# Patient Record
Sex: FEMALE | Race: BLACK OR AFRICAN AMERICAN | NOT HISPANIC OR LATINO | Employment: FULL TIME | URBAN - METROPOLITAN AREA
[De-identification: names, ages, dates, MRNs, and addresses within clinical notes are randomized per-mention and may not be internally consistent; named-entity substitution may affect disease eponyms.]

---

## 2023-04-21 LAB
CHLAMYDIA TRACH., AMPLIFIED: NEGATIVE
N. GONORRHEA, AMPLIFIED: NEGATIVE
TRICHOMONAS VAGINALIS: NEGATIVE

## 2023-10-14 PROBLEM — F31.9 BIPOLAR AND RELATED DISORDER (MULTI): Status: ACTIVE | Noted: 2023-10-14

## 2023-10-14 PROBLEM — J33.9 NASAL POLYP: Status: ACTIVE | Noted: 2023-10-14

## 2023-10-14 PROBLEM — S01.332A COMPLICATION OF LEFT EAR PIERCING: Status: ACTIVE | Noted: 2023-10-14

## 2023-10-14 PROBLEM — R53.81 MALAISE AND FATIGUE: Status: ACTIVE | Noted: 2023-10-14

## 2023-10-14 PROBLEM — M54.50 LUMBAR PAIN: Status: ACTIVE | Noted: 2023-10-14

## 2023-10-14 PROBLEM — M79.18 MYOFASCIAL PAIN SYNDROME OF LUMBAR SPINE: Status: ACTIVE | Noted: 2023-10-14

## 2023-10-14 PROBLEM — J45.909 ASTHMATIC BRONCHITIS (HHS-HCC): Status: ACTIVE | Noted: 2023-10-14
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
## 2023-10-14 PROBLEM — R53.83 FATIGUE: Status: ACTIVE | Noted: 2023-10-14

## 2023-10-14 PROBLEM — M79.18 CERVICAL MYOFASCIAL PAIN SYNDROME: Status: ACTIVE | Noted: 2023-10-14

## 2023-10-14 PROBLEM — E55.9 VITAMIN D DEFICIENCY: Status: ACTIVE | Noted: 2023-10-14

## 2023-10-14 PROBLEM — F41.9 ANXIETY: Status: ACTIVE | Noted: 2023-10-14

## 2023-10-14 PROBLEM — N92.6 MISSED MENSES: Status: ACTIVE | Noted: 2023-10-14

## 2023-10-14 PROBLEM — R45.89 DEPRESSED AFFECT: Status: ACTIVE | Noted: 2023-10-14

## 2023-10-14 PROBLEM — M54.12 CERVICAL RADICULOPATHY, ACUTE: Status: ACTIVE | Noted: 2023-10-14

## 2023-10-14 PROBLEM — R06.00 DYSPNEA: Status: ACTIVE | Noted: 2023-10-14

## 2023-10-14 PROBLEM — S13.4XXA WHIPLASH INJURY TO NECK: Status: ACTIVE | Noted: 2023-10-14

## 2023-10-14 PROBLEM — R53.83 MALAISE AND FATIGUE: Status: ACTIVE | Noted: 2023-10-14

## 2023-10-14 PROBLEM — R45.89 DEPRESSED MOOD: Status: ACTIVE | Noted: 2023-10-14

## 2023-10-14 PROBLEM — F41.8 DEPRESSION WITH ANXIETY: Status: ACTIVE | Noted: 2023-10-14

## 2023-10-14 PROBLEM — J32.0 CHRONIC MAXILLARY SINUSITIS: Status: ACTIVE | Noted: 2023-10-14

## 2023-10-14 PROBLEM — H52.13 MYOPIA, BILATERAL: Status: ACTIVE | Noted: 2023-10-14

## 2023-10-14 PROBLEM — J45.50: Status: ACTIVE | Noted: 2023-10-14

## 2023-10-14 RX ORDER — METHOCARBAMOL 750 MG/1
1 TABLET, FILM COATED ORAL 3 TIMES DAILY
COMMUNITY
Start: 2021-07-28 | End: 2023-12-06 | Stop reason: WASHOUT

## 2023-10-14 RX ORDER — GABAPENTIN 100 MG/1
CAPSULE ORAL AS NEEDED
COMMUNITY
End: 2023-12-06 | Stop reason: WASHOUT

## 2023-10-14 RX ORDER — MUPIROCIN 20 MG/G
OINTMENT TOPICAL 3 TIMES DAILY
COMMUNITY
Start: 2021-03-30 | End: 2023-12-06 | Stop reason: WASHOUT

## 2023-10-14 RX ORDER — LURASIDONE HYDROCHLORIDE 40 MG/1
1 TABLET, FILM COATED ORAL DAILY
COMMUNITY
Start: 2020-07-03 | End: 2023-10-20 | Stop reason: SDUPTHER

## 2023-10-14 RX ORDER — LAMOTRIGINE 100 MG/1
TABLET ORAL DAILY
COMMUNITY
End: 2023-12-06 | Stop reason: WASHOUT

## 2023-10-14 RX ORDER — ESCITALOPRAM OXALATE 20 MG/1
1 TABLET ORAL DAILY
COMMUNITY
End: 2023-12-06 | Stop reason: WASHOUT

## 2023-10-14 RX ORDER — SERTRALINE HYDROCHLORIDE 25 MG/1
1 TABLET, FILM COATED ORAL DAILY
COMMUNITY
Start: 2021-08-31 | End: 2023-10-20 | Stop reason: WASHOUT

## 2023-10-14 RX ORDER — BENZONATATE 200 MG/1
200 CAPSULE ORAL 3 TIMES DAILY PRN
COMMUNITY
Start: 2022-06-15 | End: 2023-12-06 | Stop reason: WASHOUT

## 2023-10-14 RX ORDER — CYCLOBENZAPRINE HCL 10 MG
1 TABLET ORAL NIGHTLY
COMMUNITY
Start: 2021-08-06 | End: 2023-12-06 | Stop reason: WASHOUT

## 2023-10-14 RX ORDER — PRENATAL VIT 49/IRON FUM/FOLIC 6.75-0.2MG
TABLET ORAL
COMMUNITY
End: 2024-05-01 | Stop reason: SDUPTHER

## 2023-10-14 RX ORDER — SERTRALINE HYDROCHLORIDE 100 MG/1
100 TABLET, FILM COATED ORAL
COMMUNITY
Start: 2022-05-12 | End: 2023-10-20 | Stop reason: WASHOUT

## 2023-10-14 RX ORDER — CLONAZEPAM 0.5 MG/1
0.5 TABLET ORAL DAILY PRN
COMMUNITY
Start: 2023-06-13 | End: 2023-12-06 | Stop reason: WASHOUT

## 2023-10-14 RX ORDER — HYDROXYZINE HYDROCHLORIDE 25 MG/1
25 TABLET, FILM COATED ORAL NIGHTLY
COMMUNITY
Start: 2021-08-31 | End: 2023-12-06 | Stop reason: WASHOUT

## 2023-10-14 RX ORDER — METFORMIN HYDROCHLORIDE 500 MG/1
1 TABLET ORAL EVERY 12 HOURS
COMMUNITY
Start: 2021-02-04 | End: 2023-12-06 | Stop reason: WASHOUT

## 2023-10-14 RX ORDER — NAPROXEN 500 MG/1
1 TABLET ORAL EVERY 12 HOURS PRN
COMMUNITY
Start: 2021-08-06 | End: 2023-10-19

## 2023-10-14 RX ORDER — NORGESTIMATE AND ETHINYL ESTRADIOL 0.25-0.035
1 KIT ORAL DAILY
COMMUNITY
Start: 2023-09-12 | End: 2023-12-06 | Stop reason: WASHOUT

## 2023-10-14 RX ORDER — LURASIDONE HYDROCHLORIDE 80 MG/1
TABLET, FILM COATED ORAL
COMMUNITY
Start: 2022-06-15 | End: 2023-12-06 | Stop reason: WASHOUT

## 2023-10-14 RX ORDER — LITHIUM CARBONATE 300 MG/1
600 TABLET, FILM COATED, EXTENDED RELEASE ORAL
COMMUNITY
Start: 2023-06-13 | End: 2023-12-06 | Stop reason: WASHOUT

## 2023-10-14 RX ORDER — CHOLECALCIFEROL (VITAMIN D3) 1250 MCG
TABLET ORAL
COMMUNITY
End: 2023-12-06 | Stop reason: WASHOUT

## 2023-10-14 RX ORDER — ALBUTEROL SULFATE 90 UG/1
1-2 AEROSOL, METERED RESPIRATORY (INHALATION) AS NEEDED
COMMUNITY
Start: 2022-06-15 | End: 2023-12-06 | Stop reason: WASHOUT

## 2023-10-17 ENCOUNTER — APPOINTMENT (OUTPATIENT)
Dept: OBSTETRICS AND GYNECOLOGY | Facility: CLINIC | Age: 33
End: 2023-10-17
Payer: MEDICAID

## 2023-10-19 ENCOUNTER — LAB (OUTPATIENT)
Dept: LAB | Facility: LAB | Age: 33
End: 2023-10-19
Payer: MEDICAID

## 2023-10-19 ENCOUNTER — INITIAL PRENATAL (OUTPATIENT)
Dept: OBSTETRICS AND GYNECOLOGY | Facility: CLINIC | Age: 33
End: 2023-10-19
Payer: MEDICAID

## 2023-10-19 VITALS — BODY MASS INDEX: 34.44 KG/M2 | SYSTOLIC BLOOD PRESSURE: 110 MMHG | DIASTOLIC BLOOD PRESSURE: 78 MMHG | WEIGHT: 194.4 LBS

## 2023-10-19 DIAGNOSIS — O99.210 OBESITY IN PREGNANCY (HHS-HCC): ICD-10-CM

## 2023-10-19 DIAGNOSIS — O09.91 HIGH-RISK PREGNANCY IN FIRST TRIMESTER (HHS-HCC): ICD-10-CM

## 2023-10-19 DIAGNOSIS — F31.9 BIPOLAR AND RELATED DISORDER (MULTI): ICD-10-CM

## 2023-10-19 DIAGNOSIS — O09.91 HIGH-RISK PREGNANCY IN FIRST TRIMESTER (HHS-HCC): Primary | ICD-10-CM

## 2023-10-19 PROBLEM — F31.10: Chronic | Status: ACTIVE | Noted: 2021-09-10

## 2023-10-19 PROBLEM — E66.9 OBESITY: Status: ACTIVE | Noted: 2023-03-03

## 2023-10-19 PROBLEM — F41.9 ANXIETY DISORDER, UNSPECIFIED: Status: ACTIVE | Noted: 2021-09-10

## 2023-10-19 LAB
ABO GROUP (TYPE) IN BLOOD: NORMAL
ALBUMIN SERPL BCP-MCNC: 4.5 G/DL (ref 3.4–5)
ALP SERPL-CCNC: 51 U/L (ref 33–110)
ALT SERPL W P-5'-P-CCNC: 14 U/L (ref 7–45)
ANION GAP SERPL CALC-SCNC: 13 MMOL/L (ref 10–20)
ANTIBODY SCREEN: NORMAL
AST SERPL W P-5'-P-CCNC: 14 U/L (ref 9–39)
BILIRUB SERPL-MCNC: 0.4 MG/DL (ref 0–1.2)
BUN SERPL-MCNC: 11 MG/DL (ref 6–23)
CALCIUM SERPL-MCNC: 9.9 MG/DL (ref 8.6–10.6)
CHLORIDE SERPL-SCNC: 103 MMOL/L (ref 98–107)
CO2 SERPL-SCNC: 24 MMOL/L (ref 21–32)
CREAT SERPL-MCNC: 0.68 MG/DL (ref 0.5–1.05)
ERYTHROCYTE [DISTWIDTH] IN BLOOD BY AUTOMATED COUNT: 12.6 % (ref 11.5–14.5)
EST. AVERAGE GLUCOSE BLD GHB EST-MCNC: 97 MG/DL
GFR SERPL CREATININE-BSD FRML MDRD: >90 ML/MIN/1.73M*2
GLUCOSE SERPL-MCNC: 89 MG/DL (ref 74–99)
HBA1C MFR BLD: 5 %
HBV SURFACE AG SERPL QL IA: NONREACTIVE
HCT VFR BLD AUTO: 35.4 % (ref 36–46)
HCV AB SER QL: NONREACTIVE
HGB BLD-MCNC: 11.9 G/DL (ref 12–16)
HIV 1+2 AB+HIV1 P24 AG SERPL QL IA: NONREACTIVE
MCH RBC QN AUTO: 29.9 PG (ref 26–34)
MCHC RBC AUTO-ENTMCNC: 33.6 G/DL (ref 32–36)
MCV RBC AUTO: 89 FL (ref 80–100)
NRBC BLD-RTO: 0 /100 WBCS (ref 0–0)
PLATELET # BLD AUTO: 366 X10*3/UL (ref 150–450)
PMV BLD AUTO: 9.3 FL (ref 7.5–11.5)
POTASSIUM SERPL-SCNC: 4.3 MMOL/L (ref 3.5–5.3)
PROT SERPL-MCNC: 7 G/DL (ref 6.4–8.2)
RBC # BLD AUTO: 3.98 X10*6/UL (ref 4–5.2)
RH FACTOR (ANTIGEN D): NORMAL
RUBV IGG SERPL IA-ACNC: 0.7 IA
RUBV IGG SERPL QL IA: NEGATIVE
SODIUM SERPL-SCNC: 136 MMOL/L (ref 136–145)
T PALLIDUM AB SER QL: NONREACTIVE
TSH SERPL-ACNC: 0.49 MIU/L (ref 0.44–3.98)
WBC # BLD AUTO: 8.9 X10*3/UL (ref 4.4–11.3)

## 2023-10-19 PROCEDURE — 99215 OFFICE O/P EST HI 40 MIN: CPT | Performed by: OBSTETRICS & GYNECOLOGY

## 2023-10-19 PROCEDURE — 86850 RBC ANTIBODY SCREEN: CPT

## 2023-10-19 PROCEDURE — 84443 ASSAY THYROID STIM HORMONE: CPT

## 2023-10-19 PROCEDURE — 86780 TREPONEMA PALLIDUM: CPT

## 2023-10-19 PROCEDURE — 80053 COMPREHEN METABOLIC PANEL: CPT

## 2023-10-19 PROCEDURE — 87086 URINE CULTURE/COLONY COUNT: CPT

## 2023-10-19 PROCEDURE — 85027 COMPLETE CBC AUTOMATED: CPT

## 2023-10-19 PROCEDURE — 83036 HEMOGLOBIN GLYCOSYLATED A1C: CPT

## 2023-10-19 PROCEDURE — 86803 HEPATITIS C AB TEST: CPT

## 2023-10-19 PROCEDURE — 87340 HEPATITIS B SURFACE AG IA: CPT

## 2023-10-19 PROCEDURE — 86900 BLOOD TYPING SEROLOGIC ABO: CPT

## 2023-10-19 PROCEDURE — 83020 HEMOGLOBIN ELECTROPHORESIS: CPT

## 2023-10-19 PROCEDURE — 83021 HEMOGLOBIN CHROMOTOGRAPHY: CPT

## 2023-10-19 PROCEDURE — 87389 HIV-1 AG W/HIV-1&-2 AB AG IA: CPT

## 2023-10-19 PROCEDURE — 86317 IMMUNOASSAY INFECTIOUS AGENT: CPT

## 2023-10-19 PROCEDURE — 87800 DETECT AGNT MULT DNA DIREC: CPT

## 2023-10-19 PROCEDURE — 86901 BLOOD TYPING SEROLOGIC RH(D): CPT

## 2023-10-19 PROCEDURE — 36415 COLL VENOUS BLD VENIPUNCTURE: CPT

## 2023-10-19 RX ORDER — LITHIUM CARBONATE 300 MG/1
CAPSULE ORAL
COMMUNITY
Start: 2023-08-11 | End: 2023-12-06 | Stop reason: WASHOUT

## 2023-10-19 RX ORDER — B-COMPLEX WITH VITAMIN C
1 TABLET ORAL DAILY
COMMUNITY
Start: 2023-10-06 | End: 2024-01-18 | Stop reason: HOSPADM

## 2023-10-19 RX ORDER — OLANZAPINE 10 MG/1
TABLET ORAL
COMMUNITY
Start: 2023-08-11 | End: 2023-10-20 | Stop reason: WASHOUT

## 2023-10-19 RX ORDER — HYDROXYZINE PAMOATE 50 MG/1
CAPSULE ORAL
COMMUNITY
Start: 2023-08-11 | End: 2023-12-06 | Stop reason: WASHOUT

## 2023-10-19 RX ORDER — METRONIDAZOLE 500 MG/1
500 TABLET ORAL EVERY 12 HOURS
COMMUNITY
Start: 2022-10-22 | End: 2022-10-29

## 2023-10-19 ASSESSMENT — EDINBURGH POSTNATAL DEPRESSION SCALE (EPDS)
I HAVE BEEN ANXIOUS OR WORRIED FOR NO GOOD REASON: HARDLY EVER
THE THOUGHT OF HARMING MYSELF HAS OCCURRED TO ME: NEVER
I HAVE FELT SCARED OR PANICKY FOR NO GOOD REASON: NO, NOT MUCH
I HAVE BEEN SO UNHAPPY THAT I HAVE BEEN CRYING: NO, NEVER
I HAVE BEEN ABLE TO LAUGH AND SEE THE FUNNY SIDE OF THINGS: AS MUCH AS I ALWAYS COULD
I HAVE BEEN SO UNHAPPY THAT I HAVE HAD DIFFICULTY SLEEPING: NOT AT ALL
I HAVE FELT SAD OR MISERABLE: NO, NOT AT ALL
I HAVE LOOKED FORWARD WITH ENJOYMENT TO THINGS: AS MUCH AS I EVER DID
THINGS HAVE BEEN GETTING ON TOP OF ME: NO, MOST OF THE TIME I HAVE COPED QUITE WELL
I HAVE BLAMED MYSELF UNNECESSARILY WHEN THINGS WENT WRONG: NOT VERY OFTEN
TOTAL SCORE: 4

## 2023-10-19 ASSESSMENT — SOCIAL DETERMINANTS OF HEALTH (SDOH)
WITHIN THE LAST YEAR, HAVE YOU BEEN AFRAID OF YOUR PARTNER OR EX-PARTNER?: NO
WITHIN THE LAST YEAR, HAVE YOU BEEN KICKED, HIT, SLAPPED, OR OTHERWISE PHYSICALLY HURT BY YOUR PARTNER OR EX-PARTNER?: NO
WITHIN THE LAST YEAR, HAVE YOU BEEN HUMILIATED OR EMOTIONALLY ABUSED IN OTHER WAYS BY YOUR PARTNER OR EX-PARTNER?: NO
WITHIN THE LAST YEAR, HAVE TO BEEN RAPED OR FORCED TO HAVE ANY KIND OF SEXUAL ACTIVITY BY YOUR PARTNER OR EX-PARTNER?: NO
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
## 2023-10-20 ENCOUNTER — TELEMEDICINE (OUTPATIENT)
Dept: BEHAVIORAL HEALTH | Facility: CLINIC | Age: 33
End: 2023-10-20
Payer: MEDICAID

## 2023-10-20 DIAGNOSIS — F31.9 BIPOLAR 1 DISORDER (MULTI): ICD-10-CM

## 2023-10-20 LAB
BACTERIA UR CULT: NORMAL
C TRACH RRNA SPEC QL NAA+PROBE: NEGATIVE
N GONORRHOEA DNA SPEC QL PROBE+SIG AMP: NEGATIVE

## 2023-10-20 PROCEDURE — 99213 OFFICE O/P EST LOW 20 MIN: CPT | Performed by: CLINICAL NURSE SPECIALIST

## 2023-10-20 RX ORDER — LURASIDONE HYDROCHLORIDE 40 MG/1
40 TABLET, FILM COATED ORAL DAILY
Qty: 90 TABLET | Refills: 1 | Status: SHIPPED | OUTPATIENT
Start: 2023-10-20 | End: 2024-10-19

## 2023-10-20 RX ORDER — SERTRALINE HYDROCHLORIDE 50 MG/1
50 TABLET, FILM COATED ORAL DAILY
Qty: 90 TABLET | Refills: 1 | Status: SHIPPED | OUTPATIENT
Start: 2023-10-20 | End: 2024-05-07 | Stop reason: SDUPTHER

## 2023-10-20 NOTE — PROGRESS NOTES
Subjective   Patient ID: Julisa Cruz is a 33 y.o. female who presents for transfer from [facility] . Diagnosed BPAD  Dr. Beatriz Casanova. Now 8wga and discontinued Clonazepam, and Lithium. Remained on Latuda 40mg anf Sertraline 50 mg po qam. Reports mood overall stable no SI/HI. Agrees to follow up in 2 weeks.      Assessment/Plan   IMP:  33 y.o. female who presents for transfer from [facility] . Diagnosed BPAD  Dr. Beatriz Casanova. Now 8wga and discontinued Clonazepam, and Lithium. R   Remained on Latuda 40mg anf Sertraline 50 mg po qam. Reports mood overall stable no SI/HI. Agrees to follow up in 2 weeks.      Diagnosis   BPAD   First Trimester pregnancy     Treatment   Continue  Latuda 40 mg po qam renewed   Continue Sertraline 50 mg po qam renewed   RTC 2 weeks

## 2023-10-21 LAB
HEMOGLOBIN A2: 2.7 % (ref 2–3.5)
HEMOGLOBIN A: 96.9 % (ref 95.8–98)
HEMOGLOBIN F: 0.4 % (ref 0–2)
HEMOGLOBIN IDENTIFICATION INTERPRETATION: NORMAL
PATH REVIEW-HGB IDENTIFICATION: NORMAL

## 2023-11-09 LAB — REFLEX ADDED, ANEMIA PANEL: NORMAL

## 2023-11-14 ENCOUNTER — ROUTINE PRENATAL (OUTPATIENT)
Dept: OBSTETRICS AND GYNECOLOGY | Facility: CLINIC | Age: 33
End: 2023-11-14
Payer: MEDICAID

## 2023-11-14 VITALS — WEIGHT: 185 LBS | DIASTOLIC BLOOD PRESSURE: 60 MMHG | BODY MASS INDEX: 32.77 KG/M2 | SYSTOLIC BLOOD PRESSURE: 110 MMHG

## 2023-11-14 DIAGNOSIS — O99.210 OBESITY IN PREGNANCY (HHS-HCC): ICD-10-CM

## 2023-11-14 DIAGNOSIS — O21.9 NAUSEA AND VOMITING IN PREGNANCY (HHS-HCC): ICD-10-CM

## 2023-11-14 DIAGNOSIS — O09.91 HIGH-RISK PREGNANCY IN FIRST TRIMESTER (HHS-HCC): Primary | ICD-10-CM

## 2023-11-14 DIAGNOSIS — F31.9 BIPOLAR AND RELATED DISORDER (MULTI): ICD-10-CM

## 2023-11-14 PROCEDURE — 99214 OFFICE O/P EST MOD 30 MIN: CPT | Performed by: OBSTETRICS & GYNECOLOGY

## 2023-11-14 NOTE — PROGRESS NOTES
The patient reports feeling a little bit better with decreased fatigue.  She continues to experience some nausea and vomiting.  She has her first check ultrasound scheduled in 2 days.  States that her mood has been stable.    FHT+ today     A/P: HROB     -  RTC Q 4 weeks     -  Declines additional Genetic testing other than the 1st Check     -  PNV refill     -  Zofran rx, not ordered 2/2 possible interaction with psych meds     -

## 2023-11-15 ENCOUNTER — TELEPHONE (OUTPATIENT)
Dept: OBSTETRICS AND GYNECOLOGY | Facility: CLINIC | Age: 33
End: 2023-11-15
Payer: MEDICAID

## 2023-11-16 ENCOUNTER — APPOINTMENT (OUTPATIENT)
Dept: RADIOLOGY | Facility: HOSPITAL | Age: 33
End: 2023-11-16
Payer: MEDICAID

## 2023-11-30 ENCOUNTER — ANCILLARY PROCEDURE (OUTPATIENT)
Dept: RADIOLOGY | Facility: CLINIC | Age: 33
End: 2023-11-30
Payer: MEDICAID

## 2023-11-30 DIAGNOSIS — Z34.90 PREGNANT (HHS-HCC): ICD-10-CM

## 2023-11-30 PROCEDURE — 76815 OB US LIMITED FETUS(S): CPT | Performed by: OBSTETRICS & GYNECOLOGY

## 2023-11-30 PROCEDURE — 76815 OB US LIMITED FETUS(S): CPT

## 2023-12-06 ENCOUNTER — OFFICE VISIT (OUTPATIENT)
Dept: BEHAVIORAL HEALTH | Facility: CLINIC | Age: 33
End: 2023-12-06
Payer: MEDICAID

## 2023-12-06 DIAGNOSIS — F31.9 BIPOLAR AND RELATED DISORDER (MULTI): ICD-10-CM

## 2023-12-06 PROCEDURE — 99215 OFFICE O/P EST HI 40 MIN: CPT | Performed by: CLINICAL NURSE SPECIALIST

## 2023-12-06 PROCEDURE — 1036F TOBACCO NON-USER: CPT | Performed by: CLINICAL NURSE SPECIALIST

## 2023-12-06 NOTE — PROGRESS NOTES
Subjective   Patient ID: Julisa Parnell is a 33 y.o. female [id number] presents for BPAD 1 . PMH includes psychiatric admissions  [facility] , 2023 [place],     Reports in 2023 experienced manic episode, depressed, no sleep for days, using ETOH, Cannabis , ie pressured, tangential speech. Taking Lithium at time. Prior to this was taking Latuda 80 mg, Sertraline 50 mg every other day. Since discharge remained on Sertraline 50 mg every other day and Latuda 40 mg , no ETOH and Cannabis.     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 Diagnosed with BPAD since age 25 yo managing well and treated by  Dr. Beatriz Casanova x 5 yrs and ind therapy Yvette for one yr up at [facility].      Presents now at 15 wga for unplanned pregnancy w/ EDC 24.  FOB is her BF of 1 yr,   No pregnancy complication, routine ultrasound are WNL. Sleeping good for  12 hours, Mood managed by taking Latuda 40 mg, Sertraline 50 every other day. Mood most the time slowly improving, endorses feeling anxious, depressed, tearful when discussing FOB. Denies hypomania, lability , SI/HI. Having relationship conflicts, moved out and in with her mother, who is supportive,  unsure if she wants to be with FOB.        HPI  Review of Systems   All other systems reviewed and are negative.  Objective   Physical Exam  Psychiatric:         Attention and Perception: Attention normal.         Mood and Affect: Mood is anxious and depressed. Affect is tearful.         Speech: Speech normal.         Behavior: Behavior is agitated.         Thought Content: Thought content normal.         Judgment: Judgment normal.   PMH   Diagnoses    Medication PNVI Latuda , Sertraline    Allergies Seasonal     PPH  Admission   -  [facility] for manic episode for BPAD     -  [facility]voluntary admission , [place], psychosis   Out Patient  Treatment   - [facility]  to  for diagnoses of BPAD   -   Dept of Psych ,   SI Hx: Denies SI, SIB,   Medical  Trials  Geodon ( sedated) , LMTG (some sedation) Lithium 600mg/day ( low dose)   Clonazepam ( low dose effective )    FPH  Maternal None   Paternal first cousins diagnosed BPAD, Schizophrenic,   Siblings None         Family Hx of completed suicide None       PSYCHOSOCIAL   [place]   Parents div age 15 yo and remarried  Younger 2 bro and younger step sister  Education Some college   Employed massage therapist for 15 yr.   Legal   one ye, plans to file for Bankruptcy       -      Assessment/Plan   IMP: 32 yo DF who presents for BPAD 1. PMH includes psychiatric admissions at  [facility] , 2023 [place]. Currently 15 wga for unplanned pregnancy w/ BF of on yr. Mood overall stable, no SI/HI, or francia. Taking Latuda 40 mg and Sertraline every other day. Experiencing relationship issues and unsure of future w FOB.  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   Reviewed r/b/a for use of Latuda, Sertraline in pregnancy, including PPHTN. Recommend referral to   IOP , and motivate to do so.  No longer using ETOH, Cannabis.         Diagnoses   BPAD 1   Second Trimester Unplanned Pregnancy  Single Status      Treatment   Cont Latuda 40 mg po qam ordered 90 1RF   Cont Sertraline 50 mg every other day ordered 90 1 RF   Refer to  PN IOP   RTC after completion

## 2023-12-14 ENCOUNTER — ROUTINE PRENATAL (OUTPATIENT)
Dept: OBSTETRICS AND GYNECOLOGY | Facility: CLINIC | Age: 33
End: 2023-12-14
Payer: MEDICAID

## 2023-12-14 ENCOUNTER — LAB (OUTPATIENT)
Dept: LAB | Facility: LAB | Age: 33
End: 2023-12-14
Payer: MEDICAID

## 2023-12-14 VITALS — BODY MASS INDEX: 34.19 KG/M2 | WEIGHT: 193 LBS | DIASTOLIC BLOOD PRESSURE: 80 MMHG | SYSTOLIC BLOOD PRESSURE: 112 MMHG

## 2023-12-14 DIAGNOSIS — Z34.01 ENCOUNTER FOR SUPERVISION OF NORMAL FIRST PREGNANCY, FIRST TRIMESTER (HHS-HCC): ICD-10-CM

## 2023-12-14 DIAGNOSIS — Z3A.16 16 WEEKS GESTATION OF PREGNANCY (HHS-HCC): Primary | ICD-10-CM

## 2023-12-14 PROCEDURE — 36415 COLL VENOUS BLD VENIPUNCTURE: CPT

## 2023-12-14 PROCEDURE — 99213 OFFICE O/P EST LOW 20 MIN: CPT

## 2023-12-14 NOTE — PROGRESS NOTES
Assessment/Plan   Diagnoses and all orders for this visit:  16 weeks gestation of pregnancy  Encounter for supervision of normal first pregnancy, first trimester  -     [facility] P[facility]el Prenatal Screen; Future    Lab results reviewed.  Anatomy US ordered  Start bASA for PEC prophylaxis  Reviewed warning signs and when to call provider  Follow up in 4 weeks for a routine prenatal visit.    HUGO Ayala-LIONEL Mark Janeth Parnell is a 33 y.o.  at 16w2d with a working estimated date of delivery of 2024, by Last Menstrual Period who presents for a routine prenatal visit. She denies vaginal bleeding, abdominal pain, leakage of fluid.   Pt endorses no questions or concerns.     Her pregnancy is complicated by:  Pregnancy Problems (from 10/19/23 to present)       No problems associated with this episode.             Objective   Physical Exam  Weight: 87.5 kg (193 lb), Pregravid BMI: 35.08  Expected Total Weight Gain: 5 kg (11 lb)-9 kg (19 lb)   BP: 112/80  Fetal Heart Rate: 150

## 2023-12-15 ENCOUNTER — SOCIAL WORK (OUTPATIENT)
Dept: BEHAVIORAL HEALTH | Facility: CLINIC | Age: 33
End: 2023-12-15
Payer: MEDICAID

## 2023-12-15 DIAGNOSIS — F31.9 BIPOLAR 1 DISORDER (MULTI): ICD-10-CM

## 2023-12-15 PROCEDURE — 90791 PSYCH DIAGNOSTIC EVALUATION: CPT

## 2023-12-15 NOTE — PROGRESS NOTES
Referred to therapy by: Margareth Alvarado       Reason for Referral: Reason for Referral: Bipolar Disorder: Diagnosed ten years ago   Finances, plans to file for bankruptcy, Relationships issues, pregnancy     PSYCHOSOCIAL HISTORIES  Living Environment: Patient moved into her parents one month ago after  from [facility]   Social support network: (family, friends, support group, other) Parents, family members   Cheondoism Spiritual orientation: None   Gender Identity and sexual orientation: Female, heterosexual   Recent losses or deaths: Denies       CHIEF COMPLAINT  CHIEF COMPLAINT SUMMARY:   Patient is a [age] y/o female with past mental health hx of Bipolar 1 d/o. Patient is currently sixteen weeks pregnant. Patient has gone to therapy in the past and has taken medication most of her adult life.  Patient states that the onset of her symptoms started in her early twenties. Patient states that her mental health has declined over the past year. Patient was admitted to [facility] in August of 2023. 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 Patient's   her because she left the [email or web address]. Patient started dating FOB shortly after being released from the hospital. Patient states the pregnancy was unplanned and she worries about her finances. Patient is planning to start a new job next week.     Patient has been living with her mother for the past month. Patient ended her relationship with [facility] due to his substance abuse issues and lack of employment. Patient identifies her primary support person as her mother. Patient would like to focus on self-care and staying in the present in therapy. Patient denies HI/SI and psychosis. Patient is not judged to be a danger to herself or others.     HISTORY OF PRESENT ILLNESS   Current Providers:    Psychiatrist:  Margareth Alvarado     What precipitated this most recent episode? Present stressors? Patient  identifies stressors as pregnancy, relationship with FOB and finances. Patient reports that her mental health declined after her divorce and having to close her business.     Prior mental health/substance abuse treatment:   Patient has taken medication for her mental health for most of her adult life. Patient had been seeing a therapist for the past year. Patient has been admitted to the hospital twice in the past  [facility] 2014, August 2023 [place],      Acute mental health symptoms:  Suicidal Ideation: Denies  Homicidal Ideation: Denies   Psychosis: Denies     Level of functioning impairment at work/home/school now High Moderate       Substance abuse hx:  Tobacco, vaping: Denies  Alcohol: Denies  Illicit drugs: Denies     Significant medical hx: None       Education Hx:   Highest level of education: Didn't address with patient   Hx of learning disability/IEP: N/A       Work Hx:  Are you currently working?  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     FAMILY HISTORY:  Paternal side of her family multiple first cousins have schizophrenia, depression and Bipolar d/o. Paternal first cousin has attempted suicide twice.     Hx of Abuse/Trauma History:   None:  Child abuse: (physical, sexual, emotional) Denies  Rape: Denies  Domestic Violence: Denies  Robbery: Denies   Near Fatal experience: Denies     Describe Trauma: Patient parents  when she was [age]. Patient's parents were both active- in her life growing up. Patient's father has stopped speaking with her because she doesn't follow [employer].     What barriers do you see interfering with your ability to attend therapy: Denies     Goals patient wants to work on while attending therapy 2-3: Improve on staying in the moment and focusing on self-care.     Biggest strengths and healthy coping  strategies: Patient tries to stay in the present and relies on her support system.     Mental Status Exam:  General appearance & grooming: Appropriate    Motor activity:  Appropriate       Behavior: Cooperative       Adaptive Equipment: None   Speech: Appropriate       Mood: Anxious       Affect: Mood Congruent Appropriate       Thought process:  Appropriate     Logical       Thought content: Appropriate     Cognition: No impairment, Orientation: Alert & Oriented x 3  Insight:  Aware of problem       Eye contact: Average       Judgment: Judgment intact       Interview behavior: Appropriate       Hallucinations: None       Delusions: Absent         PATIENT DISCUSSION/SUMMARY  PLAN:  Patient is a [age] y/o female currently seven teen weeks pregnant. Patient presenting with symptoms of anxiety and depression. Patient identifies her primary stressors as pregnancy, relationship with FOB and financial situation. Patient would like to work on self-care and focusing on the present while in therapy. Patient focusing on relationship with FOB for much of the assessment. Patient denies HI/SI and psychosis. Patient is not judged to be danger to herself or others.

## 2023-12-26 ENCOUNTER — TELEPHONE (OUTPATIENT)
Dept: OBSTETRICS AND GYNECOLOGY | Facility: CLINIC | Age: 33
End: 2023-12-26
Payer: MEDICAID

## 2023-12-27 NOTE — TELEPHONE ENCOUNTER
Called pt and verified name and , notfified pt that results for the test was still pending. Pt states she understands and will wait a little longer.

## 2024-01-02 ENCOUNTER — DOCUMENTATION (OUTPATIENT)
Dept: BEHAVIORAL HEALTH | Facility: CLINIC | Age: 34
End: 2024-01-02

## 2024-01-02 ENCOUNTER — ANCILLARY PROCEDURE (OUTPATIENT)
Dept: RADIOLOGY | Facility: CLINIC | Age: 34
End: 2024-01-02
Payer: MEDICAID

## 2024-01-02 DIAGNOSIS — Z34.90 PREGNANT (HHS-HCC): ICD-10-CM

## 2024-01-02 PROCEDURE — 76811 OB US DETAILED SNGL FETUS: CPT

## 2024-01-02 PROCEDURE — 76811 OB US DETAILED SNGL FETUS: CPT | Performed by: STUDENT IN AN ORGANIZED HEALTH CARE EDUCATION/TRAINING PROGRAM

## 2024-01-02 NOTE — PROGRESS NOTES
Patient not joining therapy session after ten minutes. Called patient's cell phone. Patient stated that she forgot to cancel her appointment for today and is currently at work. Rescheduled appointment for January 17th at 3pm

## 2024-01-09 ENCOUNTER — TELEPHONE (OUTPATIENT)
Dept: OBSTETRICS AND GYNECOLOGY | Facility: CLINIC | Age: 34
End: 2024-01-09
Payer: MEDICAID

## 2024-01-11 ENCOUNTER — ROUTINE PRENATAL (OUTPATIENT)
Dept: OBSTETRICS AND GYNECOLOGY | Facility: CLINIC | Age: 34
End: 2024-01-11
Payer: MEDICAID

## 2024-01-11 VITALS — DIASTOLIC BLOOD PRESSURE: 86 MMHG | WEIGHT: 195.4 LBS | SYSTOLIC BLOOD PRESSURE: 118 MMHG | BODY MASS INDEX: 34.61 KG/M2

## 2024-01-11 DIAGNOSIS — Z34.02 ENCOUNTER FOR SUPERVISION OF NORMAL FIRST PREGNANCY, SECOND TRIMESTER (HHS-HCC): ICD-10-CM

## 2024-01-11 DIAGNOSIS — Z3A.20 20 WEEKS GESTATION OF PREGNANCY (HHS-HCC): Primary | ICD-10-CM

## 2024-01-11 PROCEDURE — 99213 OFFICE O/P EST LOW 20 MIN: CPT

## 2024-01-11 NOTE — PROGRESS NOTES
Assessment/Plan   Diagnoses and all orders for this visit:  20 weeks gestation of pregnancy  Encounter for supervision of normal first pregnancy, second trimester      Labs reviewed.  Patient to repeat anatomy scan for better visualization, already has scheduled.   Reviewed s/sx of PTL, warning signs, fetal movement counts, and when to call provider  Follow up in 4 weeks for a routine prenatal visit.    HUGO Ayala-LIONEL Mark Janeth Parnell is a 33 y.o.  at 20w2d with a working estimated date of delivery of 2024, by Last Menstrual Period who presents for a routine prenatal visit. She denies vaginal bleeding, leakage of fluid, decreased fetal movements, or contractions.  Pt endorses no questions or concerns.     Her pregnancy is complicated by:  Pregnancy Problems (from 10/19/23 to present)       No problems associated with this episode.             Objective   Physical Exam  Weight: 88.6 kg (195 lb 6.4 oz)  Expected Total Weight Gain: 5 kg (11 lb)-9 kg (19 lb)   Pregravid BMI: 35.08  BP: 118/86  Fetal Heart Rate: 145 Fundal Height (cm):  (at umbilicus)   Griseofulvin Counseling:  I discussed with the patient the risks of griseofulvin including but not limited to photosensitivity, cytopenia, liver damage, nausea/vomiting and severe allergy.  The patient understands that this medication is best absorbed when taken with a fatty meal (e.g., ice cream or french fries).

## 2024-01-11 NOTE — LETTER
January 11, 2024     Patient: Julisa Parnell   YOB: 1990   Date of Visit: 1/11/2024       To Whom It May Concern:    It is my medical opinion that Julisa Parnell does not require any restrictions on her work until her delivery which is estimated at 5/28/24.     If you have any questions or concerns, please don't hesitate to call.         Sincerely,        HUGO Ayala-LIONEL    CC: No Recipients

## 2024-01-16 ENCOUNTER — APPOINTMENT (OUTPATIENT)
Dept: RADIOLOGY | Facility: CLINIC | Age: 34
End: 2024-01-16
Payer: MEDICAID

## 2024-01-17 ENCOUNTER — TELEMEDICINE (OUTPATIENT)
Dept: BEHAVIORAL HEALTH | Facility: CLINIC | Age: 34
End: 2024-01-17
Payer: MEDICAID

## 2024-01-17 ENCOUNTER — SOCIAL WORK (OUTPATIENT)
Dept: BEHAVIORAL HEALTH | Facility: CLINIC | Age: 34
End: 2024-01-17
Payer: MEDICAID

## 2024-01-17 DIAGNOSIS — F31.9 BIPOLAR AND RELATED DISORDER (MULTI): ICD-10-CM

## 2024-01-17 DIAGNOSIS — F31.9 BIPOLAR 1 DISORDER (MULTI): ICD-10-CM

## 2024-01-17 PROCEDURE — 99214 OFFICE O/P EST MOD 30 MIN: CPT | Performed by: CLINICAL NURSE SPECIALIST

## 2024-01-17 PROCEDURE — 90837 PSYTX W PT 60 MINUTES: CPT

## 2024-01-17 NOTE — PROGRESS NOTES
Subjective   Patient ID: Julisa Parnell is a 33 y.o. female who presents for BPAD1 . 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 PMH includes psychiatric admissions at [facility], August 2023 [place]. Currently 15 wga for unplanned pregnancy w/ BF of on yr.   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  Reviewed r/b/a for use of Latuda, Sertraline in pregnancy, including PPHTN. No longer using ETOH, Cannabis.   HPI  Review of Systems   All other systems reviewed and are negative.  Objective   Physical Exam  Psychiatric:         Attention and Perception: Attention normal.         Mood and Affect: Mood normal.         Speech: Speech normal.         Behavior: Behavior normal. Behavior is cooperative.         Thought Content: Thought content normal.         Cognition and Memory: Cognition and memory normal.         Judgment: Judgment normal.     INTERIM: Now at 21 wga for unplanned pregnancy . Baby is a girl, Elsa. Routine U/S, genetic, and anatomical screening are WNL. Feeling fetal movement and excited. Mood feeling 'good,' positive toward pregnancy, denies depression, sadness, crying, SI/HI or any hypomanic  sx.  Taking Latuda 40 mg Seeing Adriana Sidhu reading info on pregnancy, nesting ANXIETY Taking Sertraline 50 mg every day vs every other day . Was taking every other day due to difficulty w/ Sleep onset, Always had BZD and now not needing during pregnancy. Planning to stay with parents after delivery who are supportive, getting along w/ FOB, but no approve of his lifestyle. Sleep WNL for 10-12 hours per night . Hx francia, last August 2023 hospitalized x 7 days ie always starts w/  insomnia , went 3 days no sleep, taking CBD, wine,  uncontrollable crying, pressured speech, was not taking Latuda but on low dose Li  Now she is careful w/ Ads. In August treated w/ Latuda 80 mg and added SSRI later. Mother aware of when pt sx increase, and knows how to intervene. Pt  expressed distress of family Sabianist practices which has rejected her.       Assessment/Plan   IMP: 34 yo DF who presents for BPAD 1.  Mood overall stable, no SI/HI, or francia. Taking Latuda 40 mg and Sertraline 50 mg every day. Now 21 wga and pregnancy progressing well. Experiencing relationship issues and unsure of future w FOB.  Seeing Adriana Sidhu, addressing boundaries setting with FOB and family. Will titrate Latuda and tapering SSRI toward end of pregnancy as indicated.            Diagnoses   BPAD 1   Second Trimester Unplanned Pregnancy  Single Status       Treatment   Cont Latuda 40 mg po qam ordered 90 1RF   Cont Sertraline 50 mg every other day ordered 90 1 RF   Continue ind therapy    RTC one month   Monitor for hypomanic sx

## 2024-01-18 ENCOUNTER — HOSPITAL ENCOUNTER (OUTPATIENT)
Facility: HOSPITAL | Age: 34
End: 2024-01-18
Attending: OBSTETRICS & GYNECOLOGY | Admitting: OBSTETRICS & GYNECOLOGY
Payer: MEDICAID

## 2024-01-18 ENCOUNTER — TELEPHONE (OUTPATIENT)
Dept: OBSTETRICS AND GYNECOLOGY | Facility: HOSPITAL | Age: 34
End: 2024-01-18
Payer: MEDICAID

## 2024-01-18 ENCOUNTER — HOSPITAL ENCOUNTER (OUTPATIENT)
Facility: HOSPITAL | Age: 34
Discharge: HOME | End: 2024-01-18
Attending: OBSTETRICS & GYNECOLOGY | Admitting: OBSTETRICS & GYNECOLOGY
Payer: MEDICAID

## 2024-01-18 VITALS
HEART RATE: 96 BPM | RESPIRATION RATE: 18 BRPM | HEIGHT: 63 IN | BODY MASS INDEX: 34.61 KG/M2 | TEMPERATURE: 97.2 F | SYSTOLIC BLOOD PRESSURE: 121 MMHG | WEIGHT: 195.33 LBS | DIASTOLIC BLOOD PRESSURE: 72 MMHG | OXYGEN SATURATION: 99 %

## 2024-01-18 PROBLEM — E86.0: Status: ACTIVE | Noted: 2024-01-18

## 2024-01-18 PROBLEM — Z3A.21 21 WEEKS GESTATION OF PREGNANCY (HHS-HCC): Status: ACTIVE | Noted: 2024-01-18

## 2024-01-18 PROBLEM — O26.899: Status: ACTIVE | Noted: 2024-01-18

## 2024-01-18 LAB
ERYTHROCYTE [DISTWIDTH] IN BLOOD BY AUTOMATED COUNT: 13.7 % (ref 11.5–14.5)
FLUAV RNA RESP QL NAA+PROBE: NOT DETECTED
FLUBV RNA RESP QL NAA+PROBE: NOT DETECTED
HCT VFR BLD AUTO: 32.8 % (ref 36–46)
HGB BLD-MCNC: 11.3 G/DL (ref 12–16)
MCH RBC QN AUTO: 31.1 PG (ref 26–34)
MCHC RBC AUTO-ENTMCNC: 34.5 G/DL (ref 32–36)
MCV RBC AUTO: 90 FL (ref 80–100)
NRBC BLD-RTO: 0 /100 WBCS (ref 0–0)
PLATELET # BLD AUTO: 330 X10*3/UL (ref 150–450)
RBC # BLD AUTO: 3.63 X10*6/UL (ref 4–5.2)
SARS-COV-2 RNA RESP QL NAA+PROBE: NOT DETECTED
WBC # BLD AUTO: 15.4 X10*3/UL (ref 4.4–11.3)

## 2024-01-18 PROCEDURE — 99212 OFFICE O/P EST SF 10 MIN: CPT | Mod: 25

## 2024-01-18 PROCEDURE — 87636 SARSCOV2 & INF A&B AMP PRB: CPT | Performed by: OBSTETRICS & GYNECOLOGY

## 2024-01-18 PROCEDURE — 99214 OFFICE O/P EST MOD 30 MIN: CPT | Performed by: OBSTETRICS & GYNECOLOGY

## 2024-01-18 PROCEDURE — 85027 COMPLETE CBC AUTOMATED: CPT | Performed by: OBSTETRICS & GYNECOLOGY

## 2024-01-18 PROCEDURE — 36415 COLL VENOUS BLD VENIPUNCTURE: CPT | Mod: 59

## 2024-01-18 PROCEDURE — 36415 COLL VENOUS BLD VENIPUNCTURE: CPT | Performed by: OBSTETRICS & GYNECOLOGY

## 2024-01-18 RX ORDER — HYDRALAZINE HYDROCHLORIDE 20 MG/ML
5 INJECTION INTRAMUSCULAR; INTRAVENOUS ONCE AS NEEDED
Status: DISCONTINUED | OUTPATIENT
Start: 2024-01-18 | End: 2024-01-18 | Stop reason: HOSPADM

## 2024-01-18 RX ORDER — ASPIRIN 81 MG/1
81 TABLET ORAL DAILY
COMMUNITY
End: 2024-05-15 | Stop reason: HOSPADM

## 2024-01-18 RX ORDER — LABETALOL HYDROCHLORIDE 5 MG/ML
20 INJECTION, SOLUTION INTRAVENOUS ONCE AS NEEDED
Status: DISCONTINUED | OUTPATIENT
Start: 2024-01-18 | End: 2024-01-18 | Stop reason: HOSPADM

## 2024-01-18 RX ORDER — NIFEDIPINE 10 MG/1
10 CAPSULE ORAL ONCE AS NEEDED
Status: DISCONTINUED | OUTPATIENT
Start: 2024-01-18 | End: 2024-01-18 | Stop reason: HOSPADM

## 2024-01-18 SDOH — HEALTH STABILITY: MENTAL HEALTH: SUICIDAL BEHAVIOR (LIFETIME): NO

## 2024-01-18 SDOH — SOCIAL STABILITY: SOCIAL INSECURITY: ABUSE SCREEN: ADULT

## 2024-01-18 SDOH — HEALTH STABILITY: MENTAL HEALTH: HAVE YOU USED ANY SUBSTANCES (CANABIS, COCAINE, HEROIN, HALLUCINOGENS, INHALANTS, ETC.) IN THE PAST 12 MONTHS?: NO

## 2024-01-18 SDOH — SOCIAL STABILITY: SOCIAL INSECURITY: DOES ANYONE TRY TO KEEP YOU FROM HAVING/CONTACTING OTHER FRIENDS OR DOING THINGS OUTSIDE YOUR HOME?: NO

## 2024-01-18 SDOH — SOCIAL STABILITY: SOCIAL INSECURITY: HAS ANYONE EVER THREATENED TO HURT YOUR FAMILY OR YOUR PETS?: NO

## 2024-01-18 SDOH — HEALTH STABILITY: MENTAL HEALTH: NON-SPECIFIC ACTIVE SUICIDAL THOUGHTS (PAST 1 MONTH): NO

## 2024-01-18 SDOH — SOCIAL STABILITY: SOCIAL INSECURITY: ARE YOU OR HAVE YOU BEEN THREATENED OR ABUSED PHYSICALLY, EMOTIONALLY, OR SEXUALLY BY ANYONE?: NO

## 2024-01-18 SDOH — HEALTH STABILITY: MENTAL HEALTH: WISH TO BE DEAD (PAST 1 MONTH): NO

## 2024-01-18 SDOH — SOCIAL STABILITY: SOCIAL INSECURITY: PHYSICAL ABUSE: DENIES

## 2024-01-18 SDOH — SOCIAL STABILITY: SOCIAL INSECURITY: ARE THERE ANY APPARENT SIGNS OF INJURIES/BEHAVIORS THAT COULD BE RELATED TO ABUSE/NEGLECT?: NO

## 2024-01-18 SDOH — SOCIAL STABILITY: SOCIAL INSECURITY: VERBAL ABUSE: DENIES

## 2024-01-18 SDOH — ECONOMIC STABILITY: HOUSING INSECURITY: DO YOU FEEL UNSAFE GOING BACK TO THE PLACE WHERE YOU ARE LIVING?: NO

## 2024-01-18 SDOH — HEALTH STABILITY: MENTAL HEALTH: HAVE YOU USED ANY PRESCRIPTION DRUGS OTHER THAN PRESCRIBED IN THE PAST 12 MONTHS?: NO

## 2024-01-18 SDOH — SOCIAL STABILITY: SOCIAL INSECURITY: HAVE YOU HAD THOUGHTS OF HARMING ANYONE ELSE?: NO

## 2024-01-18 SDOH — HEALTH STABILITY: MENTAL HEALTH: WERE YOU ABLE TO COMPLETE ALL THE BEHAVIORAL HEALTH SCREENINGS?: YES

## 2024-01-18 SDOH — SOCIAL STABILITY: SOCIAL INSECURITY: DO YOU FEEL ANYONE HAS EXPLOITED OR TAKEN ADVANTAGE OF YOU FINANCIALLY OR OF YOUR PERSONAL PROPERTY?: NO

## 2024-01-18 ASSESSMENT — LIFESTYLE VARIABLES
AUDIT-C TOTAL SCORE: 0
HOW MANY STANDARD DRINKS CONTAINING ALCOHOL DO YOU HAVE ON A TYPICAL DAY: PATIENT DOES NOT DRINK
HOW OFTEN DO YOU HAVE A DRINK CONTAINING ALCOHOL: NEVER
AUDIT-C TOTAL SCORE: 0
SKIP TO QUESTIONS 9-10: 1
HOW OFTEN DO YOU HAVE 6 OR MORE DRINKS ON ONE OCCASION: NEVER

## 2024-01-18 ASSESSMENT — PATIENT HEALTH QUESTIONNAIRE - PHQ9
SUM OF ALL RESPONSES TO PHQ9 QUESTIONS 1 & 2: 0
1. LITTLE INTEREST OR PLEASURE IN DOING THINGS: NOT AT ALL
2. FEELING DOWN, DEPRESSED OR HOPELESS: NOT AT ALL

## 2024-01-18 ASSESSMENT — PAIN SCALES - GENERAL: PAINLEVEL: 0 - NO PAIN

## 2024-01-18 NOTE — TELEPHONE ENCOUNTER
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 Sat on her stool, was noted to be breathing heavy, and then fainted as witnessed by her client. Woke up quickly, but was sweaty and clammy, and dizzy. Drinking water made her feel sick. Also has a headache that seems to be getting worse, not better. This happened around 5 PM. Recommend evaluation in triage. Pt plans to go to [facility] as that is closer to her.    Jocelyne Gilliland MD

## 2024-01-19 NOTE — PROGRESS NOTES
Progress Note    Assessment/Plan   Julisa Parnell is a 33 y.o.      COVID and flu tests returned negative. CBC shows no anemia. She is feeling better and desires to go home. Plan to increase hydration. She will keep next scheduled prenatal visit. She will return with any concerning symptoms.     Active Problems:    Antepartum dehydration    21 weeks gestation of pregnancy    Pregnancy Problems (from 10/19/23 to present)       Problem Noted Resolved    Antepartum dehydration 2024 by Alina Jernigan MD No    Priority:  Medium      21 weeks gestation of pregnancy 2024 by Alina Jernigan MD No    Priority:  Medium            Allergies:   Patient has no known allergies.    Last Vitals:  Temp Pulse Resp BP MAP Pulse Ox   36.5 °C (97.7 °F) 92 18 116/68   98 %     Vitals Min/Max Last 24 Hours:  Temp  Min: 36.5 °C (97.7 °F)  Max: 36.5 °C (97.7 °F)  Pulse  Min: 92  Max: 92  Resp  Min: 18  Max: 18  BP  Min: 116/68  Max: 116/68      Physical Exam:  Physical Exam  Constitutional:       Appearance: Normal appearance.   Pulmonary:      Effort: Pulmonary effort is normal.   Abdominal:      Palpations: Abdomen is soft.   Neurological:      Mental Status: She is alert.   Psychiatric:         Mood and Affect: Mood normal.            Lab Data:  Lab Results   Component Value Date    WBC 15.4 (H) 2024    HGB 11.3 (L) 2024    HCT 32.8 (L) 2024     2024       Alina Jernigan MD

## 2024-01-19 NOTE — H&P
Obstetrical Admission History and Physical     Julisa Parnell is a 33 y.o. . She presents at 21.2 weeks after fainting at work.    Chief Complaint: Pt passed out at work    Assessment/Plan    33 year old female  1 presents at 21.2 weeks gestation with lightheadedness and not feeling well. Suspect dehydration. Will proceed with oral hydration. Will proceed with Covid test given not feeling well combined with loss of sense of smell and recent COVID exposure. CBC is also pending.     Active Problems:  There are no active Hospital Problems.      Pregnancy Problems (from 10/19/23 to present)       No problems associated with this episode.          Subjective   Julisa is here complaining of episode of lightheadedness followed by likely loss of consciousness. She was on her third massage client of the day and felt hot, flushed and lightheaded while standing. She was able to sit down, then fell with her upper body forward onto the massage table. Her client stated she lost consciousness briefly then felt nauseated. She admits to not eating or drinking well today due to loss of appetite, headache and overall not feeling well. Her mother had COVID about three weeks ago.Good fetal movement. Denies vaginal bleeding., Denies contractions.      Obstetrical History   OB History    Para Term  AB Living   1         0   SAB IAB Ectopic Multiple Live Births                  # Outcome Date GA Lbr Andrea/2nd Weight Sex Delivery Anes PTL Lv   1 Current                Past Medical History  Past Medical History:   Diagnosis Date    Asthma     Bipolar disorder, unspecified (CMS/[facility]) 06/15/2021    Bipolar and related disorder    Chronic frontal sinusitis 2019    Chronic frontal sinusitis    Encounter for fertility testing 2021    Fertility testing    Other specified joint disorders, unspecified knee 2017    Knee joint cyst, unspecified laterality    Other specified postprocedural states  07/27/2016    History of verrucae (wart) excision    Personal history of other diseases of the female genital tract 06/20/2014    History of dysmenorrhea    Personal history of other diseases of the female genital tract 06/20/2014    History of irregular menstrual cycles    Personal history of other infectious and parasitic diseases 07/27/2016    History of viral warts    Personal history of other infectious and parasitic diseases 07/27/2016    History of verruca vulgaris    Personal history of other specified conditions 09/20/2017    History of insomnia    Viral wart, unspecified 07/27/2016    Verruca warts (infectious)        Past Surgical History   Past Surgical History:   Procedure Laterality Date    OTHER SURGICAL HISTORY  04/18/2019    No history of surgery       Social History  Social History     Tobacco Use    Smoking status: Never    Smokeless tobacco: Never   Substance Use Topics    Alcohol use: Not Currently     Substance and Sexual Activity   Drug Use Not Currently       Allergies  Patient has no known allergies.     Medications  Medications Prior to Admission   Medication Sig Dispense Refill Last Dose    aspirin 81 mg EC tablet Take 1 tablet (81 mg) by mouth once daily.   1/17/2024    lurasidone (Latuda) 40 mg tablet Take 1 tablet (40 mg) by mouth once daily. 90 tablet 1 1/17/2024    prenatal vit 49-iron fum-folic (Mini Prenatal) 6.75 mg iron- 200 mcg tablet Take by mouth.   1/17/2024    Prenatal Vitamin 27 mg iron- 0.8 mg tablet Take 1 tablet by mouth once daily. as directed   1/17/2024    prenatal vitamin, iron-folic, (prenatal vit no.130-iron-folic) 27 mg iron-800 mcg folic acid tablet Take 1 tablet by mouth once daily. 90 tablet 3 1/17/2024    sertraline (Zoloft) 50 mg tablet Take 1 tablet (50 mg) by mouth once daily. 90 tablet 1 1/17/2024       Objective    Last Vitals  Temp Pulse Resp BP MAP O2 Sat   36.5 °C (97.7 °F) 92 18 116/68   98 %     Physical Examination  GENERAL: Examination reveals a  well developed, well nourished, gravid female in no acute distress. She is alert and cooperative.  LUNGS:  Normal respiratory effort  ABDOMEN: soft, gravid, nontender, nondistended, no abnormal masses, no epigastric pain  FHR is 156   PSYCHOLOGICAL: awake and alert; oriented to person, place, and time

## 2024-01-23 ENCOUNTER — HOSPITAL ENCOUNTER (OUTPATIENT)
Dept: RADIOLOGY | Facility: CLINIC | Age: 34
Discharge: HOME | End: 2024-01-23
Payer: MEDICAID

## 2024-01-23 DIAGNOSIS — O09.91 HIGH-RISK PREGNANCY IN FIRST TRIMESTER (HHS-HCC): ICD-10-CM

## 2024-01-23 DIAGNOSIS — O99.210 OBESITY IN PREGNANCY (HHS-HCC): ICD-10-CM

## 2024-01-23 PROCEDURE — 76816 OB US FOLLOW-UP PER FETUS: CPT | Performed by: OBSTETRICS & GYNECOLOGY

## 2024-01-23 PROCEDURE — 76816 OB US FOLLOW-UP PER FETUS: CPT

## 2024-02-06 ENCOUNTER — APPOINTMENT (OUTPATIENT)
Dept: BEHAVIORAL HEALTH | Facility: CLINIC | Age: 34
End: 2024-02-06
Payer: MEDICAID

## 2024-02-07 ENCOUNTER — APPOINTMENT (OUTPATIENT)
Dept: OBSTETRICS AND GYNECOLOGY | Facility: CLINIC | Age: 34
End: 2024-02-07
Payer: MEDICAID

## 2024-02-14 ENCOUNTER — TELEMEDICINE (OUTPATIENT)
Dept: BEHAVIORAL HEALTH | Facility: CLINIC | Age: 34
End: 2024-02-14
Payer: MEDICAID

## 2024-02-14 DIAGNOSIS — F31.9 BIPOLAR AND RELATED DISORDER (MULTI): ICD-10-CM

## 2024-02-14 PROCEDURE — 1036F TOBACCO NON-USER: CPT | Performed by: CLINICAL NURSE SPECIALIST

## 2024-02-14 PROCEDURE — 99214 OFFICE O/P EST MOD 30 MIN: CPT | Performed by: CLINICAL NURSE SPECIALIST

## 2024-02-14 NOTE — PROGRESS NOTES
"Subjective   Patient ID: Julisa Parnell is a 34 y.o. female yo DF who presents for BPAD 1.   Taking Latuda 40 mg and Sertraline 50 mg every day. Now pregnant Seeing Adriana Sidhu. Will titrate Latuda and tapering SSRI toward end of pregnancy as indicated.            INTERIM: Now at 25 wga for unplanned pregnancy. Mood reports ' Doing fabulous\", states she is calm, mellow, Mood is euthymic, denies any hypomanic sx  ie mood elevation, insomnia, irritable. Still working FT, states fainted, vomiting, headaches, and found to  be dehydrated. Tested for gT1DM  strong familial hx. Marcie not gained weight, baby weighing < 1lbs. Feeling movements, walking slower, hot flashes, discussed ice, water fan, intake water,    Last week birthday, some restlessness, trouble falling alseep.a lot on mind. Of note, slightly  pt stated increased Sertraline 50 mg from every other day to every day. Stated unsure why she did that.       HPI  Review of Systems   All other systems reviewed and are negative.  Objective   Physical Exam  Psychiatric:         Attention and Perception: Attention normal.         Mood and Affect: Mood normal.         Speech: Speech normal.         Behavior: Behavior normal. Behavior is cooperative.         Thought Content: Thought content normal.         Cognition and Memory: Cognition and memory normal.         Judgment: Judgment normal.     Assessment/Plan   IMP: 35 yo DF who presents for BPAD 1 in now 21 weeks pregnant.  Mood overall stable, no SI/HI, or francia . Managed on Latuda 40 mg and Sertraline. Pt inadvertently increased Sertraline from every other day to QD w. Pt reported some increased anxiety, restlessness, and observed mild mood elevation during appt. Was instructed to resume every other day dosing of Sertraline and agreeable. Seeing Adriana Sidhu, addressing relationship  w/ FOB .  Will titrate Latuda and titrate SSRI toward end of pregnancy as indicated.   Declines  need to increase " of Latuda at present.           Diagnoses   BPAD 1   Second Trimester Unplanned Pregnancy  Single Status       Treatment   Cont Latuda 40 mg po at bedtime   Resume Sertraline 50 mg every other day   Continue ind therapy    RTC one month   Monitor for hypomanic sx   Maintain  hydration optimize sleep

## 2024-02-28 ENCOUNTER — SOCIAL WORK (OUTPATIENT)
Dept: BEHAVIORAL HEALTH | Facility: CLINIC | Age: 34
End: 2024-02-28
Payer: MEDICAID

## 2024-02-28 DIAGNOSIS — F31.9 BIPOLAR 1 DISORDER (MULTI): ICD-10-CM

## 2024-02-28 PROCEDURE — 90837 PSYTX W PT 60 MINUTES: CPT
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
## 2024-03-06 ENCOUNTER — ROUTINE PRENATAL (OUTPATIENT)
Dept: OBSTETRICS AND GYNECOLOGY | Facility: CLINIC | Age: 34
End: 2024-03-06
Payer: MEDICAID

## 2024-03-06 ENCOUNTER — LAB (OUTPATIENT)
Dept: LAB | Facility: LAB | Age: 34
End: 2024-03-06
Payer: MEDICAID

## 2024-03-06 VITALS — BODY MASS INDEX: 36.56 KG/M2 | DIASTOLIC BLOOD PRESSURE: 68 MMHG | WEIGHT: 206.4 LBS | SYSTOLIC BLOOD PRESSURE: 118 MMHG

## 2024-03-06 DIAGNOSIS — Z3A.28 28 WEEKS GESTATION OF PREGNANCY (HHS-HCC): Primary | ICD-10-CM

## 2024-03-06 DIAGNOSIS — Z3A.28 28 WEEKS GESTATION OF PREGNANCY (HHS-HCC): ICD-10-CM

## 2024-03-06 DIAGNOSIS — E86.0: ICD-10-CM

## 2024-03-06 DIAGNOSIS — O26.899: ICD-10-CM

## 2024-03-06 DIAGNOSIS — O99.210 OBESITY IN PREGNANCY (HHS-HCC): ICD-10-CM

## 2024-03-06 LAB
ABO GROUP (TYPE) IN BLOOD: NORMAL
ANTIBODY SCREEN: NORMAL
ERYTHROCYTE [DISTWIDTH] IN BLOOD BY AUTOMATED COUNT: 12.8 % (ref 11.5–14.5)
FERRITIN SERPL-MCNC: 45 NG/ML
GLUCOSE 1H P 50 G GLC PO SERPL-MCNC: 94 MG/DL
HCT VFR BLD AUTO: 32.1 % (ref 36–46)
HGB BLD-MCNC: 10.4 G/DL (ref 12–16)
IRON SATN MFR SERPL: 13 %
IRON SERPL-MCNC: 58 UG/DL
MCH RBC QN AUTO: 29.6 PG (ref 26–34)
MCHC RBC AUTO-ENTMCNC: 32.4 G/DL (ref 32–36)
MCV RBC AUTO: 92 FL (ref 80–100)
NRBC BLD-RTO: 0 /100 WBCS (ref 0–0)
PLATELET # BLD AUTO: 301 X10*3/UL (ref 150–450)
RBC # BLD AUTO: 3.51 X10*6/UL (ref 4–5.2)
REFLEX ADDED, ANEMIA PANEL: NORMAL
RH FACTOR (ANTIGEN D): NORMAL
TIBC SERPL-MCNC: 445 UG/DL
TREPONEMA PALLIDUM IGG+IGM AB [PRESENCE] IN SERUM OR PLASMA BY IMMUNOASSAY: NONREACTIVE
UIBC SERPL-MCNC: 387 UG/DL
WBC # BLD AUTO: 12.1 X10*3/UL (ref 4.4–11.3)

## 2024-03-06 PROCEDURE — 83550 IRON BINDING TEST: CPT

## 2024-03-06 PROCEDURE — 99213 OFFICE O/P EST LOW 20 MIN: CPT

## 2024-03-06 PROCEDURE — 85027 COMPLETE CBC AUTOMATED: CPT

## 2024-03-06 PROCEDURE — 82607 VITAMIN B-12: CPT

## 2024-03-06 PROCEDURE — 82728 ASSAY OF FERRITIN: CPT

## 2024-03-06 PROCEDURE — 36415 COLL VENOUS BLD VENIPUNCTURE: CPT

## 2024-03-06 PROCEDURE — 86900 BLOOD TYPING SEROLOGIC ABO: CPT

## 2024-03-06 PROCEDURE — 82746 ASSAY OF FOLIC ACID SERUM: CPT

## 2024-03-06 PROCEDURE — 86850 RBC ANTIBODY SCREEN: CPT

## 2024-03-06 PROCEDURE — 82947 ASSAY GLUCOSE BLOOD QUANT: CPT

## 2024-03-06 PROCEDURE — 86901 BLOOD TYPING SEROLOGIC RH(D): CPT

## 2024-03-06 PROCEDURE — 86780 TREPONEMA PALLIDUM: CPT

## 2024-03-06 NOTE — LETTER
March 6, 2024     Patient: Julisa Parnell   YOB: 1990   Date of Visit: 3/6/2024         To Whom It May Concern,     I, Zee FERNANDEZ, have been seeing Julisa Parnell for her prenatal appointments and approve the use of prenatal massage for her pain control at this time. Please reach out to my office with any questions or concerns.            Sincerely,              JIM Ayala

## 2024-03-06 NOTE — PROGRESS NOTES
Assessment/Plan   Diagnoses and all orders for this visit:  28 weeks gestation of pregnancy  -     Type And Screen; Future  -     CBC Anemia Panel With Reflex,Pregnancy; Future  -     Glucose, 1 Hour Screen, Pregnancy; Future  -     Syphilis Screen with Reflex; Future  Antepartum dehydration  Obesity in pregnancy    Discussed places for delivery and pain relief options for labor and delivery. Patient is interested in laboring in a tub. Patient does plan eventual epidural.   Discussed diabetes screening and routine labs, to be completed today  Reviewed s/sx of PTL, warning signs, fetal movement counts, and when to call provider  Follow up in 4 weeks for a routine prenatal visit.    Zee Salomon, HUGO-LIOENL    Subjective     Julisa Mark Janeth Parnell is a 34 y.o.  at 28w1d with a working estimated date of delivery of 2024, by Last Menstrual Period who presents for a routine prenatal visit. She denies vaginal bleeding, leakage of fluid, decreased fetal movements, or contractions.  Pt endorses no questions or concerns.   Patient reports that she is feeling much better since her stop at the ED.   Her pregnancy is complicated by:  Pregnancy Problems (from 10/19/23 to present)       Problem Noted Resolved    Antepartum dehydration 2024 by Alina Jernigan MD No    21 weeks gestation of pregnancy 2024 by Alina Jernigan MD No             Objective   Physical Exam  Weight: 93.6 kg (206 lb 6.4 oz)  Expected Total Weight Gain: 5 kg (11 lb)-9 kg (19 lb)   Pregravid BMI: 35.08  BP: 118/68  Fetal Heart Rate: 140 Fundal Height (cm): 28 cm

## 2024-03-07 LAB
FOLATE SERPL-MCNC: >24 NG/ML
VIT B12 SERPL-MCNC: 478 PG/ML

## 2024-03-13 ENCOUNTER — TELEPHONE (OUTPATIENT)
Dept: BEHAVIORAL HEALTH | Facility: CLINIC | Age: 34
End: 2024-03-13

## 2024-03-13 ENCOUNTER — TELEMEDICINE (OUTPATIENT)
Dept: BEHAVIORAL HEALTH | Facility: CLINIC | Age: 34
End: 2024-03-13
Payer: MEDICAID

## 2024-03-13 DIAGNOSIS — F31.9 BIPOLAR 1 DISORDER (MULTI): ICD-10-CM

## 2024-03-13 PROCEDURE — 99214 OFFICE O/P EST MOD 30 MIN: CPT | Performed by: CLINICAL NURSE SPECIALIST

## 2024-03-13 PROCEDURE — 1036F TOBACCO NON-USER: CPT | Performed by: CLINICAL NURSE SPECIALIST

## 2024-03-13 RX ORDER — LURASIDONE HYDROCHLORIDE 60 MG/1
60 TABLET, FILM COATED ORAL DAILY
Qty: 30 TABLET | Refills: 2 | Status: SHIPPED | OUTPATIENT
Start: 2024-03-13 | End: 2024-05-10 | Stop reason: SDUPTHER

## 2024-03-13 RX ORDER — LURASIDONE HYDROCHLORIDE 60 MG/1
60 TABLET, FILM COATED ORAL DAILY
Qty: 30 TABLET | Refills: 2 | Status: SHIPPED | OUTPATIENT
Start: 2024-03-13 | End: 2024-03-13 | Stop reason: SDUPTHER

## 2024-03-13 ASSESSMENT — ENCOUNTER SYMPTOMS: FATIGUE: 1

## 2024-03-13 NOTE — PROGRESS NOTES
Subjective   Patient ID: Julisa Parnell is a 34 y.o. female who presents for No chief complaint on file..    INTERIM: Now at 29wga for unplanned pregnancy. Endorsing increasing discomforts of pregnancy, ie less sleep  has 5-6 hours for 2 days and then sleeps 10-12 hours next,  not manageable for her work schedule, describes rough nights past week ie .restless, stressed' at night. Also experiencing hot flashes, feeling overwhelmed and 'not ready ' for a baby.  Denies new medication. Previously taking Clonazepam, with Latuda. FOB involved  as well as family who her supportive. Mood is irritable, anxious, denies angry outbursts, mood elevation, lack of need for sleep, SI/HI. ,       HPI  Review of Systems   Constitutional:  Positive for fatigue.   Endocrine: Positive for heat intolerance.   All other systems reviewed and are negative.    Objective   Physical Exam  Psychiatric:         Attention and Perception: Attention normal.         Mood and Affect: Mood is anxious. Affect is tearful.         Speech: Speech normal.         Behavior: Behavior normal.         Thought Content: Thought content normal.         Cognition and Memory: Cognition and memory normal.         Judgment: Judgment normal.       Assessment/Plan     IMP:  IMP: 34 year old female who presents now with Bipolar 1 Disorder and now 29 WGA. Currently struggling with sleep and feeling overwhelmed, irritable and tired. Managed with 40 mg Latuda daily and 50 mg Sertraline every other day. Continuing therapy biweekly with Adriana. Tearful throughout the encounter as she describes feeling nervous, overwhelmed and tired. States mother is there to help her and very supportive. Still with the FOB, but not living together. Interested in starting  IOP. Discussed titrating Latuda up to 60 mg daily, and agreeable to this. Discussed returning to higher level of care if patient feels she is continuing to decline, and is agreeable, states she would go  to [facility], where se has had voluntary admission in past. Also discussed options ot decrease work schedule as needed to make things more manageable.     Diagnoses   BPAD 1   Third Trimester Unplanned Pregnancy   Single Status      Treatment:   Increase Latuda to 60 mg ordered   Continue Sertraline 50 mg every other day renewed   RTC 1 week  Contact provider via My Chart as needed   Refer to  PN IOP as pt is able to decrease work demands

## 2024-03-19 ENCOUNTER — HOSPITAL ENCOUNTER (OUTPATIENT)
Dept: RADIOLOGY | Facility: CLINIC | Age: 34
Discharge: HOME | End: 2024-03-19
Payer: MEDICAID

## 2024-03-19 DIAGNOSIS — Z34.90 PREGNANT (HHS-HCC): ICD-10-CM

## 2024-03-19 PROCEDURE — 76819 FETAL BIOPHYS PROFIL W/O NST: CPT | Performed by: OBSTETRICS & GYNECOLOGY

## 2024-03-19 PROCEDURE — 76816 OB US FOLLOW-UP PER FETUS: CPT | Performed by: OBSTETRICS & GYNECOLOGY

## 2024-03-19 PROCEDURE — 76816 OB US FOLLOW-UP PER FETUS: CPT

## 2024-03-20 ENCOUNTER — LAB (OUTPATIENT)
Dept: LAB | Facility: LAB | Age: 34
End: 2024-03-20
Payer: MEDICAID

## 2024-03-20 ENCOUNTER — TELEMEDICINE (OUTPATIENT)
Dept: BEHAVIORAL HEALTH | Facility: CLINIC | Age: 34
End: 2024-03-20
Payer: MEDICAID

## 2024-03-20 ENCOUNTER — SOCIAL WORK (OUTPATIENT)
Dept: BEHAVIORAL HEALTH | Facility: CLINIC | Age: 34
End: 2024-03-20
Payer: MEDICAID

## 2024-03-20 ENCOUNTER — ROUTINE PRENATAL (OUTPATIENT)
Dept: OBSTETRICS AND GYNECOLOGY | Facility: CLINIC | Age: 34
End: 2024-03-20
Payer: MEDICAID

## 2024-03-20 DIAGNOSIS — Z34.93 PRENATAL CARE IN THIRD TRIMESTER (HHS-HCC): Primary | ICD-10-CM

## 2024-03-20 DIAGNOSIS — Z3A.30 30 WEEKS GESTATION OF PREGNANCY (HHS-HCC): ICD-10-CM

## 2024-03-20 DIAGNOSIS — Z23 NEED FOR TDAP VACCINATION: ICD-10-CM

## 2024-03-20 DIAGNOSIS — F31.9 BIPOLAR 1 DISORDER (MULTI): ICD-10-CM

## 2024-03-20 DIAGNOSIS — O99.013 ANEMIA AFFECTING PREGNANCY IN THIRD TRIMESTER (HHS-HCC): ICD-10-CM

## 2024-03-20 DIAGNOSIS — Z53.1 REFUSAL OF BLOOD TRANSFUSIONS AS PATIENT IS JEHOVAH'S WITNESS: ICD-10-CM

## 2024-03-20 DIAGNOSIS — O09.91 HIGH-RISK PREGNANCY IN FIRST TRIMESTER (HHS-HCC): ICD-10-CM

## 2024-03-20 DIAGNOSIS — F31.9 BIPOLAR AND RELATED DISORDER (MULTI): ICD-10-CM

## 2024-03-20 PROBLEM — Z78.9 REFUSAL OF BLOOD PRODUCT: Status: ACTIVE | Noted: 2024-03-20

## 2024-03-20 LAB
ABO GROUP (TYPE) IN BLOOD: NORMAL
ANTIBODY SCREEN: NORMAL
CREAT UR-MCNC: 166.3 MG/DL (ref 20–320)
PROT UR-ACNC: 19 MG/DL (ref 5–24)
PROT/CREAT UR: 0.11 MG/MG CREAT (ref 0–0.17)
RH FACTOR (ANTIGEN D): NORMAL

## 2024-03-20 PROCEDURE — 84156 ASSAY OF PROTEIN URINE: CPT

## 2024-03-20 PROCEDURE — 90471 IMMUNIZATION ADMIN: CPT

## 2024-03-20 PROCEDURE — 36415 COLL VENOUS BLD VENIPUNCTURE: CPT

## 2024-03-20 PROCEDURE — 99213 OFFICE O/P EST LOW 20 MIN: CPT | Performed by: CLINICAL NURSE SPECIALIST

## 2024-03-20 PROCEDURE — 86901 BLOOD TYPING SEROLOGIC RH(D): CPT

## 2024-03-20 PROCEDURE — 90837 PSYTX W PT 60 MINUTES: CPT

## 2024-03-20 PROCEDURE — 90715 TDAP VACCINE 7 YRS/> IM: CPT

## 2024-03-20 PROCEDURE — 86850 RBC ANTIBODY SCREEN: CPT

## 2024-03-20 PROCEDURE — 86900 BLOOD TYPING SEROLOGIC ABO: CPT

## 2024-03-20 PROCEDURE — 99213 OFFICE O/P EST LOW 20 MIN: CPT

## 2024-03-20 PROCEDURE — 1036F TOBACCO NON-USER: CPT | Performed by: CLINICAL NURSE SPECIALIST

## 2024-03-20 PROCEDURE — 82570 ASSAY OF URINE CREATININE: CPT

## 2024-03-20 NOTE — PROGRESS NOTES
Subjective   Patient ID: Julisa Parnell is a 34 y.o. female who presents for management of BPAD.     INTERIM: Now at 30 wga. Since her last appt her Latuda was increased from 40 to 60 mg po at bedtime and began FeSo4 for iron deficiency. Reports feeling overall markedly improved since last appt. Now sleeping 8-10 hours per night, mood is euthymic, denies irritability, hypomaniac, lability. Less anxious , still some restlessness when has down time as feeling not 'doing enough'.  Has good support of family. Some concern of father, who did not approve of the pregnancy, up coming visit, Optimistic toward future.      HPI  Objective   Physical Exam  Psychiatric:         Attention and Perception: Attention and perception normal.         Mood and Affect: Affect normal. Mood is anxious.         Speech: Speech normal.         Behavior: Behavior normal. Behavior is cooperative.         Thought Content: Thought content normal.         Cognition and Memory: Cognition and memory normal.         Judgment: Judgment normal.     Assessment/Plan   IMP:  IMP: 34 year old female who presents now with Bipolar 1 Disorder and now 30 WGA. Was struggling with sleep and mood lability but since resolved sicne increasing Latuda, reports feeling 'more clear'.  Taking 50 mg Sertraline every other day. Continuing therapy biweekly with Adriana. Also agrees to begin to reduce work hours, trying to relax, and attending therapy.  Still with the FOB, but not living together. Interested in starting  IOP. Discussed titrating Latuda up to 80 as needed      Diagnoses   BPAD 1   Third Trimester Unplanned Pregnancy   Single Status       Treatment      Cont Latuda to 60 mg sufficient supply      Continue Sertraline 50 mg every other day renewed     RTC 1 week    Contact provider via My Chart as needed     Refer to  PN IOP as pt is able to decrease work demands

## 2024-03-20 NOTE — PROGRESS NOTES
Assessment/Plan   Diagnoses and all orders for this visit:  Prenatal care in third trimester  30 weeks gestation of pregnancy  Need for Tdap vaccination  -     Tdap vaccine, age 7 years and older  (BOOSTRIX)  Anemia of mother in pregnancy, delivered with postpartum condition  Anemia affecting pregnancy in third trimester  -     CBC; Future  Refusal of blood transfusions as patient is [employer]    Patient to have an appoitnemnt in office with MD before delivery for refusal of blood products.   Had follow up growth yesterday 3/19/24. Normal growth, bpp . [Plan to repeat at 36 weeks.   Reviewed s/sx of PTL, warning signs, fetal movement counts, and when to call provider  Follow up in 2 week for a routine prenatal visit.    HUGO Ayala-LIONEL    Subjective     Julisa Mark Janeth Parnell is a 34 y.o.  at 30w1d with a working estimated date of delivery of 2024, by Last Menstrual Period who presents for a routine prenatal visit. She denies vaginal bleeding, leakage of fluid, decreased fetal movements, or contractions.  Pt endorses no questions or concerns.     Her pregnancy is complicated by:  Pregnancy Problems (from 10/19/23 to present)       Problem Noted Resolved    Obesity in pregnancy 3/3/2023 by Lauren Landa, SAE No    Priority:  Medium      Antepartum dehydration 2024 by Alina Jernigan MD No    21 weeks gestation of pregnancy 2024 by Alina Jernigan MD No       Refusal of blood transfusions as patient is Amish          Objective   Physical Exam:   Expected Total Weight Gain: 5 kg (11 lb)-9 kg (19 lb)   Pregravid BMI: 35.08    Fetal Heart Rate: 150 Fundal Height (cm): 30 cm Presentation: Vertex

## 2024-03-21 NOTE — PROGRESS NOTES
Collaborative Care ([facility])  Progress Note    Type of Interaction: Virtual    Start Time: 3:00pm    End Time: 4:00pm    Patient states that things have been going well. Patient has been continuing to focus on herself and pregnancy. Patient's father sent gifts for the baby to her mother's house. Her father has still been calling her mother for updates. Patient is uncomfortable with her fathers behavior. Discussed boundaries that she would to have in place after baby is born. Patient reflecting on her marriage (now ) and her experience in the [employer]. Patient is aware that this provider will be transitioning to another role at the hospital. Patient will continue care with another therapist at hospital.     Appointment: Not Scheduled      Interventions Provided: Solution Focused Therapy      Progress Made: Moderate    Response to Intervention: Receptive         Plan:   Patient to continue care with another  provider. Patient's prescriber told her that she can call to check in if needed.           Follow Up / Next Appointment:  Patient plans on continuing counseling with another provider

## 2024-04-03 ENCOUNTER — SOCIAL WORK (OUTPATIENT)
Dept: BEHAVIORAL HEALTH | Facility: CLINIC | Age: 34
End: 2024-04-03
Payer: MEDICAID

## 2024-04-03 ENCOUNTER — APPOINTMENT (OUTPATIENT)
Dept: OBSTETRICS AND GYNECOLOGY | Facility: CLINIC | Age: 34
End: 2024-04-03
Payer: MEDICAID

## 2024-04-03 DIAGNOSIS — F31.9 BIPOLAR AND RELATED DISORDER (MULTI): ICD-10-CM

## 2024-04-03 PROCEDURE — 90791 PSYCH DIAGNOSTIC EVALUATION: CPT

## 2024-04-04 ENCOUNTER — ROUTINE PRENATAL (OUTPATIENT)
Dept: OBSTETRICS AND GYNECOLOGY | Facility: CLINIC | Age: 34
End: 2024-04-04
Payer: MEDICAID

## 2024-04-04 VITALS — DIASTOLIC BLOOD PRESSURE: 70 MMHG | BODY MASS INDEX: 37.38 KG/M2 | WEIGHT: 211 LBS | SYSTOLIC BLOOD PRESSURE: 122 MMHG

## 2024-04-04 DIAGNOSIS — Z53.1 REFUSAL OF BLOOD TRANSFUSIONS AS PATIENT IS JEHOVAH'S WITNESS: ICD-10-CM

## 2024-04-04 DIAGNOSIS — O99.343 BIPOLAR DISEASE DURING PREGNANCY IN THIRD TRIMESTER (MULTI): ICD-10-CM

## 2024-04-04 DIAGNOSIS — O99.210 OBESITY IN PREGNANCY (HHS-HCC): ICD-10-CM

## 2024-04-04 DIAGNOSIS — F31.9 BIPOLAR DISEASE DURING PREGNANCY IN THIRD TRIMESTER (MULTI): ICD-10-CM

## 2024-04-04 DIAGNOSIS — Z3A.21 21 WEEKS GESTATION OF PREGNANCY (HHS-HCC): ICD-10-CM

## 2024-04-04 DIAGNOSIS — G47.9 DIFFICULTY SLEEPING: ICD-10-CM

## 2024-04-04 DIAGNOSIS — Z34.93 PRENATAL CARE IN THIRD TRIMESTER (HHS-HCC): Primary | ICD-10-CM

## 2024-04-04 DIAGNOSIS — F41.8 DEPRESSION WITH ANXIETY: ICD-10-CM

## 2024-04-04 PROCEDURE — 99213 OFFICE O/P EST LOW 20 MIN: CPT

## 2024-04-04 NOTE — PROGRESS NOTES
Assessment/Plan   Diagnoses and all orders for this visit:  Prenatal care in third trimester  21 weeks gestation of pregnancy  Obesity in pregnancy  Difficulty sleeping  -     doxylamine (Unisom, doxylamine,) 25 mg tablet; Take 1 tablet (25 mg) by mouth as needed at bedtime for sleep or nausea.  Refusal of blood transfusions as patient is [employer]ism  Depression with anxiety  Bipolar disease during pregnancy in third trimester (CMS/HCC)    Patient has follow up ultrasound scheduled for 36 weeks. Start  testing weekly at 37 weeks in the s/o maternal obesity.   Patient to have next appointment with MD for discussion on refusal of blood products.   Discussed sleep and stress. Unisom prescribed for sleep aid.   Reviewed s/sx of PTL, warning signs, fetal movement counts, and when to call provider  Follow up in 2 week for a routine prenatal visit.    JIM Ayala     Julisa Parnell is a 34 y.o.  at 32w2d with a working estimated date of delivery of 2024, by Last Menstrual Period who presents for a routine prenatal visit. She denies vaginal bleeding, leakage of fluid, decreased fetal movements, or contractions.  Pt complains of:  Difficulty sleeping. Patient reports high anxiety at this time. She is working with counselor for psych management but reports that today has been rough. No concerns for SH, SI, or HI.     Her pregnancy is complicated by:  Pregnancy Problems (from 10/19/23 to present)       Problem Noted Resolved    Refusal of blood product 3/20/2024 by JIM Ayala No    Priority:  Medium      Antepartum dehydration 2024 by Alina Jernigan MD No    Priority:  Medium      21 weeks gestation of pregnancy 2024 by Alina Jernigan MD No    Priority:  Medium      Obesity in pregnancy 3/3/2023 by Lauren Landa, SAE No    Priority:  Medium         Bipolar disease during pregnancy in third trimester (CMS/[facility])          Objective    Physical Exam:   Weight: 95.7 kg (211 lb)  Expected Total Weight Gain: 5 kg (11 lb)-9 kg (19 lb)   Pregravid BMI: 35.08  BP: 122/70  Fetal Heart Rate: 150 Fundal Height (cm): 32 cm Presentation: Vertex

## 2024-04-09 NOTE — PROGRESS NOTES
Therapy Session - Initial Assessment    Session Type: Virtual    Session Time  Start: 1:00 pm  End: 1:55 pm     Reason for Visit / Chief Complaint- Bipolar Disorder    Accompanied by: Self  Guardian Status: Self  Caregiver Status: Does not have a caregiver    CHIEF COMPLAINT SUMMARY:   The patient is a 34-year-old female who presented with symptoms of depression, anxiety and diagnosed with bipolar disorder. The patient said she is 8 months pregnant, and although she always wanted a child, this was an unplanned pregnancy. The patient said she has been  for the past 2 years, and the father of the baby is her boyfriend. The patient said her main stressor is that she was raised in the [employer], and since getting , she has been banned from the [employer]. She said she has been staying with her mother, but due to her status in the Buddhism, her mother is getting pressure to kick her out or convince her to come back to the Buddhism. The patient said she feels bad for her parents, and she feels like the Buddhism is dividing her family. The patient said she was raised in the [employer] and used to be actively involved in ministry with her ex-. She feels a lot of grief about the friends and community she is no longer able to associate with. The patient said she struggles with finding her identity outside of the Buddhism, and she is trying to find her place in the world outside of the Buddhism. The patient denied any suicidal or homicidal ideation.     HISTORY OF PRESENT ILLNESS   Current Providers:    Psychiatrist: SAWYER Sanchez    Precipitants/Stressors: , banned from the [employer], loss of her community and friendships, stressors with her parents.    Prior mental health/substance abuse treatment: The patient said she has been hospitalized twice for mental health reasons and she did IOP once. She said she had a “nervous breakdown” last August and was  hospitalized.    Acute mental health symptoms:  Suicidal Ideation: Denied  Homicidal Ideation: Denied  Psychosis: Denied    Level of functioning impairment at work/home/school now: Mild    Substance abuse hx:  Tobacco, vaping: Denied  Alcohol: Denied  Illicit drugs: Denied    Significant medical hx: Denied      PSYCHOSOCIAL HISTORIES  Living Environment: Living with her mother and stepfather since her divorce.  Social support network: Mother, boyfriend  Mandaen/Spiritual orientation: Raised [employer], kicked out of the [employer] after her divorce. Her ex- didn't get kicked out  Gender Identity and sexual orientation: Female, heterosexual  Recent losses or deaths: Recent loss of friendships and community after getting kicked out of the [employer].    Education Hx: ,   Highest level of education: High School Graduate  Hx of learning disability/IEP: Denied    Work Hx:  Are you currently working?  Employed  Occupation:  Massage Therapist      Other Childhood Experiences: The patient said she was raised in the [employer]. She said that as a female, she was not encouraged to go to college, and she feels like she missed some opportunities. She said all of her friends were within this community.      FAMILY HISTORY:  Maternal Family Psych History:  Mother: Denied  Grandmother: Denied  Grandfather: Denied                                                                   Brother: Denied  Sister: Denied    Paternal Family Psych History:  Father: Denied  Grandmother: Denied  Grandfather: Denied                                                                        Brother: Denied  Sister: Denied    Hx of Abuse/Trauma History:   Child abuse: Denied  Rape: Denied  Domestic Violence: Denied  Robbery: Denied  Near Fatal experience: Denied    Goals patient wants to work on while attending therapy:  1.To deal with the grief of letting go of people she will no longer have in her life  2.To learn to accept her  situation and to manage her feelings of anger    Healthy coping strategies: Listens to music, does yoga, meditation, talks with her boyfriend.    What barriers do you see interfering with your ability to attend therapy: Denied    Mental Status Exam:  General appearance & grooming: Appropriate     Motor activity:  Appropriate             Behavior: Cooperative              Adaptive Equipment: Denied  Speech: Appropriate, Clear      Mood: Depressed/Flat, Anxious    Affect: Mood Congruent, Tearful  Thought process:  Racing, Indecisive   Thought content: Hopeless   Cognition: No impairment, Orientation: Alert & Oriented x 3  Insight:  Aware of problem    Eye contact: Average     Avoidant     Intense    Judgment: Judgment intact   Interview behavior: Appropriate    Hallucinations: None   Delusions: Absent        Impression/Diagnosis: Bipolar Disorder      Plan: The patient is agreeable with attending Solution-Focused Therapy for approximately 8-10 sessions. Will schedule a follow-up appointment in one week.      MAYLIN Yanes, CHARLIE-S    **This visit was performed using real-time telehealth tools, including an audio/video connection between the patient and this provider. The patient provided consent for the individual telemedicine service and understands the limitations of the visit.

## 2024-04-17 ENCOUNTER — ROUTINE PRENATAL (OUTPATIENT)
Dept: OBSTETRICS AND GYNECOLOGY | Facility: CLINIC | Age: 34
End: 2024-04-17
Payer: MEDICAID

## 2024-04-17 VITALS — WEIGHT: 210.4 LBS | BODY MASS INDEX: 37.27 KG/M2 | SYSTOLIC BLOOD PRESSURE: 120 MMHG | DIASTOLIC BLOOD PRESSURE: 66 MMHG

## 2024-04-17 DIAGNOSIS — Z78.9 REFUSAL OF BLOOD PRODUCT: ICD-10-CM

## 2024-04-17 DIAGNOSIS — F31.9 BIPOLAR DISEASE DURING PREGNANCY IN THIRD TRIMESTER (MULTI): ICD-10-CM

## 2024-04-17 DIAGNOSIS — O99.210 OBESITY IN PREGNANCY (HHS-HCC): ICD-10-CM

## 2024-04-17 DIAGNOSIS — O99.343 BIPOLAR DISEASE DURING PREGNANCY IN THIRD TRIMESTER (MULTI): ICD-10-CM

## 2024-04-17 DIAGNOSIS — Z3A.34 34 WEEKS GESTATION OF PREGNANCY (HHS-HCC): Primary | ICD-10-CM

## 2024-04-17 PROCEDURE — 99213 OFFICE O/P EST LOW 20 MIN: CPT

## 2024-04-17 NOTE — PROGRESS NOTES
Assessment/Plan   Diagnoses and all orders for this visit:  Bipolar disease during pregnancy in third trimester (Multi)  Obesity in pregnancy ([facility])  Refusal of blood product    Has follow up growth ultrasound scheduled for 24  Next appointment with Dr. Garcia, to review patient's refusal of blood product.   Discussed GBS, to be completed next visit.   Discussed weight gain in pregnancy, expectation 11-19lbs.   Reviewed s/sx of PTL, warning signs, fetal movement counts, and when to call provider  Follow up in 2 week for a routine prenatal visit.    JIM Ayala    Subjective     Julisa Deedee Parnell is a 34 y.o.  at 34w1d with a working estimated date of delivery of 2024, by Last Menstrual Period who presents for a routine prenatal visit. She denies vaginal bleeding, leakage of fluid, decreased fetal movements, or contractions.  Pt endorses concerns. Has questions about weight gain in pregnancy.     Her pregnancy is complicated by:  Pregnancy Problems (from 10/19/23 to present)       Problem Noted Resolved    Refusal of blood product 3/20/2024 by JIM Ayala No    Priority:  Medium      Antepartum dehydration ([facility]) 2024 by Alina Jernigan MD No    Priority:  Medium      21 weeks gestation of pregnancy ([facility]) 2024 by Alina Jernigan MD No    Priority:  Medium      Bipolar disease during pregnancy in third trimester (Multi) 10/14/2023 by Blessing Torres No    Priority:  Medium      Obesity in pregnancy ([facility]) 3/3/2023 by Lauren Landa, RN No    Priority:  Medium               Objective   Physical Exam:   Weight: 95.4 kg (210 lb 6.4 oz)  Expected Total Weight Gain: 5 kg (11 lb)-9 kg (19 lb)   Pregravid BMI: 35.08  BP: 120/66  Fetal Heart Rate: 140 Fundal Height (cm): 34 cm Presentation: Vertex

## 2024-04-24 ENCOUNTER — SOCIAL WORK (OUTPATIENT)
Dept: BEHAVIORAL HEALTH | Facility: CLINIC | Age: 34
End: 2024-04-24
Payer: MEDICAID

## 2024-04-24 DIAGNOSIS — F31.9 BIPOLAR AND RELATED DISORDER (MULTI): ICD-10-CM

## 2024-04-24 PROCEDURE — 90837 PSYTX W PT 60 MINUTES: CPT

## 2024-04-24 NOTE — PSYCHOTHERAPY
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    Relationship w/ mom, she gets pressure, ask her why she isn't attending  Its gotten routine  Have had honest conversations with her  Working through it    Her brother - he brought his partner, lives out of town  As a family - want to stay connected    Her dad is judging  Its a choice, family first  Last time she talked w/ Dad, last August  He's in [place], he was here while she was in the hospital    He said no, didn't want to talk to her  Has gotten better about it now - we all make choices  He's sending gifts - causes confusions  He's being inconsistent, it bothers her    The relationship is conditional - he's always been very conditional  Not by his standars    Loves her career  Reconnecting with other people now, good support system  No one from [employer] at her shower  Has high school friends, reconnecting with them    They embraced her  They wanted to celebrate her birthday  Gives her hope  Comfortable in this new space, more of her authentic self    Stress-free now, feels healthier  Same moral code,     Gets anxiety before, hard to visit  She's allowed to come, sits in the back, they can give her a greeting  Sends mixed messages    In a [facility] [employer], ws their community to latch onto  Misses doing that kind of work, she was too thinly spread  Had her own business, 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

## 2024-04-24 NOTE — PROGRESS NOTES
Therapy Session  Progress Note    Session Type: Virtual    Start Time: 2:00 pm  End Time: 2:55 pm    Appointment: Scheduled    Reason for Visit: Bipolar Disorder    Patient Symptoms/Interval History: Mood has improved, feels excited about the birth of her baby in one month. Had a baby shower and feels prepared. Feels like she is transitioning well to a new phase of her life. Has gained support from high school friends. Her mother and boyfriend are also very supportive. Feels like she has the freedom now to be her true self, rather than having to please other people.    Mental Status: Alert & Oriented x3    Functional Status: No impairment    Interventions Provided: CBT, Develop Coping Skills    Progress Made: Moderate    Response to Interventions: The patient was receptive to the interventions provided.    Action Plan/Homework: The patient will try to use grounding strategies to stay in the moment. Will talk more about the 5-4-3-2-1 exercise at the next session.    Treatment Plan: Use CBT to help the patient gain awareness of unhelpful thought patterns and core beliefs. Help the patient learn healthy coping skills.     Follow Up/Next Appointment: Follow up in     weeks.      MAYLIN Yanes, CHARLIE-S    **This visit was performed using real-time telehealth tools, including an audio/video connection between the patient and this provider. The patient provided consent for the individual telemedicine service and understands the limitations of the visit.

## 2024-04-29 ENCOUNTER — HOSPITAL ENCOUNTER (OUTPATIENT)
Facility: HOSPITAL | Age: 34
Discharge: HOME | End: 2024-04-29
Attending: OBSTETRICS & GYNECOLOGY | Admitting: OBSTETRICS & GYNECOLOGY
Payer: MEDICAID

## 2024-04-29 ENCOUNTER — TELEPHONE (OUTPATIENT)
Dept: OBSTETRICS AND GYNECOLOGY | Facility: CLINIC | Age: 34
End: 2024-04-29
Payer: MEDICAID

## 2024-04-29 VITALS
RESPIRATION RATE: 16 BRPM | OXYGEN SATURATION: 99 % | BODY MASS INDEX: 38.05 KG/M2 | TEMPERATURE: 97.3 F | DIASTOLIC BLOOD PRESSURE: 77 MMHG | HEART RATE: 84 BPM | HEIGHT: 63 IN | WEIGHT: 214.73 LBS | SYSTOLIC BLOOD PRESSURE: 120 MMHG

## 2024-04-29 LAB
ALBUMIN SERPL BCP-MCNC: 3.7 G/DL (ref 3.4–5)
ALP SERPL-CCNC: 109 U/L (ref 33–110)
ALT SERPL W P-5'-P-CCNC: 18 U/L (ref 7–45)
ANION GAP SERPL CALC-SCNC: 14 MMOL/L (ref 10–20)
AST SERPL W P-5'-P-CCNC: 18 U/L (ref 9–39)
BILIRUB SERPL-MCNC: 0.2 MG/DL (ref 0–1.2)
BUN SERPL-MCNC: 8 MG/DL (ref 6–23)
CALCIUM SERPL-MCNC: 9.3 MG/DL (ref 8.6–10.6)
CHLORIDE SERPL-SCNC: 104 MMOL/L (ref 98–107)
CO2 SERPL-SCNC: 21 MMOL/L (ref 21–32)
CREAT SERPL-MCNC: 0.64 MG/DL (ref 0.5–1.05)
EGFRCR SERPLBLD CKD-EPI 2021: >90 ML/MIN/1.73M*2
ERYTHROCYTE [DISTWIDTH] IN BLOOD BY AUTOMATED COUNT: 13.1 % (ref 11.5–14.5)
GLUCOSE SERPL-MCNC: 58 MG/DL (ref 74–99)
HCT VFR BLD AUTO: 34 % (ref 36–46)
HGB BLD-MCNC: 11.4 G/DL (ref 12–16)
MCH RBC QN AUTO: 29.7 PG (ref 26–34)
MCHC RBC AUTO-ENTMCNC: 33.5 G/DL (ref 32–36)
MCV RBC AUTO: 89 FL (ref 80–100)
NRBC BLD-RTO: 0 /100 WBCS (ref 0–0)
PLATELET # BLD AUTO: 284 X10*3/UL (ref 150–450)
POC APPEARANCE, URINE: CLEAR
POC BILIRUBIN, URINE: NEGATIVE
POC BLOOD, URINE: ABNORMAL
POC COLOR, URINE: YELLOW
POC GLUCOSE, URINE: NEGATIVE MG/DL
POC KETONES, URINE: NEGATIVE MG/DL
POC LEUKOCYTES, URINE: ABNORMAL
POC NITRITE,URINE: NEGATIVE
POC PH, URINE: 6.5 PH
POC PROTEIN, URINE: NEGATIVE MG/DL
POC SPECIFIC GRAVITY, URINE: <=1.005
POC UROBILINOGEN, URINE: 0.2 EU/DL
POTASSIUM SERPL-SCNC: 4.2 MMOL/L (ref 3.5–5.3)
PROT SERPL-MCNC: 6.6 G/DL (ref 6.4–8.2)
RBC # BLD AUTO: 3.84 X10*6/UL (ref 4–5.2)
SODIUM SERPL-SCNC: 135 MMOL/L (ref 136–145)
WBC # BLD AUTO: 9.8 X10*3/UL (ref 4.4–11.3)

## 2024-04-29 PROCEDURE — 99215 OFFICE O/P EST HI 40 MIN: CPT

## 2024-04-29 PROCEDURE — 36415 COLL VENOUS BLD VENIPUNCTURE: CPT | Performed by: NURSE PRACTITIONER

## 2024-04-29 PROCEDURE — 87086 URINE CULTURE/COLONY COUNT: CPT | Performed by: NURSE PRACTITIONER

## 2024-04-29 PROCEDURE — 99214 OFFICE O/P EST MOD 30 MIN: CPT | Performed by: NURSE PRACTITIONER

## 2024-04-29 PROCEDURE — 85027 COMPLETE CBC AUTOMATED: CPT | Performed by: NURSE PRACTITIONER

## 2024-04-29 PROCEDURE — 80053 COMPREHEN METABOLIC PANEL: CPT | Performed by: NURSE PRACTITIONER

## 2024-04-29 PROCEDURE — 2500000001 HC RX 250 WO HCPCS SELF ADMINISTERED DRUGS (ALT 637 FOR MEDICARE OP): Performed by: NURSE PRACTITIONER

## 2024-04-29 PROCEDURE — 36415 COLL VENOUS BLD VENIPUNCTURE: CPT

## 2024-04-29 RX ORDER — HYDRALAZINE HYDROCHLORIDE 20 MG/ML
5 INJECTION INTRAMUSCULAR; INTRAVENOUS ONCE AS NEEDED
Status: DISCONTINUED | OUTPATIENT
Start: 2024-04-29 | End: 2024-04-29 | Stop reason: HOSPADM

## 2024-04-29 RX ORDER — DIPHENHYDRAMINE HCL 25 MG
25 CAPSULE ORAL ONCE
Status: COMPLETED | OUTPATIENT
Start: 2024-04-29 | End: 2024-04-29

## 2024-04-29 RX ORDER — ACETAMINOPHEN 325 MG/1
975 TABLET ORAL ONCE
Status: COMPLETED | OUTPATIENT
Start: 2024-04-29 | End: 2024-04-29

## 2024-04-29 RX ORDER — METOCLOPRAMIDE 10 MG/1
10 TABLET ORAL ONCE
Status: COMPLETED | OUTPATIENT
Start: 2024-04-29 | End: 2024-04-29

## 2024-04-29 RX ORDER — LABETALOL HYDROCHLORIDE 5 MG/ML
20 INJECTION, SOLUTION INTRAVENOUS ONCE AS NEEDED
Status: DISCONTINUED | OUTPATIENT
Start: 2024-04-29 | End: 2024-04-29 | Stop reason: HOSPADM

## 2024-04-29 RX ORDER — NIFEDIPINE 10 MG/1
10 CAPSULE ORAL ONCE AS NEEDED
Status: DISCONTINUED | OUTPATIENT
Start: 2024-04-29 | End: 2024-04-29 | Stop reason: HOSPADM

## 2024-04-29 RX ADMIN — METOCLOPRAMIDE 10 MG: 10 TABLET ORAL at 14:08

## 2024-04-29 RX ADMIN — DIPHENHYDRAMINE HYDROCHLORIDE 25 MG: 25 CAPSULE ORAL at 14:08

## 2024-04-29 RX ADMIN — ACETAMINOPHEN 975 MG: 325 TABLET ORAL at 14:08

## 2024-04-29 SDOH — ECONOMIC STABILITY: HOUSING INSECURITY: DO YOU FEEL UNSAFE GOING BACK TO THE PLACE WHERE YOU ARE LIVING?: NO

## 2024-04-29 SDOH — SOCIAL STABILITY: SOCIAL INSECURITY: VERBAL ABUSE: DENIES

## 2024-04-29 SDOH — SOCIAL STABILITY: SOCIAL INSECURITY: ABUSE SCREEN: ADULT

## 2024-04-29 SDOH — HEALTH STABILITY: MENTAL HEALTH: NON-SPECIFIC ACTIVE SUICIDAL THOUGHTS (PAST 1 MONTH): NO

## 2024-04-29 SDOH — SOCIAL STABILITY: SOCIAL INSECURITY: DOES ANYONE TRY TO KEEP YOU FROM HAVING/CONTACTING OTHER FRIENDS OR DOING THINGS OUTSIDE YOUR HOME?: NO

## 2024-04-29 SDOH — HEALTH STABILITY: MENTAL HEALTH: WERE YOU ABLE TO COMPLETE ALL THE BEHAVIORAL HEALTH SCREENINGS?: YES

## 2024-04-29 SDOH — SOCIAL STABILITY: SOCIAL INSECURITY: DO YOU FEEL ANYONE HAS EXPLOITED OR TAKEN ADVANTAGE OF YOU FINANCIALLY OR OF YOUR PERSONAL PROPERTY?: NO

## 2024-04-29 SDOH — HEALTH STABILITY: MENTAL HEALTH: WISH TO BE DEAD (PAST 1 MONTH): NO

## 2024-04-29 SDOH — HEALTH STABILITY: MENTAL HEALTH: HAVE YOU USED ANY SUBSTANCES (CANABIS, COCAINE, HEROIN, HALLUCINOGENS, INHALANTS, ETC.) IN THE PAST 12 MONTHS?: NO

## 2024-04-29 SDOH — SOCIAL STABILITY: SOCIAL INSECURITY: PHYSICAL ABUSE: DENIES

## 2024-04-29 SDOH — SOCIAL STABILITY: SOCIAL INSECURITY: HAS ANYONE EVER THREATENED TO HURT YOUR FAMILY OR YOUR PETS?: NO

## 2024-04-29 SDOH — HEALTH STABILITY: MENTAL HEALTH: HAVE YOU USED ANY PRESCRIPTION DRUGS OTHER THAN PRESCRIBED IN THE PAST 12 MONTHS?: NO

## 2024-04-29 SDOH — HEALTH STABILITY: MENTAL HEALTH: SUICIDAL BEHAVIOR (LIFETIME): NO

## 2024-04-29 SDOH — SOCIAL STABILITY: SOCIAL INSECURITY: ARE YOU OR HAVE YOU BEEN THREATENED OR ABUSED PHYSICALLY, EMOTIONALLY, OR SEXUALLY BY ANYONE?: NO

## 2024-04-29 SDOH — SOCIAL STABILITY: SOCIAL INSECURITY: HAVE YOU HAD ANY THOUGHTS OF HARMING ANYONE ELSE?: NO

## 2024-04-29 SDOH — SOCIAL STABILITY: SOCIAL INSECURITY: ARE THERE ANY APPARENT SIGNS OF INJURIES/BEHAVIORS THAT COULD BE RELATED TO ABUSE/NEGLECT?: NO

## 2024-04-29 SDOH — SOCIAL STABILITY: SOCIAL INSECURITY: HAVE YOU HAD THOUGHTS OF HARMING ANYONE ELSE?: NO

## 2024-04-29 ASSESSMENT — LIFESTYLE VARIABLES
HOW OFTEN DO YOU HAVE A DRINK CONTAINING ALCOHOL: NEVER
HOW MANY STANDARD DRINKS CONTAINING ALCOHOL DO YOU HAVE ON A TYPICAL DAY: PATIENT DOES NOT DRINK
SKIP TO QUESTIONS 9-10: 1
HOW OFTEN DO YOU HAVE 6 OR MORE DRINKS ON ONE OCCASION: NEVER
AUDIT-C TOTAL SCORE: 0
AUDIT-C TOTAL SCORE: 0

## 2024-04-29 ASSESSMENT — PAIN SCALES - GENERAL
PAINLEVEL_OUTOF10: 5 - MODERATE PAIN
PAINLEVEL_OUTOF10: 0 - NO PAIN
PAINLEVEL_OUTOF10: 5 - MODERATE PAIN
PAINLEVEL_OUTOF10: 7
PAINLEVEL_OUTOF10: 2

## 2024-04-29 ASSESSMENT — PATIENT HEALTH QUESTIONNAIRE - PHQ9
2. FEELING DOWN, DEPRESSED OR HOPELESS: NOT AT ALL
SUM OF ALL RESPONSES TO PHQ9 QUESTIONS 1 & 2: 0
1. LITTLE INTEREST OR PLEASURE IN DOING THINGS: NOT AT ALL

## 2024-04-29 ASSESSMENT — PAIN - FUNCTIONAL ASSESSMENT: PAIN_FUNCTIONAL_ASSESSMENT: 0-10

## 2024-04-29 ASSESSMENT — PAIN DESCRIPTION - LOCATION: LOCATION: HEAD

## 2024-04-29 NOTE — TELEPHONE ENCOUNTER
Pt verified by name and .  Pt calling states she is 35 weeks pregnant.  Pt states she is feeling weak, dizzy, and has a headache.  Pt states baby is moving.  Pt denies vaginal bleeding, no blurred vision, no spots before here eyes.  Pt states she took her bp at work 132/89.  Pt was in car with her mom.  Pt advised to have mom drive her to L&D [facility] to be evaluated.    Nurse gave report to Merrill on L&D.

## 2024-04-29 NOTE — H&P
Obstetrical Triage History and Physical     Julisa Parnell is a 34 y.o.  at 35w6d. VIOLA: 2024, by Last Menstrual Period.  She has had prenatal care with CNM .    Chief Complaint: Headache    Assessment/Plan      Headache  - 7/10, frontal  - Bps cycled and have been grossly normotensive  - no other PEC s/s  - now 2/10 after tylenol, reglan, benadryl, PO fluids  - no c/f HDP at this time  - suspect HA r/t hypoglycemia    Dizziness/Fatigue  - orthostatics neg  - urine dip shows SG < 1.005, trace blood and leuks  - CBC wnl,  CMP notable for hypoglycemia, BS 58  - urine cx sent, will notify if abnormal  - given snack, felt better  - encouraged smaller, more frequent snacking  - continue PO iron     Contractions  - [place] Q 2-7 mins, abd soft to palpate, does not appreciate  - pt reports occasional back pain  - SVE closed/soft/high  - low c/f PTL at this time  - discussed return precautions    Maternal Well-being  - Vital signs stable and WNL  - Emotional support and reassurance provided  - All questions and concerns addressed    Fetal Well-being  - , non reactive NST  - BSUS per Dr. Chandler and NAIMA Cuenca: cephalic, BPP 8/8, FRAN 11  - good fetal movement per pt    Dispo: Home with return precautions. Keep appt for US  and with Dr. Ansari   Plan and tracing discussed with Dr. Alexander who reviewed tracing and agrees with d/c home    HUGO Connelly-CNP      Active Problems:  There are no active Hospital Problems.      Pregnancy Problems (from 10/19/23 to present)       Problem Noted Resolved    Refusal of blood product 3/20/2024 by HUGO Ayala-LIONEL No    Priority:  Medium      Bipolar disease during pregnancy in third trimester (Multi) 10/14/2023 by Blessing Torres No    Priority:  Medium      Obesity in pregnancy ([facility]) 3/3/2023 by Lauren Landa, RN No    Priority:  Medium      Bipolar disorder, most recent episode manic (Multi) 9/10/2021 by Lauren Landa, SAE No     Priority:  Medium      Anxiety disorder, unspecified 9/10/2021 by Lauren Landa, RN No    Priority:  Medium            Subjective   Julisa is here with headache, dizziness, and fatigue. She states that she took Unisom to sleep last night and usually does not have trouble functioning throughout the day. Today, she feels increased fatigue, was dizzy at work, feels like her brain is foggy, and reports 7/10 frontal HA. She denies seasonal allergies, N/V, fever. She reports feeling well yesterday, has been eating and drinking normally, and did eat breakfast this morning. She denies vaginal bleeding, loss of fluid, contractions, but does report intermittent back ache. She reports good FM.      Obstetrical History   OB History    Para Term  AB Living   1         0   SAB IAB Ectopic Multiple Live Births                  # Outcome Date GA Lbr Andrea/2nd Weight Sex Delivery Anes PTL Lv   1 Current                Past Medical History  Past Medical History:   Diagnosis Date    Asthma ([facility])     Bipolar disorder, unspecified (Multi) 06/15/2021    Bipolar and related disorder    Chronic frontal sinusitis 2019    Chronic frontal sinusitis    Encounter for fertility testing 2021    Fertility testing    Other specified joint disorders, unspecified knee 2017    Knee joint cyst, unspecified laterality    Other specified postprocedural states 2016    History of verrucae (wart) excision    Personal history of other diseases of the female genital tract 2014    History of dysmenorrhea    Personal history of other diseases of the female genital tract 2014    History of irregular menstrual cycles    Personal history of other infectious and parasitic diseases 2016    History of viral warts    Personal history of other infectious and parasitic diseases 2016    History of verruca vulgaris    Personal history of other specified conditions 2017    History of insomnia    Viral  wart, unspecified 07/27/2016    Verruca warts (infectious)        Past Surgical History   Past Surgical History:   Procedure Laterality Date    OTHER SURGICAL HISTORY  04/18/2019    No history of surgery       Social History  Social History     Tobacco Use    Smoking status: Never    Smokeless tobacco: Never   Substance Use Topics    Alcohol use: Not Currently     Substance and Sexual Activity   Drug Use Not Currently       Allergies  Patient has no known allergies.     Medications  Medications Prior to Admission   Medication Sig Dispense Refill Last Dose    aspirin 81 mg EC tablet Take 1 tablet (81 mg) by mouth once daily.   4/28/2024    doxylamine (Unisom, doxylamine,) 25 mg tablet Take 1 tablet (25 mg) by mouth as needed at bedtime for sleep or nausea. 30 tablet 0 4/28/2024    lurasidone (Latuda) 60 mg tablet Take 1 tablet (60 mg) by mouth once daily. 30 tablet 2 4/28/2024    prenatal vit 49-iron fum-folic (Mini Prenatal) 6.75 mg iron- 200 mcg tablet Take by mouth.   4/28/2024    sertraline (Zoloft) 50 mg tablet Take 1 tablet (50 mg) by mouth once daily. 90 tablet 1 4/28/2024    lurasidone (Latuda) 40 mg tablet Take 1 tablet (40 mg) by mouth once daily. 90 tablet 1 Unknown       Objective    Last Vitals  Temp Pulse Resp BP MAP O2 Sat   36.3 °C (97.3 °F) 90 16 115/73   99 % (Simultaneous filing. User may not have seen previous data.)     Physical Examination  Physical Exam  Constitutional:       Appearance: Normal appearance.   HENT:      Head: Normocephalic and atraumatic.      Mouth/Throat:      Mouth: Mucous membranes are moist.   Cardiovascular:      Rate and Rhythm: Normal rate and regular rhythm.      Heart sounds: No murmur heard.  Pulmonary:      Effort: Pulmonary effort is normal.      Breath sounds: Normal breath sounds.   Abdominal:      Palpations: Abdomen is soft.      Tenderness: There is no abdominal tenderness.   Genitourinary:     Comments: Cervical Exam  Dilation: Closed  Effacement (%):  20  Fetal Station: -3  Cervical Consistency: Soft  Cervical Position: Middle  Method: Manual  OB Examiner: Mikaela MCNAMARA  Fetal Assessment  Movement: Present  Mode: External US  Baseline Fetal Heart Rate (bpm): 150 bpm  Baseline Classification: Normal  Variability: Moderate (Between 6 and 25 BPM)  Pattern: Accelerations   Fetal Decelerations: No  Contraction Frequency: Q 2-7 mins, abd soft to palpate with contractions    Musculoskeletal:         General: Normal range of motion.      Cervical back: Normal range of motion.   Skin:     General: Skin is warm and dry.      Capillary Refill: Capillary refill takes less than 2 seconds.   Neurological:      Mental Status: She is alert and oriented to person, place, and time.   Psychiatric:         Behavior: Behavior normal.       Lab Review  Admission on 04/29/2024   Component Date Value Ref Range Status    POC Color, Urine 04/29/2024 Yellow  Straw, Yellow, Light-Yellow In process    POC Appearance, Urine 04/29/2024 Clear  Clear In process    POC Glucose, Urine 04/29/2024 NEGATIVE  NEGATIVE mg/dl In process    POC Bilirubin, Urine 04/29/2024 NEGATIVE  NEGATIVE In process    POC Ketones, Urine 04/29/2024 NEGATIVE  NEGATIVE mg/dl In process    POC Specific Gravity, Urine 04/29/2024 <=1.005  1.005 - 1.035 In process    POC Blood, Urine 04/29/2024 TRACE-Intact (A)  NEGATIVE In process    POC PH, Urine 04/29/2024 6.5  No Reference Range Established PH In process    POC Protein, Urine 04/29/2024 NEGATIVE  NEGATIVE, 30 (1+) mg/dl In process    POC Urobilinogen, Urine 04/29/2024 0.2  0.2, 1.0 EU/DL In process    Poc Nitrite, Urine 04/29/2024 NEGATIVE  NEGATIVE In process    POC Leukocytes, Urine 04/29/2024 TRACE (A)  NEGATIVE In process    WBC 04/29/2024 9.8  4.4 - 11.3 x10*3/uL Final    nRBC 04/29/2024 0.0  0.0 - 0.0 /100 WBCs Final    RBC 04/29/2024 3.84 (L)  4.00 - 5.20 x10*6/uL Final    Hemoglobin 04/29/2024 11.4 (L)  12.0 - 16.0 g/dL Final    Hematocrit 04/29/2024 34.0 (L)  36.0 -  46.0 % Final    MCV 04/29/2024 89  80 - 100 fL Final    MCH 04/29/2024 29.7  26.0 - 34.0 pg Final    MCHC 04/29/2024 33.5  32.0 - 36.0 g/dL Final    RDW 04/29/2024 13.1  11.5 - 14.5 % Final    Platelets 04/29/2024 284  150 - 450 x10*3/uL Final    Glucose 04/29/2024 58 (L)  74 - 99 mg/dL Final    Sodium 04/29/2024 135 (L)  136 - 145 mmol/L Final    Potassium 04/29/2024 4.2  3.5 - 5.3 mmol/L Final    Chloride 04/29/2024 104  98 - 107 mmol/L Final    Bicarbonate 04/29/2024 21  21 - 32 mmol/L Final    Anion Gap 04/29/2024 14  10 - 20 mmol/L Final    Urea Nitrogen 04/29/2024 8  6 - 23 mg/dL Final    Creatinine 04/29/2024 0.64  0.50 - 1.05 mg/dL Final    eGFR 04/29/2024 >90  >60 mL/min/1.73m*2 Final    Calculations of estimated GFR are performed using the 2021 CKD-EPI Study Refit equation without the race variable for the IDMS-Traceable creatinine methods.  https://jasn.asnjournals.org/content/early/2021/09/22/ASN.6280058847    Calcium 04/29/2024 9.3  8.6 - 10.6 mg/dL Final    Albumin 04/29/2024 3.7  3.4 - 5.0 g/dL Final    Alkaline Phosphatase 04/29/2024 109  33 - 110 U/L Final    Total Protein 04/29/2024 6.6  6.4 - 8.2 g/dL Final    AST 04/29/2024 18  9 - 39 U/L Final    Bilirubin, Total 04/29/2024 0.2  0.0 - 1.2 mg/dL Final    ALT 04/29/2024 18  7 - 45 U/L Final    Patients treated with Sulfasalazine may generate falsely decreased results for ALT.

## 2024-04-30 LAB — BACTERIA UR CULT: NORMAL

## 2024-05-01 ENCOUNTER — APPOINTMENT (OUTPATIENT)
Dept: OBSTETRICS AND GYNECOLOGY | Facility: CLINIC | Age: 34
End: 2024-05-01
Payer: MEDICAID

## 2024-05-01 ENCOUNTER — HOSPITAL ENCOUNTER (OUTPATIENT)
Dept: RADIOLOGY | Facility: CLINIC | Age: 34
Discharge: HOME | End: 2024-05-01
Payer: MEDICAID

## 2024-05-01 DIAGNOSIS — Z3A.34 34 WEEKS GESTATION OF PREGNANCY (HHS-HCC): ICD-10-CM

## 2024-05-01 DIAGNOSIS — O99.213 OBESITY COMPLICATING PREGNANCY, THIRD TRIMESTER (HHS-HCC): ICD-10-CM

## 2024-05-01 DIAGNOSIS — Z34.90 PREGNANT (HHS-HCC): ICD-10-CM

## 2024-05-01 PROCEDURE — 76816 OB US FOLLOW-UP PER FETUS: CPT

## 2024-05-01 PROCEDURE — 76816 OB US FOLLOW-UP PER FETUS: CPT | Performed by: OBSTETRICS & GYNECOLOGY

## 2024-05-01 PROCEDURE — 76819 FETAL BIOPHYS PROFIL W/O NST: CPT | Performed by: OBSTETRICS & GYNECOLOGY

## 2024-05-01 RX ORDER — PRENATAL VIT 49/IRON FUM/FOLIC 6.75-0.2MG
1 TABLET ORAL DAILY
Qty: 90 TABLET | Refills: 0 | Status: SHIPPED | OUTPATIENT
Start: 2024-05-01 | End: 2024-05-02 | Stop reason: SDUPTHER

## 2024-05-02 ENCOUNTER — ROUTINE PRENATAL (OUTPATIENT)
Dept: OBSTETRICS AND GYNECOLOGY | Facility: CLINIC | Age: 34
End: 2024-05-02
Payer: MEDICAID

## 2024-05-02 VITALS — DIASTOLIC BLOOD PRESSURE: 72 MMHG | BODY MASS INDEX: 37.55 KG/M2 | WEIGHT: 212 LBS | SYSTOLIC BLOOD PRESSURE: 116 MMHG

## 2024-05-02 DIAGNOSIS — Z3A.36 36 WEEKS GESTATION OF PREGNANCY (HHS-HCC): ICD-10-CM

## 2024-05-02 DIAGNOSIS — F41.9 ANXIETY DISORDER, UNSPECIFIED TYPE: ICD-10-CM

## 2024-05-02 DIAGNOSIS — O99.343 BIPOLAR DISEASE DURING PREGNANCY IN THIRD TRIMESTER (MULTI): ICD-10-CM

## 2024-05-02 DIAGNOSIS — F31.10 BIPOLAR DISORDER, MOST RECENT EPISODE MANIC (MULTI): Chronic | ICD-10-CM

## 2024-05-02 DIAGNOSIS — Z78.9 REFUSAL OF BLOOD PRODUCT: ICD-10-CM

## 2024-05-02 DIAGNOSIS — F31.9 BIPOLAR DISEASE DURING PREGNANCY IN THIRD TRIMESTER (MULTI): ICD-10-CM

## 2024-05-02 DIAGNOSIS — O99.210 OBESITY IN PREGNANCY (HHS-HCC): ICD-10-CM

## 2024-05-02 PROCEDURE — 99214 OFFICE O/P EST MOD 30 MIN: CPT | Performed by: OBSTETRICS & GYNECOLOGY

## 2024-05-02 PROCEDURE — 87081 CULTURE SCREEN ONLY: CPT

## 2024-05-02 RX ORDER — PRENATAL VIT 49/IRON FUM/FOLIC 6.75-0.2MG
1 TABLET ORAL DAILY
Qty: 90 TABLET | Refills: 0 | Status: SHIPPED | OUTPATIENT
Start: 2024-05-02 | End: 2024-05-15 | Stop reason: HOSPADM

## 2024-05-02 NOTE — PROGRESS NOTES
Subjective   Patient ID [id number]   Julisa Parnell is a 34 y.o.  at 36w2d with a working estimated date of delivery of 2024, by Last Menstrual Period who presents for a routine prenatal visit. She denies vaginal bleeding, leakage of fluid, decreased fetal movements, or contractions.  Requesting Breast pump.     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 She is taking an iron supplement- will increase to twice daily. Eating an iron rich diet as well.  Discussed risk of bleeding with labor and delivery, including risk of death. Patient understands this. She is willing to except blood components and cell saver, as well as albumin. Has been researching the different options. Her mother is her power of - will bring documentation with her to hospital. Discussed that we would go over list of options on admission with her to clarify her wishes.           Other Visit Diagnoses       36 weeks gestation of pregnancy ([facility])        Relevant Medications    prenatal vit 49-iron fum-folic (Mini Prenatal) 6.75 mg iron- 200 mcg tablet    Other Relevant Orders    Group B Streptococcus (GBS) Prenatal Screen, Culture           Objective   Physical Exam:   Weight: 96.2 kg (212 lb)  Expected Total Weight Gain: 5 kg (11 lb)-9 kg (19 lb)   Pregravid BMI: 35.08  BP: 116/72  Fetal Heart Rate: 140 Fundal Height (cm): 34 cm Presentation: Vertex  Dilation: Closed Effacement (%): 0 Fetal Station: -2    Prenatal Labs  Urine Dip:  Lab Results   Component Value Date    KETONESU NEGATIVE 2024      Lab Results   Component Value Date    HGB 11.4 (L) 04/29/2024    HCT 34.0 (L) 04/29/2024    ABO O 03/20/2024    HEPBSAG Nonreactive 10/19/2023     Imaging  5/1/24 Growth at 34%      Assessment/Plan   Problem List Items Addressed This Visit             ICD-10-CM    Bipolar disease during pregnancy in third trimester (Multi) O99.343, F31.9    Bipolar disorder, most recent episode manic (Multi) (Chronic) F31.10    Obesity in pregnancy ([facility]) O99.210    Relevant Orders    Group B Streptococcus (GBS) Prenatal Screen, Culture    Anxiety disorder, unspecified F41.9    Refusal of blood product Z78.9     Other Visit Diagnoses         Codes    36 weeks gestation of pregnancy ([facility])     Z3A.36    Relevant Medications    prenatal vit 49-iron fum-folic (Mini Prenatal) 6.75 mg iron- 200 mcg tablet    Other Relevant Orders    Group B Streptococcus (GBS) Prenatal Screen, Culture          Continue prenatal vitamin.  Labs reviewed.  GBS taken.  Breast pump  Expected mode of delivery vaginal.   Follow up in 1 week for a routine prenatal visit.

## 2024-05-03 ENCOUNTER — PATIENT MESSAGE (OUTPATIENT)
Dept: BEHAVIORAL HEALTH | Facility: CLINIC | Age: 34
End: 2024-05-03
Payer: MEDICAID

## 2024-05-03 DIAGNOSIS — F31.9 BIPOLAR 1 DISORDER (MULTI): ICD-10-CM

## 2024-05-03 DIAGNOSIS — Z3A.36 36 WEEKS GESTATION OF PREGNANCY (HHS-HCC): ICD-10-CM

## 2024-05-05 LAB — GP B STREP GENITAL QL CULT: ABNORMAL

## 2024-05-07 ENCOUNTER — TELEPHONE (OUTPATIENT)
Dept: BEHAVIORAL HEALTH | Facility: CLINIC | Age: 34
End: 2024-05-07
Payer: MEDICAID

## 2024-05-07 RX ORDER — SERTRALINE HYDROCHLORIDE 50 MG/1
50 TABLET, FILM COATED ORAL DAILY
Qty: 90 TABLET | Refills: 1 | Status: SHIPPED | OUTPATIENT
Start: 2024-05-07 | End: 2025-05-07

## 2024-05-07 NOTE — PROGRESS NOTES
This encounter was created by error. Patient called but spoke with another . The patient was called earlier to schedule a sooner appointment with Margareth Alvarado. Patient already has an appointment with Margareth Alvarado.     Have a good day,     Esther Riley  Patient Access Representative [facility]   Dept[facility] of [facility]iatry  [facility]  [address]   [place] [address]  Ph: [phone]  Fax: [phone]  Email: [email or web address]

## 2024-05-08 ENCOUNTER — ROUTINE PRENATAL (OUTPATIENT)
Dept: OBSTETRICS AND GYNECOLOGY | Facility: CLINIC | Age: 34
End: 2024-05-08
Payer: MEDICAID

## 2024-05-08 ENCOUNTER — SOCIAL WORK (OUTPATIENT)
Dept: BEHAVIORAL HEALTH | Facility: CLINIC | Age: 34
End: 2024-05-08
Payer: MEDICAID

## 2024-05-08 ENCOUNTER — TELEPHONE (OUTPATIENT)
Dept: OBSTETRICS AND GYNECOLOGY | Facility: CLINIC | Age: 34
End: 2024-05-08

## 2024-05-08 VITALS — BODY MASS INDEX: 37.91 KG/M2 | WEIGHT: 214 LBS | DIASTOLIC BLOOD PRESSURE: 72 MMHG | SYSTOLIC BLOOD PRESSURE: 124 MMHG

## 2024-05-08 DIAGNOSIS — Z34.90 ENCOUNTER FOR ELECTIVE INDUCTION OF LABOR (HHS-HCC): ICD-10-CM

## 2024-05-08 DIAGNOSIS — O99.343 BIPOLAR DISEASE DURING PREGNANCY IN THIRD TRIMESTER (MULTI): ICD-10-CM

## 2024-05-08 DIAGNOSIS — F31.9 BIPOLAR 1 DISORDER (MULTI): ICD-10-CM

## 2024-05-08 DIAGNOSIS — Z34.93 PRENATAL CARE IN THIRD TRIMESTER (HHS-HCC): Primary | ICD-10-CM

## 2024-05-08 DIAGNOSIS — O99.210 OBESITY IN PREGNANCY (HHS-HCC): ICD-10-CM

## 2024-05-08 DIAGNOSIS — B95.1 POSITIVE GBS TEST: ICD-10-CM

## 2024-05-08 DIAGNOSIS — Z3A.37 37 WEEKS GESTATION OF PREGNANCY (HHS-HCC): ICD-10-CM

## 2024-05-08 DIAGNOSIS — F31.9 BIPOLAR DISEASE DURING PREGNANCY IN THIRD TRIMESTER (MULTI): ICD-10-CM

## 2024-05-08 DIAGNOSIS — F41.9 ANXIETY DISORDER, UNSPECIFIED TYPE: ICD-10-CM

## 2024-05-08 DIAGNOSIS — Z78.9 REFUSAL OF BLOOD PRODUCT: ICD-10-CM

## 2024-05-08 PROCEDURE — 99213 OFFICE O/P EST LOW 20 MIN: CPT

## 2024-05-08 PROCEDURE — 90837 PSYTX W PT 60 MINUTES: CPT

## 2024-05-08 NOTE — TELEPHONE ENCOUNTER
----- Message from JIM Ayala sent at 5/8/2024  1:44 PM EDT -----  Hi this patient would like an induction at [facility] for 5/21/24. Preferably in the afternoon. Let me know if there are any concerns/ questions. This is just elective.

## 2024-05-08 NOTE — PROGRESS NOTES
IOL scheduled for   Spoke to pt verified by name/.  Pt informed that scheduled time of 8 am and induction information emailed/reviewed to pt.  Pt verbalized understanding and denies having any further questions.

## 2024-05-08 NOTE — PROGRESS NOTES
Assessment/Plan   Diagnoses and all orders for this visit:  Prenatal care in third trimester ([facility])  Bipolar disease during pregnancy in third trimester (Multi)  Obesity in pregnancy ([facility])  Anxiety disorder, unspecified type  Refusal of blood product  37 weeks gestation of pregnancy ([facility])  Encounter for elective induction of labor ([facility])  -     Labor Induction; Future  Positive GBS test      Coping mechanisms and pain management options during labor discussed, patient plans epidural  Discussed routine GBS screening, positive   surveillance weekly for BMI, had US this week. Plan for NST next week.   Discussed expectant management vs. scheduling term IOL, patient requeting rrIOL @39 weeks.   Discussed expectations and methods used for IOL  Reviewed s/sx of labor, warning signs, fetal movement counts, and when to call provider  Follow up in 1 week for a routine prenatal visit.    JIM Ayala     Julisa Parnell is a 34 y.o.  at 37w2d with a working estimated date of delivery of 2024, by Last Menstrual Period who presents for a routine prenatal visit. She denies vaginal bleeding, leakage of fluid, decreased fetal movements, or contractions.  Pt endorses no questions or concerns.     Her pregnancy is complicated by:  Pregnancy Problems (from 10/19/23 to present)       Problem Noted Resolved    Positive GBS test 2024 by JIM Ayala No    Priority:  Medium      Refusal of blood product 3/20/2024 by JIM Ayala No    Priority:  Medium      Overview Signed 2024  1:08 PM by Stephanie Ansari MD     24 Patient is a Pentecostal. 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 She is taking an iron supplement- will increase to twice daily. Eating an iron rich diet as well.  Discussed risk of bleeding with labor and delivery, including risk of death. Patient understands this. She is willing to except blood components and cell  saver, as well as albumin. Has been researching the different options. Her mother is her power of - will bring documentation with her to hospital. Discussed that we would go over list of options on admission with her to clarify her wishes.          Bipolar disease during pregnancy in third trimester (Multi) 10/14/2023 by Blessing Torres No    Priority:  Medium      Obesity in pregnancy ([facility]) 3/3/2023 by Lauren Landa, RN No    Priority:  Medium      Bipolar disorder, most recent episode manic (Multi) 9/10/2021 by Lauren Landa, RN No    Priority:  Medium      Anxiety disorder, unspecified 9/10/2021 by Lauren Landa, RN No    Priority:  Medium               Objective   Physical Exam:   Weight: 97.1 kg (214 lb)  Expected Total Weight Gain: 5 kg (11 lb)-9 kg (19 lb)   Pregravid BMI: 35.08  BP: 124/72  Fetal Heart Rate: 145 Fundal Height (cm): 37 cm Presentation: Vertex

## 2024-05-08 NOTE — PSYCHOTHERAPY
"Will be induced on May 21st   Feels excited, feels ready, no energy, off work now  Got a lot of things at the shower  Delivery room - her mom and boyfriend    Brother lives in [place], plans to come up  Mom reached out to her father w/ ultrasound    Thinks her father will contact her, could visit  She feels a little \"over it\" ,he's annoying  Has had times of feeling angry and sad  Just living her life   Went to [facility] w/ mom on Sunday    Gone for two years, no one really recognized her  Healthy to move on  Mom is constantly consumed about what other people think    She used to do it, comparing   Her first marriage was conditional (like her dad), volunteer work, work  Had to perform, marriage felt about duty    A lot of pressure on her. Feels relief now about her relationshp  Father is conditional -   Uncle stopped by - mom said surprised her dad hasn't reached out    He's not part of the Hinduism, 2. Baby out of wedlock    It's hurtful. Is a harmless person  Has a sense of relief - just a regular girl  Her life feels very full    Had a breakdown due to mental health, seeing a psychologist last year of the marriage, stressed out    Asked her  if she could step down a bit - her speaking up, cry for help. Getting anxiety attacks at [facility]  She couldn't pay her part of the bills   wasn't sensitive to that  He had emotional affairs, porn addiction  Intimacy issues in marriage    Felt like he was a hypocrite, it disgusted her. She spoke up to him.   She wasn't happy, living a facade  Everything got turned against her, she was supposed to be submissive    She got manic - unnatural burst of energy, drinking more, more active  Triggered by stress, not well, high sex drive    A lot of things she didn't get to say to him. He wrote her off immediately  Felt like he was throwing her out like trash. Hurtful    He never had any emotion, he shut down  She wasn't allowed to speak up, tell her how he " felt    Last few months leading to the divorce, didn't even argue  He was calculated, emotional checklists  He had a twin brother he had emotion with  Let go of things she can't control    Practicing acceptance - it is sad to her  Her brother is butler    Stay focused on her own freedom to do differently

## 2024-05-08 NOTE — TELEPHONE ENCOUNTER
Attempted to call pt for give IOL information.  Pt did not answer, left VM to return call to clinic.    IOL scheduled for 2024  Spoke to pt verified by name/.  Pt informed that scheduled time of 8 am and induction information emailed/reviewed to pt.  Pt verbalized understanding and denies having any further questions.

## 2024-05-09 PROBLEM — B95.1 POSITIVE GBS TEST: Status: ACTIVE | Noted: 2024-05-09

## 2024-05-09 NOTE — TELEPHONE ENCOUNTER
IOL scheduled for 2024  Spoke to pt verified by name/.  Pt informed that scheduled time of 6 pm and induction information emailed to pt.  Pt verbalized understanding and denies having any further questions.

## 2024-05-09 NOTE — PROGRESS NOTES
Therapy Session  Progress Note    Session Type: Virtual    Start Time: 2:00 pm  End Time: 2:55 pm    Appointment: Scheduled    Reason for Visit: Bipolar Disorder    Patient Symptoms/Interval History: Feeling excited about the birth of her baby. Has taken off of work, feels prepared. Has support from her mother, unsure if her father will visit. Went to the [facility] [facility], said people didn't recognize her. Feels hurt that her father doesn't look beyond certain things in her life and want a relationship. Feels like her life is moving forward regardless of this.    Mental Status: Alert & Oriented x3    Functional Status: No impairment    Interventions Provided: CBT, Strengths Exploration    Progress Made: Moderate    Response to Interventions: The patient was receptive to the interventions provided.    Action Plan/Homework: The patient will be induced on 5/21.     Treatment Plan: Use CBT to help the patient gain awareness of unhelpful thought patterns and core beliefs. Help the patient learn healthy coping skills.     Follow Up/Next Appointment: Follow up in 2  weeks.      MAYLIN Yanes, CHARLIE-S    **This visit was performed using real-time telehealth tools, including an audio/video connection between the patient and this provider. The patient provided consent for the individual telemedicine service and understands the limitations of the visit.

## 2024-05-10 ENCOUNTER — TELEMEDICINE (OUTPATIENT)
Dept: BEHAVIORAL HEALTH | Facility: CLINIC | Age: 34
End: 2024-05-10
Payer: MEDICAID

## 2024-05-10 DIAGNOSIS — F31.9 BIPOLAR 1 DISORDER (MULTI): ICD-10-CM

## 2024-05-10 PROCEDURE — 99214 OFFICE O/P EST MOD 30 MIN: CPT | Performed by: CLINICAL NURSE SPECIALIST

## 2024-05-10 RX ORDER — LURASIDONE HYDROCHLORIDE 60 MG/1
60 TABLET, FILM COATED ORAL DAILY
Qty: 30 TABLET | Refills: 2 | Status: SHIPPED | OUTPATIENT
Start: 2024-05-10 | End: 2025-05-10

## 2024-05-10 RX ORDER — LORAZEPAM 0.5 MG/1
0.5 TABLET ORAL 3 TIMES WEEKLY
Qty: 10 TABLET | Refills: 0 | Status: SHIPPED | OUTPATIENT
Start: 2024-05-10 | End: 2024-05-31

## 2024-05-10 NOTE — PROGRESS NOTES
Subjective   Patient ID: Julisa Parnell is a 34 y.o. female who presents for BPAD.     HPI  Review of Systems   All other systems reviewed and are negative.   Psych Review of Symptoms  Objective   Physical Exam  Psychiatric:         Attention and Perception: Attention and perception normal.         Mood and Affect: Affect normal. Mood is anxious.         Speech: Speech normal.         Behavior: Behavior normal. Behavior is cooperative.         Thought Content: Thought content normal.         Cognition and Memory: Cognition and memory normal.         Judgment: Judgment normal.   INTERIM: Now at  37wga EDC 24. Added FESO4 supplement, managing, feeling supported by family , FOB. Now  off from work, able to relax, some stress not having money, energy.  Still working w/ therapist.  Past few weeks had episodes of anxiety, ie restless, affecting sleep, Unisom does help, anxiety attacks only occurs at night, due to unknowns of labor and delivery, uncertainties of motherhood. At these times feels panicky scared,  occur few times per week, cannot sit still, feeling overwhelmed,  want do do something, fidgety, uses deep breathing, sometime clean, gets snack, used to use Clonazepam but not now.  Taking Sertraline 50mg at bedtime x 1 month now and feels better.           Assessment/Plan   IMP:  IMP: 34 year old female who presents now with Bipolar 1 Disorder and now 37 WGA.  Taking Latuda 60 mg and 50 mg Sertraline every  day for past month.  Continuing therapy biweekly . Also stopped work  trying to relax, and attending therapy.  Still with the FOB, but not living together. Interested in starting  IOP. Discussed starting low dose of Lorazepam 0.gm t be used only 3 times per week for sever anxiety.    Diagnoses   BPAD 1   Third Trimester Unplanned Pregnancy   Single Status       Treatment       Begin Lorazepam 0.5mg po take 1/2 to 1 up to 3 time per week ordered      Cont Latuda to 60 mg po at bedtime  renewed       Continue Sertraline 50 mg every ay renewed     RTC 1 week postpartum       Contact provider via My Chart as needed     Refer to  PN IOP as pt is able to decrease work demands

## 2024-05-11 ENCOUNTER — HOSPITAL ENCOUNTER (OUTPATIENT)
Facility: HOSPITAL | Age: 34
Setting detail: OBSERVATION
Discharge: ANOTHER HOSPITAL | End: 2024-05-11
Attending: OBSTETRICS & GYNECOLOGY | Admitting: ADVANCED PRACTICE MIDWIFE
Payer: MEDICAID

## 2024-05-11 ENCOUNTER — HOSPITAL ENCOUNTER (INPATIENT)
Facility: HOSPITAL | Age: 34
LOS: 4 days | Discharge: HOME | End: 2024-05-15
Attending: STUDENT IN AN ORGANIZED HEALTH CARE EDUCATION/TRAINING PROGRAM
Payer: MEDICAID

## 2024-05-11 VITALS
OXYGEN SATURATION: 99 % | DIASTOLIC BLOOD PRESSURE: 66 MMHG | RESPIRATION RATE: 18 BRPM | TEMPERATURE: 97.9 F | HEART RATE: 84 BPM | SYSTOLIC BLOOD PRESSURE: 114 MMHG

## 2024-05-11 PROBLEM — Z3A.37 37 WEEKS GESTATION OF PREGNANCY (HHS-HCC): Status: ACTIVE | Noted: 2024-05-11

## 2024-05-11 PROBLEM — R53.83 MALAISE AND FATIGUE: Status: RESOLVED | Noted: 2023-10-14 | Resolved: 2024-05-11

## 2024-05-11 PROBLEM — S01.332A COMPLICATION OF LEFT EAR PIERCING: Status: RESOLVED | Noted: 2023-10-14 | Resolved: 2024-05-11

## 2024-05-11 PROBLEM — R53.81 MALAISE AND FATIGUE: Status: RESOLVED | Noted: 2023-10-14 | Resolved: 2024-05-11

## 2024-05-11 PROBLEM — R45.89 DEPRESSED MOOD: Status: RESOLVED | Noted: 2023-10-14 | Resolved: 2024-05-11

## 2024-05-11 PROBLEM — M54.50 LUMBAR PAIN: Status: RESOLVED | Noted: 2023-10-14 | Resolved: 2024-05-11

## 2024-05-11 PROBLEM — O28.8 NON-REACTIVE NST (NON-STRESS TEST): Status: ACTIVE | Noted: 2024-05-11

## 2024-05-11 PROBLEM — R53.83 FATIGUE: Status: RESOLVED | Noted: 2023-10-14 | Resolved: 2024-05-11

## 2024-05-11 PROBLEM — N92.6 MISSED MENSES: Status: RESOLVED | Noted: 2023-10-14 | Resolved: 2024-05-11

## 2024-05-11 PROBLEM — Z34.90 ENCOUNTER FOR INDUCTION OF LABOR (HHS-HCC): Status: ACTIVE | Noted: 2024-05-11

## 2024-05-11 PROBLEM — R45.89 DEPRESSED AFFECT: Status: RESOLVED | Noted: 2023-10-14 | Resolved: 2024-05-11

## 2024-05-11 PROBLEM — R06.00 DYSPNEA: Status: RESOLVED | Noted: 2023-10-14 | Resolved: 2024-05-11

## 2024-05-11 LAB
ABO GROUP (TYPE) IN BLOOD: NORMAL
ANTIBODY SCREEN: NORMAL
ERYTHROCYTE [DISTWIDTH] IN BLOOD BY AUTOMATED COUNT: 13.3 % (ref 11.5–14.5)
HCT VFR BLD AUTO: 33.3 % (ref 36–46)
HGB BLD-MCNC: 11.4 G/DL (ref 12–16)
MCH RBC QN AUTO: 29.9 PG (ref 26–34)
MCHC RBC AUTO-ENTMCNC: 34.2 G/DL (ref 32–36)
MCV RBC AUTO: 87 FL (ref 80–100)
NRBC BLD-RTO: 0 /100 WBCS (ref 0–0)
PLATELET # BLD AUTO: 291 X10*3/UL (ref 150–450)
RBC # BLD AUTO: 3.81 X10*6/UL (ref 4–5.2)
RH FACTOR (ANTIGEN D): NORMAL
WBC # BLD AUTO: 9.8 X10*3/UL (ref 4.4–11.3)

## 2024-05-11 PROCEDURE — 86780 TREPONEMA PALLIDUM: CPT | Mod: AHULAB | Performed by: ADVANCED PRACTICE MIDWIFE

## 2024-05-11 PROCEDURE — 85027 COMPLETE CBC AUTOMATED: CPT | Performed by: ADVANCED PRACTICE MIDWIFE

## 2024-05-11 PROCEDURE — 1120000001 HC OB PRIVATE ROOM DAILY

## 2024-05-11 PROCEDURE — 59200 INSERT CERVICAL DILATOR: CPT

## 2024-05-11 PROCEDURE — 36415 COLL VENOUS BLD VENIPUNCTURE: CPT | Performed by: ADVANCED PRACTICE MIDWIFE

## 2024-05-11 PROCEDURE — 86901 BLOOD TYPING SEROLOGIC RH(D): CPT | Performed by: ADVANCED PRACTICE MIDWIFE

## 2024-05-11 PROCEDURE — 2500000004 HC RX 250 GENERAL PHARMACY W/ HCPCS (ALT 636 FOR OP/ED): Performed by: ADVANCED PRACTICE MIDWIFE

## 2024-05-11 PROCEDURE — 1100000001 HC PRIVATE ROOM DAILY

## 2024-05-11 PROCEDURE — 99223 1ST HOSP IP/OBS HIGH 75: CPT | Performed by: ADVANCED PRACTICE MIDWIFE

## 2024-05-11 PROCEDURE — G0378 HOSPITAL OBSERVATION PER HR: HCPCS

## 2024-05-11 RX ORDER — METOCLOPRAMIDE 10 MG/1
10 TABLET ORAL EVERY 6 HOURS PRN
Status: DISCONTINUED | OUTPATIENT
Start: 2024-05-11 | End: 2024-05-11 | Stop reason: HOSPADM

## 2024-05-11 RX ORDER — HYDRALAZINE HYDROCHLORIDE 20 MG/ML
5 INJECTION INTRAMUSCULAR; INTRAVENOUS ONCE AS NEEDED
Status: DISCONTINUED | OUTPATIENT
Start: 2024-05-11 | End: 2024-05-11 | Stop reason: HOSPADM

## 2024-05-11 RX ORDER — OXYTOCIN 10 [USP'U]/ML
10 INJECTION, SOLUTION INTRAMUSCULAR; INTRAVENOUS ONCE AS NEEDED
Status: DISCONTINUED | OUTPATIENT
Start: 2024-05-11 | End: 2024-05-11 | Stop reason: HOSPADM

## 2024-05-11 RX ORDER — NIFEDIPINE 10 MG/1
10 CAPSULE ORAL ONCE AS NEEDED
Status: DISCONTINUED | OUTPATIENT
Start: 2024-05-11 | End: 2024-05-11 | Stop reason: HOSPADM

## 2024-05-11 RX ORDER — SODIUM CHLORIDE, SODIUM LACTATE, POTASSIUM CHLORIDE, CALCIUM CHLORIDE 600; 310; 30; 20 MG/100ML; MG/100ML; MG/100ML; MG/100ML
125 INJECTION, SOLUTION INTRAVENOUS CONTINUOUS
Status: DISCONTINUED | OUTPATIENT
Start: 2024-05-11 | End: 2024-05-11 | Stop reason: HOSPADM

## 2024-05-11 RX ORDER — ONDANSETRON HYDROCHLORIDE 2 MG/ML
4 INJECTION, SOLUTION INTRAVENOUS EVERY 6 HOURS PRN
Status: DISCONTINUED | OUTPATIENT
Start: 2024-05-11 | End: 2024-05-13

## 2024-05-11 RX ORDER — FENTANYL/ROPIVACAINE/NS/PF 2MCG/ML-.2
0-25 PLASTIC BAG, INJECTION (ML) INJECTION CONTINUOUS
Status: DISCONTINUED | OUTPATIENT
Start: 2024-05-11 | End: 2024-05-11 | Stop reason: HOSPADM

## 2024-05-11 RX ORDER — MORPHINE SULFATE 4 MG/ML
1 INJECTION, SOLUTION INTRAMUSCULAR; INTRAVENOUS ONCE
Status: COMPLETED | OUTPATIENT
Start: 2024-05-11 | End: 2024-05-11

## 2024-05-11 RX ORDER — TRANEXAMIC ACID 100 MG/ML
1000 INJECTION, SOLUTION INTRAVENOUS ONCE AS NEEDED
Status: DISCONTINUED | OUTPATIENT
Start: 2024-05-11 | End: 2024-05-13 | Stop reason: HOSPADM

## 2024-05-11 RX ORDER — ACETAMINOPHEN 325 MG/1
975 TABLET ORAL ONCE
Status: COMPLETED | OUTPATIENT
Start: 2024-05-11 | End: 2024-05-11

## 2024-05-11 RX ORDER — LOPERAMIDE HYDROCHLORIDE 2 MG/1
4 CAPSULE ORAL EVERY 2 HOUR PRN
Status: DISCONTINUED | OUTPATIENT
Start: 2024-05-11 | End: 2024-05-11 | Stop reason: HOSPADM

## 2024-05-11 RX ORDER — ONDANSETRON HYDROCHLORIDE 2 MG/ML
4 INJECTION, SOLUTION INTRAVENOUS EVERY 6 HOURS PRN
Status: DISCONTINUED | OUTPATIENT
Start: 2024-05-11 | End: 2024-05-11 | Stop reason: HOSPADM

## 2024-05-11 RX ORDER — TERBUTALINE SULFATE 1 MG/ML
0.25 INJECTION SUBCUTANEOUS ONCE AS NEEDED
Status: DISCONTINUED | OUTPATIENT
Start: 2024-05-11 | End: 2024-05-11 | Stop reason: HOSPADM

## 2024-05-11 RX ORDER — SODIUM CHLORIDE, SODIUM LACTATE, POTASSIUM CHLORIDE, CALCIUM CHLORIDE 600; 310; 30; 20 MG/100ML; MG/100ML; MG/100ML; MG/100ML
125 INJECTION, SOLUTION INTRAVENOUS CONTINUOUS
Status: DISCONTINUED | OUTPATIENT
Start: 2024-05-12 | End: 2024-05-13

## 2024-05-11 RX ORDER — SERTRALINE HYDROCHLORIDE 50 MG/1
50 TABLET, FILM COATED ORAL DAILY
Status: DISCONTINUED | OUTPATIENT
Start: 2024-05-12 | End: 2024-05-12

## 2024-05-11 RX ORDER — MISOPROSTOL 200 UG/1
800 TABLET ORAL ONCE AS NEEDED
Status: DISCONTINUED | OUTPATIENT
Start: 2024-05-11 | End: 2024-05-11 | Stop reason: HOSPADM

## 2024-05-11 RX ORDER — LABETALOL HYDROCHLORIDE 5 MG/ML
20 INJECTION, SOLUTION INTRAVENOUS ONCE AS NEEDED
Status: DISCONTINUED | OUTPATIENT
Start: 2024-05-11 | End: 2024-05-11 | Stop reason: HOSPADM

## 2024-05-11 RX ORDER — OXYTOCIN 10 [USP'U]/ML
10 INJECTION, SOLUTION INTRAMUSCULAR; INTRAVENOUS ONCE AS NEEDED
Status: DISCONTINUED | OUTPATIENT
Start: 2024-05-11 | End: 2024-05-13 | Stop reason: HOSPADM

## 2024-05-11 RX ORDER — CARBOPROST TROMETHAMINE 250 UG/ML
250 INJECTION, SOLUTION INTRAMUSCULAR ONCE AS NEEDED
Status: DISCONTINUED | OUTPATIENT
Start: 2024-05-11 | End: 2024-05-11 | Stop reason: HOSPADM

## 2024-05-11 RX ORDER — METHYLERGONOVINE MALEATE 0.2 MG/ML
0.2 INJECTION INTRAVENOUS ONCE AS NEEDED
Status: DISCONTINUED | OUTPATIENT
Start: 2024-05-11 | End: 2024-05-11 | Stop reason: HOSPADM

## 2024-05-11 RX ORDER — LURASIDONE HYDROCHLORIDE 40 MG/1
60 TABLET, FILM COATED ORAL DAILY
Status: DISCONTINUED | OUTPATIENT
Start: 2024-05-11 | End: 2024-05-11 | Stop reason: HOSPADM

## 2024-05-11 RX ORDER — ONDANSETRON 4 MG/1
4 TABLET, FILM COATED ORAL EVERY 6 HOURS PRN
Status: DISCONTINUED | OUTPATIENT
Start: 2024-05-11 | End: 2024-05-11 | Stop reason: HOSPADM

## 2024-05-11 RX ORDER — LURASIDONE HYDROCHLORIDE 40 MG/1
40 TABLET, FILM COATED ORAL DAILY
Status: DISCONTINUED | OUTPATIENT
Start: 2024-05-12 | End: 2024-05-11

## 2024-05-11 RX ORDER — OXYTOCIN/0.9 % SODIUM CHLORIDE 30/500 ML
60 PLASTIC BAG, INJECTION (ML) INTRAVENOUS ONCE AS NEEDED
Status: DISCONTINUED | OUTPATIENT
Start: 2024-05-11 | End: 2024-05-13 | Stop reason: HOSPADM

## 2024-05-11 RX ORDER — SERTRALINE HYDROCHLORIDE 50 MG/1
50 TABLET, FILM COATED ORAL DAILY
Status: DISCONTINUED | OUTPATIENT
Start: 2024-05-11 | End: 2024-05-11 | Stop reason: HOSPADM

## 2024-05-11 RX ORDER — LABETALOL HYDROCHLORIDE 5 MG/ML
20 INJECTION, SOLUTION INTRAVENOUS ONCE AS NEEDED
Status: DISCONTINUED | OUTPATIENT
Start: 2024-05-11 | End: 2024-05-13 | Stop reason: HOSPADM

## 2024-05-11 RX ORDER — OXYTOCIN/0.9 % SODIUM CHLORIDE 30/500 ML
60 PLASTIC BAG, INJECTION (ML) INTRAVENOUS ONCE AS NEEDED
Status: DISCONTINUED | OUTPATIENT
Start: 2024-05-11 | End: 2024-05-11 | Stop reason: HOSPADM

## 2024-05-11 RX ORDER — LIDOCAINE HYDROCHLORIDE 10 MG/ML
30 INJECTION INFILTRATION; PERINEURAL ONCE AS NEEDED
Status: DISCONTINUED | OUTPATIENT
Start: 2024-05-11 | End: 2024-05-13 | Stop reason: HOSPADM

## 2024-05-11 RX ORDER — MISOPROSTOL 200 UG/1
800 TABLET ORAL ONCE AS NEEDED
Status: COMPLETED | OUTPATIENT
Start: 2024-05-11 | End: 2024-05-13

## 2024-05-11 RX ORDER — CARBOPROST TROMETHAMINE 250 UG/ML
250 INJECTION, SOLUTION INTRAMUSCULAR ONCE AS NEEDED
Status: DISCONTINUED | OUTPATIENT
Start: 2024-05-11 | End: 2024-05-13 | Stop reason: HOSPADM

## 2024-05-11 RX ORDER — LOPERAMIDE HYDROCHLORIDE 2 MG/1
4 CAPSULE ORAL EVERY 2 HOUR PRN
Status: DISCONTINUED | OUTPATIENT
Start: 2024-05-11 | End: 2024-05-13 | Stop reason: HOSPADM

## 2024-05-11 RX ORDER — PENICILLIN G 3000000 [IU]/50ML
3 INJECTION, SOLUTION INTRAVENOUS EVERY 4 HOURS
Status: DISCONTINUED | OUTPATIENT
Start: 2024-05-11 | End: 2024-05-11 | Stop reason: HOSPADM

## 2024-05-11 RX ORDER — METHYLERGONOVINE MALEATE 0.2 MG/ML
0.2 INJECTION INTRAVENOUS ONCE AS NEEDED
Status: DISCONTINUED | OUTPATIENT
Start: 2024-05-11 | End: 2024-05-13 | Stop reason: HOSPADM

## 2024-05-11 RX ORDER — METOCLOPRAMIDE HYDROCHLORIDE 5 MG/ML
10 INJECTION INTRAMUSCULAR; INTRAVENOUS EVERY 6 HOURS PRN
Status: DISCONTINUED | OUTPATIENT
Start: 2024-05-11 | End: 2024-05-11 | Stop reason: HOSPADM

## 2024-05-11 RX ORDER — TERBUTALINE SULFATE 1 MG/ML
0.25 INJECTION SUBCUTANEOUS ONCE AS NEEDED
Status: DISCONTINUED | OUTPATIENT
Start: 2024-05-11 | End: 2024-05-13 | Stop reason: HOSPADM

## 2024-05-11 RX ORDER — ONDANSETRON 4 MG/1
4 TABLET, FILM COATED ORAL EVERY 6 HOURS PRN
Status: DISCONTINUED | OUTPATIENT
Start: 2024-05-11 | End: 2024-05-13

## 2024-05-11 RX ORDER — NIFEDIPINE 10 MG/1
10 CAPSULE ORAL ONCE AS NEEDED
Status: DISCONTINUED | OUTPATIENT
Start: 2024-05-11 | End: 2024-05-13 | Stop reason: HOSPADM

## 2024-05-11 RX ORDER — LIDOCAINE HYDROCHLORIDE 10 MG/ML
30 INJECTION INFILTRATION; PERINEURAL ONCE AS NEEDED
Status: DISCONTINUED | OUTPATIENT
Start: 2024-05-11 | End: 2024-05-11 | Stop reason: HOSPADM

## 2024-05-11 RX ORDER — TRANEXAMIC ACID 100 MG/ML
1000 INJECTION, SOLUTION INTRAVENOUS ONCE AS NEEDED
Status: DISCONTINUED | OUTPATIENT
Start: 2024-05-11 | End: 2024-05-11 | Stop reason: HOSPADM

## 2024-05-11 RX ORDER — HYDRALAZINE HYDROCHLORIDE 20 MG/ML
5 INJECTION INTRAMUSCULAR; INTRAVENOUS ONCE AS NEEDED
Status: DISCONTINUED | OUTPATIENT
Start: 2024-05-11 | End: 2024-05-13 | Stop reason: HOSPADM

## 2024-05-11 RX ADMIN — PENICILLIN G POTASSIUM 5 MILLION UNITS: 5000000 INJECTION, POWDER, FOR SOLUTION INTRAMUSCULAR; INTRAVENOUS at 15:50

## 2024-05-11 RX ADMIN — SODIUM CHLORIDE, POTASSIUM CHLORIDE, SODIUM LACTATE AND CALCIUM CHLORIDE 125 ML/HR: 600; 310; 30; 20 INJECTION, SOLUTION INTRAVENOUS at 15:46

## 2024-05-11 RX ADMIN — PENICILLIN G 3 MILLION UNITS: 3000000 INJECTION, SOLUTION INTRAVENOUS at 20:28

## 2024-05-11 RX ADMIN — MORPHINE SULFATE 1 MG: 4 INJECTION, SOLUTION INTRAMUSCULAR; INTRAVENOUS at 18:30

## 2024-05-11 RX ADMIN — ACETAMINOPHEN 975 MG: 325 TABLET ORAL at 12:20

## 2024-05-11 SDOH — SOCIAL STABILITY: SOCIAL NETWORK
DO YOU BELONG TO ANY CLUBS OR ORGANIZATIONS SUCH AS CHURCH GROUPS UNIONS, FRATERNAL OR ATHLETIC GROUPS, OR SCHOOL GROUPS?: NO

## 2024-05-11 SDOH — ECONOMIC STABILITY: FOOD INSECURITY: WITHIN THE PAST 12 MONTHS, THE FOOD YOU BOUGHT JUST DIDN'T LAST AND YOU DIDN'T HAVE MONEY TO GET MORE.: NEVER TRUE

## 2024-05-11 SDOH — SOCIAL STABILITY: SOCIAL INSECURITY: WITHIN THE LAST YEAR, HAVE YOU BEEN AFRAID OF YOUR PARTNER OR EX-PARTNER?: NO

## 2024-05-11 SDOH — SOCIAL STABILITY: SOCIAL INSECURITY: DOES ANYONE TRY TO KEEP YOU FROM HAVING/CONTACTING OTHER FRIENDS OR DOING THINGS OUTSIDE YOUR HOME?: NO

## 2024-05-11 SDOH — HEALTH STABILITY: PHYSICAL HEALTH: ON AVERAGE, HOW MANY MINUTES DO YOU ENGAGE IN EXERCISE AT THIS LEVEL?: 30 MIN

## 2024-05-11 SDOH — SOCIAL STABILITY: SOCIAL INSECURITY
WITHIN THE LAST YEAR, HAVE YOU BEEN RAPED OR FORCED TO HAVE ANY KIND OF SEXUAL ACTIVITY BY YOUR PARTNER OR EX-PARTNER?: NO

## 2024-05-11 SDOH — HEALTH STABILITY: MENTAL HEALTH: HOW MANY DRINKS CONTAINING ALCOHOL DO YOU HAVE ON A TYPICAL DAY WHEN YOU ARE DRINKING?: PATIENT DOES NOT DRINK

## 2024-05-11 SDOH — SOCIAL STABILITY: SOCIAL INSECURITY: VERBAL ABUSE: DENIES

## 2024-05-11 SDOH — HEALTH STABILITY: MENTAL HEALTH: HOW OFTEN DO YOU HAVE A DRINK CONTAINING ALCOHOL?: NEVER

## 2024-05-11 SDOH — ECONOMIC STABILITY: FOOD INSECURITY: WITHIN THE PAST 12 MONTHS, YOU WORRIED THAT YOUR FOOD WOULD RUN OUT BEFORE YOU GOT THE MONEY TO BUY MORE.: NEVER TRUE

## 2024-05-11 SDOH — ECONOMIC STABILITY: TRANSPORTATION INSECURITY
IN THE PAST 12 MONTHS, HAS THE LACK OF TRANSPORTATION KEPT YOU FROM MEDICAL APPOINTMENTS OR FROM GETTING MEDICATIONS?: NO

## 2024-05-11 SDOH — SOCIAL STABILITY: SOCIAL INSECURITY
WITHIN THE LAST YEAR, HAVE YOU BEEN KICKED, HIT, SLAPPED, OR OTHERWISE PHYSICALLY HURT BY YOUR PARTNER OR EX-PARTNER?: NO

## 2024-05-11 SDOH — SOCIAL STABILITY: SOCIAL NETWORK
IN A TYPICAL WEEK, HOW MANY TIMES DO YOU TALK ON THE PHONE WITH FAMILY, FRIENDS, OR NEIGHBORS?: MORE THAN THREE TIMES A WEEK

## 2024-05-11 SDOH — SOCIAL STABILITY: SOCIAL NETWORK: HOW OFTEN DO YOU GET TOGETHER WITH FRIENDS OR RELATIVES?: MORE THAN THREE TIMES A WEEK

## 2024-05-11 SDOH — SOCIAL STABILITY: SOCIAL INSECURITY
WITHIN THE LAST YEAR, HAVE TO BEEN RAPED OR FORCED TO HAVE ANY KIND OF SEXUAL ACTIVITY BY YOUR PARTNER OR EX-PARTNER?: NO

## 2024-05-11 SDOH — SOCIAL STABILITY: SOCIAL NETWORK: HOW OFTEN DO YOU ATTEND CHURCH OR RELIGIOUS SERVICES?: MORE THAN 4 TIMES PER YEAR

## 2024-05-11 SDOH — SOCIAL STABILITY: SOCIAL NETWORK: HOW OFTEN DO YOU ATTEND MEETINGS OF THE CLUBS OR ORGANIZATIONS YOU BELONG TO?: NEVER

## 2024-05-11 SDOH — ECONOMIC STABILITY: FOOD INSECURITY: HOW HARD IS IT FOR YOU TO PAY FOR THE VERY BASICS LIKE FOOD, HOUSING, MEDICAL CARE, AND HEATING?: NOT HARD AT ALL

## 2024-05-11 SDOH — SOCIAL STABILITY: SOCIAL INSECURITY: WITHIN THE LAST YEAR, HAVE YOU BEEN HUMILIATED OR EMOTIONALLY ABUSED IN OTHER WAYS BY YOUR PARTNER OR EX-PARTNER?: NO

## 2024-05-11 SDOH — SOCIAL STABILITY: SOCIAL INSECURITY: HAS ANYONE EVER THREATENED TO HURT YOUR FAMILY OR YOUR PETS?: NO

## 2024-05-11 SDOH — SOCIAL STABILITY: SOCIAL NETWORK: HOW OFTEN DO YOU ATTENT MEETINGS OF THE CLUB OR ORGANIZATION YOU BELONG TO?: NEVER

## 2024-05-11 SDOH — HEALTH STABILITY: MENTAL HEALTH
STRESS IS WHEN SOMEONE FEELS TENSE, NERVOUS, ANXIOUS, OR CAN'T SLEEP AT NIGHT BECAUSE THEIR MIND IS TROUBLED. HOW STRESSED ARE YOU?: NOT AT ALL

## 2024-05-11 SDOH — SOCIAL STABILITY: SOCIAL NETWORK
DO YOU BELONG TO ANY CLUBS OR ORGANIZATIONS SUCH AS CHURCH GROUPS, UNIONS, FRATERNAL OR ATHLETIC GROUPS, OR SCHOOL GROUPS?: NO

## 2024-05-11 SDOH — SOCIAL STABILITY: SOCIAL INSECURITY: ARE THERE ANY APPARENT SIGNS OF INJURIES/BEHAVIORS THAT COULD BE RELATED TO ABUSE/NEGLECT?: NO

## 2024-05-11 SDOH — SOCIAL STABILITY: SOCIAL INSECURITY: HAVE YOU HAD ANY THOUGHTS OF HARMING ANYONE ELSE?: NO

## 2024-05-11 SDOH — HEALTH STABILITY: MENTAL HEALTH: HOW OFTEN DO YOU HAVE 6 OR MORE DRINKS ON ONE OCCASION?: NEVER

## 2024-05-11 SDOH — HEALTH STABILITY: MENTAL HEALTH: SUICIDAL BEHAVIOR (LIFETIME): NO

## 2024-05-11 SDOH — SOCIAL STABILITY: SOCIAL INSECURITY: HAVE YOU HAD THOUGHTS OF HARMING ANYONE ELSE?: NO

## 2024-05-11 SDOH — HEALTH STABILITY: MENTAL HEALTH: HAVE YOU USED ANY SUBSTANCES (CANABIS, COCAINE, HEROIN, HALLUCINOGENS, INHALANTS, ETC.) IN THE PAST 12 MONTHS?: NO

## 2024-05-11 SDOH — HEALTH STABILITY: MENTAL HEALTH
DO YOU FEEL STRESS - TENSE, RESTLESS, NERVOUS, OR ANXIOUS, OR UNABLE TO SLEEP AT NIGHT BECAUSE YOUR MIND IS TROUBLED ALL THE TIME - THESE DAYS?: NOT AT ALL

## 2024-05-11 SDOH — HEALTH STABILITY: MENTAL HEALTH
HOW OFTEN DO YOU NEED TO HAVE SOMEONE HELP YOU WHEN YOU READ INSTRUCTIONS, PAMPHLETS, OR OTHER WRITTEN MATERIAL FROM YOUR DOCTOR OR PHARMACY?: NEVER

## 2024-05-11 SDOH — HEALTH STABILITY: MENTAL HEALTH: WERE YOU ABLE TO COMPLETE ALL THE BEHAVIORAL HEALTH SCREENINGS?: YES

## 2024-05-11 SDOH — HEALTH STABILITY: MENTAL HEALTH: HOW OFTEN DO YOU HAVE SIX OR MORE DRINKS ON ONE OCCASION?: NEVER

## 2024-05-11 SDOH — HEALTH STABILITY: MENTAL HEALTH: WISH TO BE DEAD (PAST 1 MONTH): NO

## 2024-05-11 SDOH — SOCIAL STABILITY: SOCIAL INSECURITY: ARE YOU MARRIED, WIDOWED, DIVORCED, SEPARATED, NEVER MARRIED, OR LIVING WITH A PARTNER?: DIVORCED

## 2024-05-11 SDOH — ECONOMIC STABILITY: FOOD INSECURITY: WITHIN THE PAST 12 MONTHS, YOU WORRIED THAT YOUR FOOD WOULD RUN OUT BEFORE YOU GOT MONEY TO BUY MORE.: NEVER TRUE

## 2024-05-11 SDOH — ECONOMIC STABILITY: TRANSPORTATION INSECURITY: IN THE PAST 12 MONTHS, HAS LACK OF TRANSPORTATION KEPT YOU FROM MEDICAL APPOINTMENTS OR FROM GETTING MEDICATIONS?: NO

## 2024-05-11 SDOH — SOCIAL STABILITY: SOCIAL INSECURITY: DO YOU FEEL ANYONE HAS EXPLOITED OR TAKEN ADVANTAGE OF YOU FINANCIALLY OR OF YOUR PERSONAL PROPERTY?: NO

## 2024-05-11 SDOH — HEALTH STABILITY: MENTAL HEALTH: HOW MANY STANDARD DRINKS CONTAINING ALCOHOL DO YOU HAVE ON A TYPICAL DAY?: PATIENT DOES NOT DRINK

## 2024-05-11 SDOH — ECONOMIC STABILITY: HOUSING INSECURITY: DO YOU FEEL UNSAFE GOING BACK TO THE PLACE WHERE YOU ARE LIVING?: NO

## 2024-05-11 SDOH — SOCIAL STABILITY: SOCIAL INSECURITY: ABUSE SCREEN: ADULT

## 2024-05-11 SDOH — HEALTH STABILITY: PHYSICAL HEALTH: ON AVERAGE, HOW MANY DAYS PER WEEK DO YOU ENGAGE IN MODERATE TO STRENUOUS EXERCISE (LIKE A BRISK WALK)?: 3 DAYS

## 2024-05-11 SDOH — SOCIAL STABILITY: SOCIAL NETWORK: ARE YOU MARRIED, WIDOWED, DIVORCED, SEPARATED, NEVER MARRIED, OR LIVING WITH A PARTNER?: DIVORCED

## 2024-05-11 SDOH — SOCIAL STABILITY: SOCIAL INSECURITY: PHYSICAL ABUSE: DENIES

## 2024-05-11 SDOH — HEALTH STABILITY: MENTAL HEALTH: HAVE YOU USED ANY PRESCRIPTION DRUGS OTHER THAN PRESCRIBED IN THE PAST 12 MONTHS?: NO

## 2024-05-11 SDOH — HEALTH STABILITY: MENTAL HEALTH: NON-SPECIFIC ACTIVE SUICIDAL THOUGHTS (PAST 1 MONTH): NO

## 2024-05-11 SDOH — SOCIAL STABILITY: SOCIAL INSECURITY: ARE YOU OR HAVE YOU BEEN THREATENED OR ABUSED PHYSICALLY, EMOTIONALLY, OR SEXUALLY BY ANYONE?: NO

## 2024-05-11 SDOH — ECONOMIC STABILITY: INCOME INSECURITY: HOW HARD IS IT FOR YOU TO PAY FOR THE VERY BASICS LIKE FOOD, HOUSING, MEDICAL CARE, AND HEATING?: NOT HARD AT ALL

## 2024-05-11 SDOH — HEALTH STABILITY: MENTAL HEALTH: STRENGTHS (MUST CHOOSE TWO): SENSE OF HUMOR;SUPPORT FROM FAMILY;SUPPORT FROM FRIENDS

## 2024-05-11 SDOH — ECONOMIC STABILITY: TRANSPORTATION INSECURITY
IN THE PAST 12 MONTHS, HAS LACK OF TRANSPORTATION KEPT YOU FROM MEETINGS, WORK, OR FROM GETTING THINGS NEEDED FOR DAILY LIVING?: NO

## 2024-05-11 ASSESSMENT — LIFESTYLE VARIABLES
HOW MANY STANDARD DRINKS CONTAINING ALCOHOL DO YOU HAVE ON A TYPICAL DAY: PATIENT DOES NOT DRINK
AUDIT-C TOTAL SCORE: 0
HOW MANY STANDARD DRINKS CONTAINING ALCOHOL DO YOU HAVE ON A TYPICAL DAY: PATIENT DOES NOT DRINK
SKIP TO QUESTIONS 9-10: 1
HOW OFTEN DO YOU HAVE 6 OR MORE DRINKS ON ONE OCCASION: NEVER
SKIP TO QUESTIONS 9-10: 1
AUDIT-C TOTAL SCORE: 0
AUDIT-C TOTAL SCORE: 0
HOW OFTEN DO YOU HAVE A DRINK CONTAINING ALCOHOL: NEVER
HOW OFTEN DO YOU HAVE A DRINK CONTAINING ALCOHOL: NEVER
SKIP TO QUESTIONS 9-10: 1
AUDIT-C TOTAL SCORE: 0
HOW OFTEN DO YOU HAVE 6 OR MORE DRINKS ON ONE OCCASION: NEVER
AUDIT-C TOTAL SCORE: 0

## 2024-05-11 ASSESSMENT — PAIN SCALES - GENERAL
PAINLEVEL_OUTOF10: 3
PAINLEVEL_OUTOF10: 5 - MODERATE PAIN
PAINLEVEL_OUTOF10: 3
PAINLEVEL_OUTOF10: 3
PAINLEVEL_OUTOF10: 1
PAINLEVEL_OUTOF10: 3
PAINLEVEL_OUTOF10: 3
PAINLEVEL_OUTOF10: 9
PAINLEVEL_OUTOF10: 5 - MODERATE PAIN

## 2024-05-11 ASSESSMENT — ACTIVITIES OF DAILY LIVING (ADL)
PATIENT'S MEMORY ADEQUATE TO SAFELY COMPLETE DAILY ACTIVITIES?: YES
BATHING: INDEPENDENT
FEEDING YOURSELF: INDEPENDENT
TOILETING: INDEPENDENT
DRESSING YOURSELF: INDEPENDENT
HEARING - LEFT EAR: FUNCTIONAL
GROOMING: INDEPENDENT
LACK_OF_TRANSPORTATION: NO
ADEQUATE_TO_COMPLETE_ADL: YES
LACK_OF_TRANSPORTATION: NO
JUDGMENT_ADEQUATE_SAFELY_COMPLETE_DAILY_ACTIVITIES: YES
LACK_OF_TRANSPORTATION: NO
WALKS IN HOME: INDEPENDENT
HEARING - RIGHT EAR: FUNCTIONAL

## 2024-05-11 ASSESSMENT — PATIENT HEALTH QUESTIONNAIRE - PHQ9
SUM OF ALL RESPONSES TO PHQ9 QUESTIONS 1 & 2: 0
1. LITTLE INTEREST OR PLEASURE IN DOING THINGS: NOT AT ALL
2. FEELING DOWN, DEPRESSED OR HOPELESS: NOT AT ALL
SUM OF ALL RESPONSES TO PHQ9 QUESTIONS 1 & 2: 0
1. LITTLE INTEREST OR PLEASURE IN DOING THINGS: NOT AT ALL
2. FEELING DOWN, DEPRESSED OR HOPELESS: NOT AT ALL

## 2024-05-11 ASSESSMENT — PAIN - FUNCTIONAL ASSESSMENT: PAIN_FUNCTIONAL_ASSESSMENT: 0-10

## 2024-05-11 NOTE — H&P
Obstetrical Admission History and Physical     Julisa Parnell is a 34 y.o.  at 37w4d. VIOLA: 2024, by Last Menstrual Period. Estimated fetal weight: 6lb. She has had prenatal care with Zee Salomon CNM .     Induction of labor due to NR- NST prolonged monitoring- minimal variability     Chief Complaint: pubic pain    Assessment/Plan    37w4d     Plan made with Dr. Moore to Induce vs. Continue prolonged monitoring. After 4 hours of prolonged monitoring decision was made to proceed with induction.      Patient scheduled for induction of labor 39w0d. Is very tearful and uncomfortable due to worsening pelvic pain. Discussed, risks of IOL <39wk with patient, benefits and alternatives. Patient wishes to do what is safest for patient.     Routine orders  Cefm  PCN per protocol  Reviewed limitations of induction based on fetal HR tracing    CRB inserted through cervical os and inflated with 60cc saline.   Placement of balloon confirmed with gentle traction.   Patient tolerated well. - minimal bleeding    Patient declines blood products- will accept fractionated products including  Platelets, FFP, Cell Saver, and Autologus transfusion, Albumin  has been counseled by physician in outpatient setting  Patients mom would like JW liaison contacted if emergent need for blood or  delivery arises.       Principal Problem:    Non-reactive NST (non-stress test)  Active Problems:    37 weeks gestation of pregnancy ([facility])      Pregnancy Problems (from 10/19/23 to present)       Problem Noted Resolved    Non-reactive NST (non-stress test) 2024 by JIM Medeiros No    Priority:  Medium      37 weeks gestation of pregnancy ([facility]) 2024 by JIM Medeiros No    Priority:  Medium      Positive GBS test 2024 by JIM Ayala No    Priority:  Medium      Refusal of blood product 3/20/2024 by JIM Ayala No    Priority:  Medium      Overview  Signed 5/2/2024  1:08 PM by Stephanie Ansari MD     5/2/24 Patient is a [employer]. 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 She is taking an iron supplement- will increase to twice daily. Eating an iron rich diet as well.  Discussed risk of bleeding with labor and delivery, including risk of death. Patient understands this. She is willing to except blood components and cell saver, as well as albumin. Has been researching the different options. Her mother is her power of - will bring documentation with her to hospital. Discussed that we would go over list of options on admission with her to clarify her wishes.          Bipolar disease during pregnancy in third trimester ([facility]) 10/14/2023 by Blessing Torres No    Priority:  Medium      Obesity in pregnancy ([facility]) 3/3/2023 by Lauren Landa, SAE No    Priority:  Medium      Bipolar disorder, most recent episode manic ([facility]) 9/10/2021 by Lauren Landa, SAE No    Priority:  Medium      Anxiety disorder, unspecified 9/10/2021 by Lauren Landa, SAE No    Priority:  Medium            Options for delivery have been discussed with the patient and she elects for an induction of labor.  Cervical ripening with cytotec, cervidil, other prostaglandin agents has been discussed.  Induction of labor with pitocin, amniotomy, cytotec, and cervical balloon have been discussed in detail. The risks, benefits, complications, alternatives, expected outcomes, potential problems during recuperation and recovery, and the risks of not performing the procedure were discussed with the patient. The patient stated understanding that the risks of delivery include, but are not limited to: death; reaction to medications; injury to bowel, bladder, ureters, uterus, cervix, vagina, and other pelvic and abdominal structures, infection; blood loss and possible need for transfusion; and potential need for surgery, including hysterectomy. The risks of injury to the infant during  delivery were also discussed. All questions were answered. There was concurrence with the planned procedure, and the patient wanted to proceed.    Admit to inpatient status. I anticipate that this patient will require a stay exceeding at least 2 midnights for delivery and postpartum.  Induction of labor.  Management of pregnancy complications, as indicated.    Subjective   Good fetal movement slightly decreased. Denies vaginal bleeding., Having contractions patient is not timing them. Denies leaking of fluid. + having severe symphysis pubis and suprapubic pain- constant patient tearful unable to walk well.    Reason for Induction of Labor:  Compromised fetal status with current fetal monitoring    Patient initially arrived 11:13 was put on the monitor 145 baseline, minimal variability no accelerations- decided to do prolonged monitoring at that point. Had some very small variable decelerations down to 130s <20 sec. Started having moderate variability with some accelerations 1600. Continues to fluctuate between moderate and minimal variability.         Obstetrical History   OB History    Para Term  AB Living   1         0   SAB IAB Ectopic Multiple Live Births                  # Outcome Date GA Lbr Andrea/2nd Weight Sex Delivery Anes PTL Lv   1 Current                Past Medical History  Past Medical History:   Diagnosis Date    Asthma ([facility])     Bipolar disorder, unspecified ([facility]) 06/15/2021    Bipolar and related disorder    Chronic frontal sinusitis 2019    Chronic frontal sinusitis    Encounter for fertility testing 2021    Fertility testing    Other specified joint disorders, unspecified knee 2017    Knee joint cyst, unspecified laterality    Other specified postprocedural states 2016    History of verrucae (wart) excision    Personal history of other diseases of the female genital tract 2014    History of dysmenorrhea    Personal history of other diseases of the  female genital tract 06/20/2014    History of irregular menstrual cycles    Personal history of other infectious and parasitic diseases 07/27/2016    History of viral warts    Personal history of other infectious and parasitic diseases 07/27/2016    History of verruca vulgaris    Personal history of other specified conditions 09/20/2017    History of insomnia    Viral wart, unspecified 07/27/2016    Verruca warts (infectious)        Past Surgical History   Past Surgical History:   Procedure Laterality Date    OTHER SURGICAL HISTORY  04/18/2019    No history of surgery       Social History  Social History     Tobacco Use    Smoking status: Never    Smokeless tobacco: Never   Substance Use Topics    Alcohol use: Not Currently     Substance and Sexual Activity   Drug Use Not Currently       Allergies  Patient has no known allergies.     Medications  Medications Prior to Admission   Medication Sig Dispense Refill Last Dose    aspirin 81 mg EC tablet Take 1 tablet (81 mg) by mouth once daily.       doxylamine (Unisom, doxylamine,) 25 mg tablet Take 1 tablet (25 mg) by mouth as needed at bedtime for sleep or nausea. 30 tablet 0     LORazepam (Ativan) 0.5 mg tablet Take 1 tablet (0.5 mg) by mouth 3 (three) times a week for 21 days. Take 1/2 to 1 tab po prn SEVERE ANXIETY for up to three times per week 10 tablet 0     lurasidone (Latuda) 40 mg tablet Take 1 tablet (40 mg) by mouth once daily. 90 tablet 1     lurasidone (Latuda) 60 mg tablet Take 1 tablet (60 mg) by mouth once daily. 30 tablet 2     prenatal vit 49-iron fum-folic (Mini Prenatal) 6.75 mg iron- 200 mcg tablet Take 1 tablet by mouth once daily. 90 tablet 0     prenatal vitamin, iron-folic, 27 mg iron-800 mcg folic acid tablet Take 1 tablet by mouth once daily. 90 tablet 3     sertraline (Zoloft) 50 mg tablet Take 1 tablet (50 mg) by mouth once daily. 90 tablet 1        Objective    Last Vitals  Temp Pulse Resp BP MAP O2 Sat   37.1 °C (98.8 °F) 94 18 111/75   98  %     Physical Examination  GENERAL: Examination reveals a well developed, well nourished, gravid female in no acute distress. She is alert and cooperative.  LUNGS: clear to auscultation bilaterally  HEART: regular rate and rhythm, S1, S2 normal, no murmur, click, rub or gallop  ABDOMEN: soft, gravid, nontender, nondistended, no abnormal masses, no epigastric pain  FHR is 149  , with minimal variability and accelerations  , and a 2  tracing.    Wh[place] reading: regular q2-5 min apart   The fetus is in a vertex presentation, determined by ultrasound  Current Estimated Fetal Weight 6lb established by Leopold's maneuver  VAGINA: normal appearing vagina with normal color and discharge and no lesions noted  CERVIX: Closed cm dilated, 30 % effaced, -2 station; MEMBRANES are Intact  EXTREMITIES: no redness or tenderness in the calves or thighs, no edema  PSYCHOLOGICAL: awake and alert; oriented to person, place, and time    Lab Review  Lab Results   Component Value Date    WBC 9.8 05/11/2024    HGB 11.4 (L) 05/11/2024    HCT 33.3 (L) 05/11/2024     05/11/2024

## 2024-05-11 NOTE — SIGNIFICANT EVENT
Upon review of  OB guideline of Blood Product Declination in the Obstetric Patient     Patient desires to have the option of cell saver in the event of  delivery. [facility] does not have cell saver capability. Patient agreeable to transfer to [facility] labor and delivery.     Radha ARIAS CNM    Patient seen in conjunction.  Patient is a [employer].  Blood informed consent form reviewed with patient.  At this point patient is willing to accept Cell Saver.  This is not available at [facility].  Advised patient she would have to be transferred.  Patient is agreeable

## 2024-05-12 ENCOUNTER — ANESTHESIA EVENT (OUTPATIENT)
Dept: OBSTETRICS AND GYNECOLOGY | Facility: HOSPITAL | Age: 34
End: 2024-05-12
Payer: MEDICAID

## 2024-05-12 ENCOUNTER — ANESTHESIA (OUTPATIENT)
Dept: OBSTETRICS AND GYNECOLOGY | Facility: HOSPITAL | Age: 34
End: 2024-05-12
Payer: MEDICAID

## 2024-05-12 LAB
ABO GROUP (TYPE) IN BLOOD: NORMAL
ANTIBODY SCREEN: NORMAL
RH FACTOR (ANTIGEN D): NORMAL
TREPONEMA PALLIDUM IGG+IGM AB [PRESENCE] IN SERUM OR PLASMA BY IMMUNOASSAY: NONREACTIVE

## 2024-05-12 PROCEDURE — 2500000004 HC RX 250 GENERAL PHARMACY W/ HCPCS (ALT 636 FOR OP/ED)

## 2024-05-12 PROCEDURE — 3E033VJ INTRODUCTION OF OTHER HORMONE INTO PERIPHERAL VEIN, PERCUTANEOUS APPROACH: ICD-10-PCS

## 2024-05-12 PROCEDURE — 7210000002 HC LABOR PER HOUR

## 2024-05-12 PROCEDURE — 3700000014 EPIDURAL BLOCK: Mod: GC | Performed by: STUDENT IN AN ORGANIZED HEALTH CARE EDUCATION/TRAINING PROGRAM

## 2024-05-12 PROCEDURE — 3700000002 HC GENERAL ANESTHESIA TIME - EACH INCREMENTAL 1 MINUTE: Performed by: ANESTHESIOLOGY

## 2024-05-12 PROCEDURE — 3700000001 HC GENERAL ANESTHESIA TIME - INITIAL BASE CHARGE: Performed by: ANESTHESIOLOGY

## 2024-05-12 PROCEDURE — 10907ZC DRAINAGE OF AMNIOTIC FLUID, THERAPEUTIC FROM PRODUCTS OF CONCEPTION, VIA NATURAL OR ARTIFICIAL OPENING: ICD-10-PCS | Performed by: NURSE PRACTITIONER

## 2024-05-12 PROCEDURE — 86901 BLOOD TYPING SEROLOGIC RH(D): CPT

## 2024-05-12 PROCEDURE — 86850 RBC ANTIBODY SCREEN: CPT

## 2024-05-12 PROCEDURE — 51701 INSERT BLADDER CATHETER: CPT

## 2024-05-12 PROCEDURE — 1120000001 HC OB PRIVATE ROOM DAILY

## 2024-05-12 PROCEDURE — 2500000005 HC RX 250 GENERAL PHARMACY W/O HCPCS: Performed by: STUDENT IN AN ORGANIZED HEALTH CARE EDUCATION/TRAINING PROGRAM

## 2024-05-12 PROCEDURE — 2500000001 HC RX 250 WO HCPCS SELF ADMINISTERED DRUGS (ALT 637 FOR MEDICARE OP)

## 2024-05-12 PROCEDURE — 36415 COLL VENOUS BLD VENIPUNCTURE: CPT

## 2024-05-12 PROCEDURE — 2500000006 HC RX 250 W HCPCS SELF ADMINISTERED DRUGS (ALT 637 FOR ALL PAYERS)

## 2024-05-12 RX ORDER — LIDOCAINE HCL/EPINEPHRINE/PF 2%-1:200K
VIAL (ML) INJECTION AS NEEDED
Status: DISCONTINUED | OUTPATIENT
Start: 2024-05-12 | End: 2024-05-13

## 2024-05-12 RX ORDER — SERTRALINE HYDROCHLORIDE 50 MG/1
50 TABLET, FILM COATED ORAL DAILY
Status: DISCONTINUED | OUTPATIENT
Start: 2024-05-12 | End: 2024-05-15 | Stop reason: HOSPADM

## 2024-05-12 RX ORDER — FENTANYL/BUPIVACAINE/NS/PF 2MCG/ML-.1
PLASTIC BAG, INJECTION (ML) INJECTION CONTINUOUS PRN
Status: DISCONTINUED | OUTPATIENT
Start: 2024-05-12 | End: 2024-05-13

## 2024-05-12 RX ORDER — PENICILLIN G 3000000 [IU]/50ML
3 INJECTION, SOLUTION INTRAVENOUS EVERY 4 HOURS
Status: DISCONTINUED | OUTPATIENT
Start: 2024-05-12 | End: 2024-05-13

## 2024-05-12 RX ORDER — OXYTOCIN/0.9 % SODIUM CHLORIDE 30/500 ML
2-30 PLASTIC BAG, INJECTION (ML) INTRAVENOUS CONTINUOUS
Status: DISCONTINUED | OUTPATIENT
Start: 2024-05-12 | End: 2024-05-13

## 2024-05-12 RX ORDER — FENTANYL/BUPIVACAINE/NS/PF 2MCG/ML-.1
PLASTIC BAG, INJECTION (ML) INJECTION AS NEEDED
Status: DISCONTINUED | OUTPATIENT
Start: 2024-05-12 | End: 2024-05-13

## 2024-05-12 RX ADMIN — FENTANYL CITRATE 5 ML: 50 INJECTION INTRAMUSCULAR; INTRAVENOUS at 03:46

## 2024-05-12 RX ADMIN — PENICILLIN G 3 MILLION UNITS: 3000000 INJECTION, SOLUTION INTRAVENOUS at 15:07

## 2024-05-12 RX ADMIN — Medication 2 MILLI-UNITS/MIN: at 06:04

## 2024-05-12 RX ADMIN — Medication 14 ML/HR: at 03:48

## 2024-05-12 RX ADMIN — PENICILLIN G 3 MILLION UNITS: 3000000 INJECTION, SOLUTION INTRAVENOUS at 03:49

## 2024-05-12 RX ADMIN — SERTRALINE HYDROCHLORIDE 50 MG: 50 TABLET ORAL at 04:41

## 2024-05-12 RX ADMIN — LIDOCAINE HYDROCHLORIDE,EPINEPHRINE BITARTRATE 5 ML: 20; .005 INJECTION, SOLUTION EPIDURAL; INFILTRATION; INTRACAUDAL; PERINEURAL at 18:43

## 2024-05-12 RX ADMIN — LURASIDONE HYDROCHLORIDE 60 MG: 20 TABLET, COATED ORAL at 04:41

## 2024-05-12 RX ADMIN — SODIUM CHLORIDE, POTASSIUM CHLORIDE, SODIUM LACTATE AND CALCIUM CHLORIDE 125 ML/HR: 600; 310; 30; 20 INJECTION, SOLUTION INTRAVENOUS at 21:56

## 2024-05-12 RX ADMIN — PENICILLIN G 3 MILLION UNITS: 3000000 INJECTION, SOLUTION INTRAVENOUS at 08:03

## 2024-05-12 RX ADMIN — SODIUM CHLORIDE, POTASSIUM CHLORIDE, SODIUM LACTATE AND CALCIUM CHLORIDE 125 ML/HR: 600; 310; 30; 20 INJECTION, SOLUTION INTRAVENOUS at 02:57

## 2024-05-12 RX ADMIN — LIDOCAINE HYDROCHLORIDE,EPINEPHRINE BITARTRATE 3 ML: 20; .005 INJECTION, SOLUTION EPIDURAL; INFILTRATION; INTRACAUDAL; PERINEURAL at 03:44

## 2024-05-12 RX ADMIN — Medication 14 ML/HR: at 18:50

## 2024-05-12 RX ADMIN — SODIUM CHLORIDE, POTASSIUM CHLORIDE, SODIUM LACTATE AND CALCIUM CHLORIDE 500 ML: 600; 310; 30; 20 INJECTION, SOLUTION INTRAVENOUS at 02:43

## 2024-05-12 RX ADMIN — FENTANYL CITRATE 5 ML: 50 INJECTION INTRAMUSCULAR; INTRAVENOUS at 03:47

## 2024-05-12 RX ADMIN — PENICILLIN G 3 MILLION UNITS: 3000000 INJECTION, SOLUTION INTRAVENOUS at 19:43

## 2024-05-12 SDOH — HEALTH STABILITY: MENTAL HEALTH: CURRENT SMOKER: 0

## 2024-05-12 ASSESSMENT — PAIN SCALES - GENERAL
PAINLEVEL_OUTOF10: 5 - MODERATE PAIN
PAINLEVEL_OUTOF10: 0 - NO PAIN
PAINLEVEL_OUTOF10: 9
PAINLEVEL_OUTOF10: 0 - NO PAIN
PAINLEVEL_OUTOF10: 5 - MODERATE PAIN
PAINLEVEL_OUTOF10: 3
PAINLEVEL_OUTOF10: 0 - NO PAIN
PAINLEVEL_OUTOF10: 2
PAINLEVEL_OUTOF10: 0 - NO PAIN
PAINLEVEL_OUTOF10: 3

## 2024-05-12 NOTE — H&P
Obstetrical Admission History and Physical     Julisa Parnell is a 34 y.o.  at 37w5d. VIOLA: 2024, by Last Menstrual Period. Estimated fetal weight: 6# . She has had prenatal care with Zee Salomon CNM  .    Assessment    Julisa Parnell is a 34 y.o.  at 37w5d. VIOLA: 2024, by Last Menstrual Period.   FHT Category 1  IOL   Declines blood products     Plan    Admit to labor and delivery  Monitor vital signs per unit protocol  Routine labs def - done at [facility]. T&S done at [facility] on admission   Encourage frequent position changes  Regular diet, clear liquid diet with epidural  Continuous fetal monitoring  Pain management per patient request  Continue assessment of maternal and fetal well-being  Recheck as clinically indicted by maternal or fetal status  Penicillin for GBS pos status (Started at [facility])  Anticipate SVB  Dr. Bruno aware of admission  Blood declination by MD     Will plan for cervical exam and come up with plan for continued IOL based off that once comfortable with epidural.     HUGO Echols-LIONEL    Subjective     Chief Complaint: No chief complaint on file.    Julisa is a admit from [facility] for IOL. She reports good fetal movement. Denies vaginal bleeding., Having contractions q 3-4 minutes.  , Denies leaking of fluid.        She was seen in triage today with c/o pelvic pain and FHT was nonreassuring. After prolonged monitoring the decision was made to proceed with IOL. While at [facility], she had a CRB in for appox 3 hour but it was deflated and removed d/t patient intolerance.     Patient declines blood products- will accept fractionated products including  Platelets, FFP, Cell Saver, and Autologus transfusion, Albumin  has been counseled by physician in outpatient setting    Pt is uncomfortable with contraptions and would like to sit for epidural soon.     Patient declines blood products- will accept fractionated products including  Platelets, FFP, Cell Saver, and Autologus  transfusion, Albumin  has been counseled by physician in outpatient setting  Patients mom would like JW liaison contacted if emergent need for blood or  delivery arises.       Pregnancy Problems (from 10/19/23 to present)       Problem Noted Resolved    Non-reactive NST (non-stress test) 2024 by JIM Medeiros No    Priority:  Medium      37 weeks gestation of pregnancy ([facility]) 2024 by JIM Medeiros No    Priority:  Medium      Encounter for induction of labor ([facility]) 2024 by JIM Echols No    Priority:  Medium      Positive GBS test 2024 by JIM Ayala No    Priority:  Medium      Refusal of blood product 3/20/2024 by JIM Aylaa No    Priority:  Medium      Overview Signed 2024  1:08 PM by Stephanie Ansari MD     24 Patient is a [employer]. 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 She is taking an iron supplement- will increase to twice daily. Eating an iron rich diet as well.  Discussed risk of bleeding with labor and delivery, including risk of death. Patient understands this. She is willing to except blood components and cell saver, as well as albumin. Has been researching the different options. Her mother is her power of - will bring documentation with her to hospital. Discussed that we would go over list of options on admission with her to clarify her wishes.          Bipolar disease during pregnancy in third trimester ([facility]) 10/14/2023 by Blessing Torres No    Priority:  Medium      Obesity in pregnancy ([facility]) 3/3/2023 by Lauren Landa, RN No    Priority:  Medium      Bipolar disorder, most recent episode manic ([facility]) 9/10/2021 by Lauren Landa, SAE No    Priority:  Medium      Anxiety disorder, unspecified 9/10/2021 by Lauren Landa, SAE No    Priority:  Medium               Obstetrical History   OB History    Para Term  AB Living   1         0   SAB IAB Ectopic  Multiple Live Births                  # Outcome Date GA Lbr Andrea/2nd Weight Sex Delivery Anes PTL Lv   1 Current                Past Medical History  Past Medical History:   Diagnosis Date    Asthma ([facility])     Bipolar disorder, unspecified ([facility]) 06/15/2021    Bipolar and related disorder    Chronic frontal sinusitis 05/14/2019    Chronic frontal sinusitis    Encounter for fertility testing 05/27/2021    Fertility testing    Other specified joint disorders, unspecified knee 09/20/2017    Knee joint cyst, unspecified laterality    Other specified postprocedural states 07/27/2016    History of verrucae (wart) excision    Personal history of other diseases of the female genital tract 06/20/2014    History of dysmenorrhea    Personal history of other diseases of the female genital tract 06/20/2014    History of irregular menstrual cycles    Personal history of other infectious and parasitic diseases 07/27/2016    History of viral warts    Personal history of other infectious and parasitic diseases 07/27/2016    History of verruca vulgaris    Personal history of other specified conditions 09/20/2017    History of insomnia    Viral wart, unspecified 07/27/2016    Verruca warts (infectious)        Past Surgical History   Past Surgical History:   Procedure Laterality Date    OTHER SURGICAL HISTORY  04/18/2019    No history of surgery       Social History  Social History     Tobacco Use    Smoking status: Never    Smokeless tobacco: Never   Substance Use Topics    Alcohol use: Not Currently     Substance and Sexual Activity   Drug Use Not Currently       Allergies  Patient has no known allergies.     Medications  Medications Prior to Admission   Medication Sig Dispense Refill Last Dose    aspirin 81 mg EC tablet Take 1 tablet (81 mg) by mouth once daily.       doxylamine (Unisom, doxylamine,) 25 mg tablet Take 1 tablet (25 mg) by mouth as needed at bedtime for sleep or nausea. 30 tablet 0     LORazepam (Ativan) 0.5 mg  tablet Take 1 tablet (0.5 mg) by mouth 3 (three) times a week for 21 days. Take 1/2 to 1 tab po prn SEVERE ANXIETY for up to three times per week 10 tablet 0     lurasidone (Latuda) 40 mg tablet Take 1 tablet (40 mg) by mouth once daily. 90 tablet 1     lurasidone (Latuda) 60 mg tablet Take 1 tablet (60 mg) by mouth once daily. 30 tablet 2     prenatal vit 49-iron fum-folic (Mini Prenatal) 6.75 mg iron- 200 mcg tablet Take 1 tablet by mouth once daily. 90 tablet 0     prenatal vitamin, iron-folic, 27 mg iron-800 mcg folic acid tablet Take 1 tablet by mouth once daily. 90 tablet 3     sertraline (Zoloft) 50 mg tablet Take 1 tablet (50 mg) by mouth once daily. 90 tablet 1        Objective    Last Vitals  Temp Pulse Resp BP MAP O2 Sat   36.5 °C (97.7 °F) 77 17 113/60   (!) 92 %     Physical Examination    GENERAL: Examination reveals a well developed, well nourished, gravid female in no acute distress and whose affect is appropriate. She is alert and cooperative.  HEENT: PERRLA. External ears normal. Nose normal, no erythema or discharge.  NECK: supple, no significant adenopathy  LUNGS:  normal resp effort  ABDOMEN: soft, gravid, nontender, nondistended, no abnormal masses, no epigastric pain, fundus soft, nontender, size equal dates, FHT present  FHR is  , with Accelerations, and a   tracing.    Ivins reading:    The fetus is in a vertex presentation, determined by ultrasound  VAGINA: normal appearing vagina with normal color and discharge and no lesions noted  CERVIX: def  EXTREMITIES: no redness or tenderness in the calves or thighs, no edema, no limitation in range of motion  SKIN: normal coloration and turgor, no rashes  NEUROLOGICAL: alert, oriented, normal speech, no focal findings or movement disorder noted  PSYCHOLOGICAL: awake and alert; oriented to person, place, and time    Fetal Monitoring      Baseline FHR: 150 per minute  Variability: moderate  Accelerations: yes  Decelerations:  1 questionable late at  start of strip then none noted   TOCO: irregular    Cervical Exam:  def until comfortable with epidural     Membrane status: intact      Lab Review  Labs in chart were reviewed.

## 2024-05-12 NOTE — PROGRESS NOTES
Intrapartum Progress Note  Assessment   Julisa Parnell is a 34 y.o.  at 37w5d. VIOLA: 2024, by Last Menstrual Period.   FHT Category 1  Latent labor  Declination of blood products, admission hgb = 11.4  GBS pos  Plan   Encourage frequent position changes as tolerated  Continue pitocin per protocol  PCN GBS prophylaxis  Will attempt AROM with next exam, fetal head ballotable at this time  Continue assessment of maternal and fetal wellbeing  Recheck as clinically indicated by maternal or fetal status  Anticipate active phase of labor  Ary Gan, JIM, APRN-CNP    Subjective   Patient comfortable with epidural, denies concerns at this time. CRB out at 0835.    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 She is taking an iron supplement- will increase to twice daily. Eating an iron rich diet as well.  Discussed risk of bleeding with labor and delivery, including risk of death. Patient understands this. She is willing to except blood components and cell saver, as well as albumin. Has been researching the different options. Her mother is her power of - will bring documentation with her to hospital. Discussed that we would go over list of options on admission with her to clarify her wishes.           Bipolar disease during pregnancy in third trimester ([facility]) 10/14/2023 by Blessing Torres No    Priority:  Medium      Obesity in pregnancy ([facility]) 3/3/2023 by Lauren Landa, SAE No    Priority:  Medium      Bipolar disorder, most recent episode manic ([facility]) 9/10/2021 by Lauren Landa, RN No    Priority:  Medium      Anxiety disorder, unspecified 9/10/2021 by Lauren Landa, SAE No    Priority:  Medium       Principal Problem:    Encounter for induction of labor ([facility])  Objective   Last Vitals:  Temp Pulse Resp BP MAP Pulse Ox   36.5 °C (97.7 °F) 87 18 127/72   97 %     Vitals Min/Max Last 24 Hours:  Temp  Min: 36.2 °C (97.2 °F)  Max: 37.1 °C (98.8 °F)  Pulse  Min: 69  Max: 103  Resp  Min: 16  Max: 18  BP  Min: 111/75  Max: 132/80    Physical Examination:  GENERAL: Examination reveals a well developed, well nourished, gravid female in no acute distress. She is alert and cooperative.  LUNGS:  normal respiratory effort  VAGINA: normal appearing vagina with normal color and discharge and no lesions noted  CERVIX:  4 cm dilated, 80 effaced, -3 station, ballotable; MEMBRANES are Intact  EXTREMITIES: no redness or tenderness in the calves or thighs, no edema  NEUROLOGICAL: alert, oriented, normal speech, no focal findings or movement disorder noted  PSYCHOLOGICAL: awake and alert; oriented to person, place, and time    Fetal Monitoring      Baseline FHR: 135 per minute  Variability: moderate  Accelerations: no  Decelerations: none  TOCO: regular, every 2-4 minutes    Lab Review:  Lab Results   Component Value Date    ABO O 05/12/2024    LABRH POS 05/12/2024    ABSCRN NEG 05/12/2024     Hemoglobin   Date Value Ref Range Status   05/11/2024 11.4 (L) 12.0 - 16.0 g/dL Final     Platelets   Date Value Ref Range Status   05/11/2024 291 150 - 450 x10*3/uL Final

## 2024-05-12 NOTE — PROGRESS NOTES
Assessment    34 y.o.  at 37w5d  FHT Category 1  Active labor  Declination of blood products, admission hgb = 11.4  GBS pos  Plan    Encourage frequent position changes as tolerated  Continue pitocin per protocol  PCN GBS prophylaxis  Continue assessment of maternal and fetal wellbeing  Recheck as clinically indicated by maternal or fetal status  Anticipate active phase of labor    Ary Gan, APRN-CNM, APRN-CNP    Subjective:  Julisa Parnell reports feeling increased pressure with contractions    Objective:  Fetal Monitoring      Baseline FHR: 135 per minute  Variability: moderate  Accelerations: yes  Decelerations: none  TOCO: regular, every 2-3 minutes    Cervical Exam:  6 cm dilated, 90 effaced, -2 station    Membrane status: ruptured    Pitocin is at 30 mu/min.    Vitals:    24 1438 24 1538 24 1632 24 1726   BP: 112/68 114/71 117/68 124/77   Pulse: 69 81 74 96   Resp: 17 18 18 18   Temp: 36.7 °C (98.1 °F) 36.8 °C (98.2 °F) 36.7 °C (98.1 °F) 36.8 °C (98.2 °F)   TempSrc: Temporal Temporal Temporal Temporal   SpO2: 99% 99% 98% 99%   Weight:       Height:

## 2024-05-12 NOTE — PROGRESS NOTES
Assessment    34 y.o.  at 37w5d  FHT Category 1  Latent labor  Declination of blood products, admission hgb = 11.4  GBS pos  Plan    Encourage frequent position changes as tolerated, patient repositioned in semi-fowlers  Continue pitocin per protocol  Continue assessment of maternal and fetal wellbeing  Recheck as clinically indicated by maternal or fetal status  Anticipate active phase of labor  Ary Gan, APRN-CNM, APRN-CNP    Subjective:  Julisa Parnell is continues to be comfortable with epidural.    Objective:  Fetal Monitoring      Baseline FHR: 140 per minute  Variability: moderate  Accelerations: yes  Decelerations: none  TOCO: regular, every 2-4 minutes    Cervical Exam: deferred    Membrane status: ruptured, AROM'd at 1117    Pitocin is at 20 mu/min.    Vitals:    24 1028 24 1124 24 1226 24 1227   BP: 118/63 123/86  112/59   Pulse: 85 90 78 76   Resp: 18 17  18   Temp:  36.8 °C (98.2 °F)  36.8 °C (98.2 °F)   TempSrc:  Temporal  Temporal   SpO2: 99% 99% 96% 97%   Weight:       Height:

## 2024-05-12 NOTE — ANESTHESIA PROCEDURE NOTES
Epidural Block    Patient location during procedure: floor  Start time: 5/12/2024 3:00 AM  End time: 5/12/2024 3:35 AM  Reason for block: labor analgesia  Staffing  Performed: attending and resident   Authorized by: Myrtle Landon MD    Performed by: Myrtle Landon MD    Preanesthetic Checklist  Completed: patient identified, IV checked, risks and benefits discussed, surgical consent, pre-op evaluation, timeout performed and sterile techniques followed  Block Timeout  RN/Licensed healthcare professional reads aloud to the Anesthesia provider and entire team: Patient identity, procedure with side and site, patient position, and as applicable the availability of implants/special equipment/special requirements.  Patient on coagulant treatment: no  Timeout performed at: 5/12/2024 3:00 AM  Block Placement  Patient position: sitting  Prep: ChloraPrep  Sterility prep: drape and gloves  Sedation level: no sedation  Patient monitoring: blood pressure and continuous pulse oximetry  Approach: midline  Local numbing: lidocaine 1% to skin and subcutaneous tissues  Vertebral space: lumbar  Lumbar location: L4-L5  Epidural  Loss of resistance technique: saline  Guidance: landmark technique        Needle  Needle type: Tuohy   Needle gauge: 17  Needle length: 10 cm  Needle insertion depth: 6.5 cm  Catheter type: multi-orifice  Catheter size: 18 G  Catheter at skin depth: 11.5 cm  Catheter securement method: clear occlusive dressing    Test dose: lidocaine 1.5% with epinephrine 1-to-200,000  Test dose: lidocaine 1.5% with epinephrine 1-to-200,000  Test dose result: no positive test dose    PCEA  Medication concentration used: 0.044% Bupivacaine with 1.25 mcg/mL Fentanyl and 1:614220 Epinephrine  Dose (mL): 200  Lockout (minutes): 15  1-Hour Limit (boluses/hr): 4  Basal Rate: 14        Assessment  Sensory level: T10 bilateral  Block outcome: pain improved  Number of attempts: 3 or more  Events: cerebrospinal fluid and no  positive test dose  Procedure assessment: patient sedated but conversant throughout procedure  Additional Notes  First attempt by resident at L4-5 with loss of resistance at 6.5cm but unable to thread catheter. Second and third attempts by attending at L3-4 with loss of resistance at 6.5cm able to thread catheter.

## 2024-05-12 NOTE — PROGRESS NOTES
Assessment    34 y.o.  at 37w5d  FHT Category 1  Latent labor  Latent labor  Declination of blood products, admission hgb = 11.4  GBS pos  Plan    Encourage frequent position changes as tolerated, patient repositioned to hands and knees  Continue pitocin per protocol  AROM for clear fluid  Continue assessment of maternal and fetal wellbeing  Recheck as clinically indicated by maternal or fetal status  Anticipate active phase of labor  Ary Gan, APRN-CNM, APRN-CNP    Subjective:  Julisa Parnell is continues to be comfortable with epidural.    Objective:  Fetal Monitoring      Baseline FHR: 140 per minute  Variability: moderate  Accelerations: yes  Decelerations: none  TOCO: regular, every 2-4 minutes    Cervical Exam:  5 cm dilated, 80 effaced, -3 station    Membrane status: ruptured, AROM'd for clear fluid    Pitocin is at 16 mu/min.    Vitals:    24 0832 24 0934 24 1028 24 1124   BP: 127/72 91/55 118/63 123/86   Pulse: 87 85 85 90   Resp: 17 17 18    Temp:  36.8 °C (98.2 °F)     TempSrc:  Temporal     SpO2: 97% 97% 99%    Weight:       Height:

## 2024-05-12 NOTE — PROGRESS NOTES
"Assessment    34 y.o.  at 37w5d  FHT Category 1  Latent labor  Declination of blood products, admission hgb = 11.4  GBS pos  Plan    Discussed with patient induction course thus far is WNL  Discussed expectation to transition to active labor ideally by 18 hours following AROM/pit, that she is 5 hours s/p AROM at this time. Reassurance and validation given to patient given IOL started at 1000 yesterday.   Dr. Lara to bedside to discuss risks/benefits of primary CS, with continued reassurance given for normal induction course at this time.   Patient verbalizes understanding and agreeable to continuing current plan of care at this time.   Encourage frequent position changes as tolerated  Continue pitocin per protocol  PCN GBS prophylaxis  Continue assessment of maternal and fetal wellbeing  Recheck as clinically indicated by maternal or fetal status  Anticipate active phase of labor    Ary Gan, APRN-CNM, APRN-CNP    Subjective:  Julisa Parnell continues to be comfortable with epidural, endorses feeling abdominal \"pressure\" with contractions, denies rectal pressure. Patient endorses fatigue and verbalizes frustration with the length of time her induction has taken so far. She is requesting discussion re: alternative method of delivery.     Objective:  Fetal Monitoring      Baseline FHR: 135 per minute  Variability: moderate  Accelerations: yes  Decelerations: none  TOCO: regular, every 2-3 minutes    Cervical Exam:  5 cm dilated, 80 effaced, -2 station    Membrane status: ruptured    Pitocin is at 26 mu/min.    Vitals:    24 1227 24 1339 24 1438 24 1538   BP: 112/59 109/67 112/68 114/71   Pulse: 76 70 69 81   Resp: 18 18 17 18   Temp: 36.8 °C (98.2 °F) 36.8 °C (98.2 °F) 36.7 °C (98.1 °F) 36.8 °C (98.2 °F)   TempSrc: Temporal Temporal Temporal Temporal   SpO2: 97% 99% 99% 99%   Weight:       Height:          "

## 2024-05-12 NOTE — ANESTHESIA PREPROCEDURE EVALUATION
Patient: Julisa Parnell    Evaluation Method: In-person visit    Procedure Information    Anesthesia Start Date/Time: 05/12/24 0300  Procedure: Labor Analgesia         Relevant Problems   Anesthesia  Never had anesthesia   (-) Malignant hyperthermia      Cardiac   (-) CAD (coronary artery disease)   (-) CHF (congestive heart failure) ([facility])   (-) Chest pain   (-) HTN (hypertension)   (-) Pacemaker      Pulmonary   (+) Asthmatic bronchitis ([facility])   (+) Asthmatic bronchitis, severe persistent, uncomplicated ([facility])   (-) Chronic obstructive pulmonary disease ([facility])   (-) SEBLE (obstructive sleep apnea)   (-) Recent URI      Neuro   (+) Anxiety   (+) Anxiety disorder, unspecified   (+) Bipolar disease during pregnancy in third trimester ([facility])   (+) Bipolar disorder, most recent episode manic ([facility])   (+) Cervical radiculopathy, acute   (+) Depression with anxiety   (-) CVA (cerebral vascular accident) ([facility])   (-) History of migraine headaches   (-) Seizures ([facility])      GI   (-) Gastroesophageal reflux disease      /Renal   (-) Acute kidney injury (nontraumatic) ([facility])      Liver (within normal limits)      Endocrine   (+) Obesity in pregnancy ([facility])      Hematology   (-) DVT (deep venous thrombosis) ([facility])      Musculoskeletal   (+) Cervical myofascial pain syndrome   (+) Myofascial pain syndrome of lumbar spine      HEENT   (+) Chronic maxillary sinusitis      ID (within normal limits)      GYN   (+) 21 weeks gestation of pregnancy ([facility])   (+) 37 weeks gestation of pregnancy ([facility])       Clinical information reviewed:   Tobacco     Med Hx  Surg Hx   Fam Hx          NPO Detail:  No data recorded     OB/GYN     Physical Exam    Airway  Mallampati: III  TM distance: >3 FB  Neck ROM: full     Cardiovascular   Rhythm: regular  Rate: normal     Dental    Pulmonary - normal exam     Abdominal            Anesthesia Plan    History of general anesthesia?: no  History of complications of  general anesthesia?: no    ASA 3     epidural     The patient is not a current smoker.    Anesthetic plan and risks discussed with patient.  Use of blood products discussed with patient who consented to blood products.    Plan discussed with resident.

## 2024-05-12 NOTE — PROGRESS NOTES
Assessment    34 y.o.  at 37w5d  FHT Category 1  IOL  GBS positive   Declines blood products     Plan      Encourage frequent position changes as tolerated  Maternal repositioning  Start/Continue pitocin per protocol  Pain management per patient request  PCN GBS prophylaxis  Continue assessment of maternal and fetal wellbeing  Recheck as clinically indicated by maternal or fetal status  Anticipate active phase of labor  Anticipate NSVB    Will plan to start pit with CRB now vs starting cyto since she is already ashly.     Shefali Osei, APRN-CNM    Subjective:  Julisa Parnell is resting in bed, comfortable with epidural. She is agreeable to exam at this time.     Objective:  Fetal Monitoring      Baseline FHR: 130 per minute  Variability: moderate  Accelerations: no  Decelerations: none  TOCO: irregular    Cervical Exam:  1 cm dilated, 50 effaced, -3 station    Membrane status: intact    CRB inserted through cervical os and inflated with 60cc saline.   Placement of balloon confirmed with gentle traction.   Patient tolerated well.      Vitals:    24 0356 24 0419 24 0427 24 0530   BP:  117/72  113/60   Pulse: 86 75 69 77   Resp:  18  17   Temp:  36.6 °C (97.9 °F)  36.5 °C (97.7 °F)   TempSrc:  Temporal  Temporal   SpO2: 100% 100% (!) 92%    Weight:       Height:

## 2024-05-13 LAB
ALBUMIN SERPL BCP-MCNC: 3.3 G/DL (ref 3.4–5)
ALP SERPL-CCNC: 101 U/L (ref 33–110)
ALT SERPL W P-5'-P-CCNC: 16 U/L (ref 7–45)
ANION GAP SERPL CALC-SCNC: 15 MMOL/L (ref 10–20)
AST SERPL W P-5'-P-CCNC: 28 U/L (ref 9–39)
BILIRUB SERPL-MCNC: 0.5 MG/DL (ref 0–1.2)
BUN SERPL-MCNC: 7 MG/DL (ref 6–23)
CALCIUM SERPL-MCNC: 8.9 MG/DL (ref 8.6–10.6)
CHLORIDE SERPL-SCNC: 102 MMOL/L (ref 98–107)
CO2 SERPL-SCNC: 20 MMOL/L (ref 21–32)
CREAT SERPL-MCNC: 0.81 MG/DL (ref 0.5–1.05)
EGFRCR SERPLBLD CKD-EPI 2021: >90 ML/MIN/1.73M*2
ERYTHROCYTE [DISTWIDTH] IN BLOOD BY AUTOMATED COUNT: 13.8 % (ref 11.5–14.5)
GLUCOSE SERPL-MCNC: 130 MG/DL (ref 74–99)
HCT VFR BLD AUTO: 31.4 % (ref 36–46)
HGB BLD-MCNC: 10.1 G/DL (ref 12–16)
MCH RBC QN AUTO: 29.3 PG (ref 26–34)
MCHC RBC AUTO-ENTMCNC: 32.2 G/DL (ref 32–36)
MCV RBC AUTO: 91 FL (ref 80–100)
NRBC BLD-RTO: 0 /100 WBCS (ref 0–0)
PLATELET # BLD AUTO: 250 X10*3/UL (ref 150–450)
POTASSIUM SERPL-SCNC: 4.2 MMOL/L (ref 3.5–5.3)
PROT SERPL-MCNC: 5.7 G/DL (ref 6.4–8.2)
RBC # BLD AUTO: 3.45 X10*6/UL (ref 4–5.2)
SODIUM SERPL-SCNC: 133 MMOL/L (ref 136–145)
WBC # BLD AUTO: 20.4 X10*3/UL (ref 4.4–11.3)

## 2024-05-13 PROCEDURE — 2500000001 HC RX 250 WO HCPCS SELF ADMINISTERED DRUGS (ALT 637 FOR MEDICARE OP)

## 2024-05-13 PROCEDURE — 2500000005 HC RX 250 GENERAL PHARMACY W/O HCPCS

## 2024-05-13 PROCEDURE — 2500000004 HC RX 250 GENERAL PHARMACY W/ HCPCS (ALT 636 FOR OP/ED)

## 2024-05-13 PROCEDURE — 59409 OBSTETRICAL CARE: CPT

## 2024-05-13 PROCEDURE — 1210000001 HC SEMI-PRIVATE ROOM DAILY

## 2024-05-13 PROCEDURE — 2500000006 HC RX 250 W HCPCS SELF ADMINISTERED DRUGS (ALT 637 FOR ALL PAYERS)

## 2024-05-13 PROCEDURE — 85027 COMPLETE CBC AUTOMATED: CPT

## 2024-05-13 PROCEDURE — 7100000016 HC LABOR RECOVERY PER HOUR

## 2024-05-13 PROCEDURE — 0HQ9XZZ REPAIR PERINEUM SKIN, EXTERNAL APPROACH: ICD-10-PCS

## 2024-05-13 PROCEDURE — 80053 COMPREHEN METABOLIC PANEL: CPT

## 2024-05-13 PROCEDURE — 0UQMXZZ REPAIR VULVA, EXTERNAL APPROACH: ICD-10-PCS

## 2024-05-13 RX ORDER — ADHESIVE BANDAGE
10 BANDAGE TOPICAL
Status: DISCONTINUED | OUTPATIENT
Start: 2024-05-13 | End: 2024-05-15 | Stop reason: HOSPADM

## 2024-05-13 RX ORDER — BISACODYL 10 MG/1
10 SUPPOSITORY RECTAL DAILY PRN
Status: DISCONTINUED | OUTPATIENT
Start: 2024-05-13 | End: 2024-05-15 | Stop reason: HOSPADM

## 2024-05-13 RX ORDER — ENOXAPARIN SODIUM 100 MG/ML
40 INJECTION SUBCUTANEOUS EVERY 24 HOURS
Status: DISCONTINUED | OUTPATIENT
Start: 2024-05-13 | End: 2024-05-13

## 2024-05-13 RX ORDER — NIFEDIPINE 10 MG/1
10 CAPSULE ORAL ONCE AS NEEDED
Status: DISCONTINUED | OUTPATIENT
Start: 2024-05-13 | End: 2024-05-15 | Stop reason: HOSPADM

## 2024-05-13 RX ORDER — OXYTOCIN 10 [USP'U]/ML
10 INJECTION, SOLUTION INTRAMUSCULAR; INTRAVENOUS ONCE AS NEEDED
Status: DISCONTINUED | OUTPATIENT
Start: 2024-05-13 | End: 2024-05-15 | Stop reason: HOSPADM

## 2024-05-13 RX ORDER — HYDRALAZINE HYDROCHLORIDE 20 MG/ML
5 INJECTION INTRAMUSCULAR; INTRAVENOUS ONCE AS NEEDED
Status: DISCONTINUED | OUTPATIENT
Start: 2024-05-13 | End: 2024-05-15 | Stop reason: HOSPADM

## 2024-05-13 RX ORDER — ENOXAPARIN SODIUM 100 MG/ML
40 INJECTION SUBCUTANEOUS EVERY 24 HOURS
Status: DISCONTINUED | OUTPATIENT
Start: 2024-05-13 | End: 2024-05-15 | Stop reason: HOSPADM

## 2024-05-13 RX ORDER — SIMETHICONE 80 MG
80 TABLET,CHEWABLE ORAL 4 TIMES DAILY PRN
Status: DISCONTINUED | OUTPATIENT
Start: 2024-05-13 | End: 2024-05-15 | Stop reason: HOSPADM

## 2024-05-13 RX ORDER — IBUPROFEN 600 MG/1
600 TABLET ORAL EVERY 6 HOURS
Status: DISCONTINUED | OUTPATIENT
Start: 2024-05-13 | End: 2024-05-15 | Stop reason: HOSPADM

## 2024-05-13 RX ORDER — ONDANSETRON HYDROCHLORIDE 2 MG/ML
4 INJECTION, SOLUTION INTRAVENOUS EVERY 6 HOURS PRN
Status: DISCONTINUED | OUTPATIENT
Start: 2024-05-13 | End: 2024-05-15 | Stop reason: HOSPADM

## 2024-05-13 RX ORDER — MISOPROSTOL 200 UG/1
800 TABLET ORAL ONCE AS NEEDED
Status: DISCONTINUED | OUTPATIENT
Start: 2024-05-13 | End: 2024-05-15 | Stop reason: HOSPADM

## 2024-05-13 RX ORDER — ACETAMINOPHEN 325 MG/1
975 TABLET ORAL EVERY 6 HOURS
Status: DISCONTINUED | OUTPATIENT
Start: 2024-05-13 | End: 2024-05-15 | Stop reason: HOSPADM

## 2024-05-13 RX ORDER — TRANEXAMIC ACID 100 MG/ML
1000 INJECTION, SOLUTION INTRAVENOUS ONCE AS NEEDED
Status: DISCONTINUED | OUTPATIENT
Start: 2024-05-13 | End: 2024-05-15 | Stop reason: HOSPADM

## 2024-05-13 RX ORDER — MISOPROSTOL 200 UG/1
TABLET ORAL
Status: COMPLETED
Start: 2024-05-13 | End: 2024-05-13

## 2024-05-13 RX ORDER — LABETALOL HYDROCHLORIDE 5 MG/ML
20 INJECTION, SOLUTION INTRAVENOUS ONCE AS NEEDED
Status: DISCONTINUED | OUTPATIENT
Start: 2024-05-13 | End: 2024-05-15 | Stop reason: HOSPADM

## 2024-05-13 RX ORDER — ONDANSETRON 4 MG/1
4 TABLET, FILM COATED ORAL EVERY 6 HOURS PRN
Status: DISCONTINUED | OUTPATIENT
Start: 2024-05-13 | End: 2024-05-15 | Stop reason: HOSPADM

## 2024-05-13 RX ORDER — LIDOCAINE 560 MG/1
1 PATCH PERCUTANEOUS; TOPICAL; TRANSDERMAL
Status: DISCONTINUED | OUTPATIENT
Start: 2024-05-13 | End: 2024-05-15 | Stop reason: HOSPADM

## 2024-05-13 RX ORDER — OXYTOCIN/0.9 % SODIUM CHLORIDE 30/500 ML
60 PLASTIC BAG, INJECTION (ML) INTRAVENOUS ONCE AS NEEDED
Status: DISCONTINUED | OUTPATIENT
Start: 2024-05-13 | End: 2024-05-15 | Stop reason: HOSPADM

## 2024-05-13 RX ORDER — CARBOPROST TROMETHAMINE 250 UG/ML
250 INJECTION, SOLUTION INTRAMUSCULAR ONCE AS NEEDED
Status: DISCONTINUED | OUTPATIENT
Start: 2024-05-13 | End: 2024-05-15 | Stop reason: HOSPADM

## 2024-05-13 RX ORDER — METHYLERGONOVINE MALEATE 0.2 MG/ML
0.2 INJECTION INTRAVENOUS ONCE AS NEEDED
Status: DISCONTINUED | OUTPATIENT
Start: 2024-05-13 | End: 2024-05-15 | Stop reason: HOSPADM

## 2024-05-13 RX ORDER — POLYETHYLENE GLYCOL 3350 17 G/17G
17 POWDER, FOR SOLUTION ORAL 2 TIMES DAILY PRN
Status: DISCONTINUED | OUTPATIENT
Start: 2024-05-13 | End: 2024-05-15 | Stop reason: HOSPADM

## 2024-05-13 RX ORDER — DIPHENHYDRAMINE HYDROCHLORIDE 50 MG/ML
25 INJECTION INTRAMUSCULAR; INTRAVENOUS EVERY 6 HOURS PRN
Status: DISCONTINUED | OUTPATIENT
Start: 2024-05-13 | End: 2024-05-15 | Stop reason: HOSPADM

## 2024-05-13 RX ORDER — LOPERAMIDE HYDROCHLORIDE 2 MG/1
4 CAPSULE ORAL EVERY 2 HOUR PRN
Status: DISCONTINUED | OUTPATIENT
Start: 2024-05-13 | End: 2024-05-15 | Stop reason: HOSPADM

## 2024-05-13 RX ORDER — DIPHENHYDRAMINE HCL 25 MG
25 CAPSULE ORAL EVERY 6 HOURS PRN
Status: DISCONTINUED | OUTPATIENT
Start: 2024-05-13 | End: 2024-05-15 | Stop reason: HOSPADM

## 2024-05-13 RX ADMIN — LURASIDONE HYDROCHLORIDE 60 MG: 20 TABLET, COATED ORAL at 22:31

## 2024-05-13 RX ADMIN — Medication 1 EACH: at 06:51

## 2024-05-13 RX ADMIN — IBUPROFEN 600 MG: 600 TABLET ORAL at 20:50

## 2024-05-13 RX ADMIN — SERTRALINE HYDROCHLORIDE 50 MG: 50 TABLET ORAL at 22:31

## 2024-05-13 RX ADMIN — ENOXAPARIN SODIUM 40 MG: 100 INJECTION SUBCUTANEOUS at 14:22

## 2024-05-13 RX ADMIN — IBUPROFEN 600 MG: 600 TABLET ORAL at 14:22

## 2024-05-13 RX ADMIN — MISOPROSTOL 800 MCG: 200 TABLET ORAL at 00:37

## 2024-05-13 RX ADMIN — BENZOCAINE AND LEVOMENTHOL 1 APPLICATION: 200; 5 SPRAY TOPICAL at 06:51

## 2024-05-13 RX ADMIN — ACETAMINOPHEN 975 MG: 325 TABLET ORAL at 03:18

## 2024-05-13 RX ADMIN — IBUPROFEN 600 MG: 600 TABLET ORAL at 03:17

## 2024-05-13 ASSESSMENT — PAIN SCALES - GENERAL
PAINLEVEL_OUTOF10: 6
PAINLEVEL_OUTOF10: 0 - NO PAIN
PAINLEVEL_OUTOF10: 0 - NO PAIN
PAINLEVEL_OUTOF10: 6
PAINLEVEL_OUTOF10: 0 - NO PAIN

## 2024-05-13 ASSESSMENT — PAIN DESCRIPTION - LOCATION: LOCATION: BACK

## 2024-05-13 ASSESSMENT — PAIN - FUNCTIONAL ASSESSMENT
PAIN_FUNCTIONAL_ASSESSMENT: 0-10
PAIN_FUNCTIONAL_ASSESSMENT: 0-10

## 2024-05-13 ASSESSMENT — PAIN DESCRIPTION - DESCRIPTORS: DESCRIPTORS: SORE

## 2024-05-13 NOTE — SIGNIFICANT EVENT
Pt had 3 mild range blood pressures in recovery (earliest one at 0116) not yet > 4 hours apart. She denies concerning s/s. HELLP labs ordered. Will continue to monitor    JIM Echols

## 2024-05-13 NOTE — PROGRESS NOTES
Assessment    34 y.o.  at 37w6d  FHT Category 1  Active labor  GBS positive   Declination of blood products, admission hgb = 11.4   Plan      Encourage frequent position changes as tolerated  Maternal repositioning  Start/Continue pitocin per protocol  Pain management per patient request  PCN GBS prophylaxis  Continue assessment of maternal and fetal wellbeing  Recheck as clinically indicated by maternal or fetal status  Anticipate second stage of labor  Anticipate NSVB    Shefali Osei, APRN-CNM    Subjective:  Julisa Parnell is resting in bed, feeling more rectal pressure. Coping fair, with epidural.     Objective:  Fetal Monitoring      Baseline FHR: 140 per minute  Variability: moderate  Accelerations: no  Decelerations: none  TOCO: regular, every 2-4 minutes    Cervical Exam:  7 cm dilated, 90 effaced, -1 station    Membrane status: ruptured, clear fluid    Pitocin is at 30 mu/min.    Vitals:    24 0053 24 0054 24 0059 24 0104   BP:   112/62    Pulse: 94 93 94 100   Resp:       Temp:       TempSrc:       SpO2: (!) 88% 99% 100% 100%   Weight:       Height:

## 2024-05-13 NOTE — LACTATION NOTE
Lactation Consultant Note  Lactation Consultation  Reason for Consult: Initial assessment  Consultant Name: Whitley Alexandre RN, IBCLC    Maternal Information  Has mother  before?: No  Infant to breast within first 2 hours of birth?: Yes    Maternal Assessment  Breast Assessment: Medium, Soft, Warm, Compressible  Nipple Assessment: Intact, Erect  Areola Assessment: Normal    Infant Assessment  Infant Behavior: Deep sleep    Feeding Assessment  Nutrition Source: Breastmilk  Feeding Position: Cross - cradle, C - hold, Skin to skin, Nipple to nose, Mother needs assistance with latch/positioning  Suck/Feeding: Other (Comment) (infant did not latch; too sleepy, not showing feeding cues)  Latch Assessment: No latch achieved, Too sleepy, Unable to arouse for feeding    LATCH TOOL  Latch: Too sleepy or reluctant, no latch achieved  Audible Swallowing: None  Type of Nipple: Everted (After stimulation)  Comfort (Breast/Nipple): Soft/non-tender  Hold (Positioning): Minimal assist, teach one side, mother does other, staff holds  LATCH Score: 5    Breast Pump  Pump: Hand expression  Units of Volume: Drops    Other OB Lactation Tools  Lactation Tools: Other (Comment) (mom has own nipple cream)    Patient Follow-up       Other OB Lactation Documentation  Maternal Risk Factors: Age over 30, primiparity, Obesity (pre-pregnancy BMI >30)  Infant Risk Factors: Early term birth 37-39 weeks    Recommendations/Summary  Upon entering mom's room, infant in deep sleep in bassinet. Mom agreeable to attempt latch at this time. Un swaddled infant, attempted suck assessment, but after a couple sucks, infant proceeded to go back to sleep. Placed infant skin to skin with mom, infant did not show any feeding cues at this time (infant 8 hours old). Practiced positioning infant in cross cradle hold tummy to tummy with head in the sniffing position, but infant did not show any interest in latching at this time. Showed mom hand expression  (with mom's consent) of drops of colostrum - mom's breast is expressible and compressible and showed mom drops of colostrum produced. Educated mom on normal infant behavior in the first 24 hours of life, colostrum production, importance of skin to skin, infant feeding cues, and goal of feeding 8-12 times in a 24 hour period. Educated on waking infant and attempting feed skin to skin if it has been 3 hours, latching on first breast, burping, and then attempting second breast.    Outpatient resources given to mom. Mom not interested in [facility] program at this time. Ordered Spectra pump for mom through [employer].

## 2024-05-13 NOTE — PROGRESS NOTES
Intrapartum Progress Note    Assessment   Julisa Parnell is a 34 y.o.  at 37w5d. VIOLA: 2024, by Last Menstrual Period.   FHT Category 2  Active labor  GBS positive  Declination of blood products, admission hgb = 11.4     Plan   Encourage frequent position changes as tolerated  Maternal repositioning  Start/Continue pitocin per protocol  Pain management per patient request  PCN GBS prophylaxis  Continue assessment of maternal and fetal wellbeing  Recheck as clinically indicated by maternal or fetal status  Anticipate second stage of labor  Anticipate NSVB    JIM Echols    Subjective   Pt is resting in bed, support family at bedside. She is coping well with epidural but feeling more pressure. No concerns at  this time. Pit is infusing at 30mu/min. Reviewed goals for the shift.     Pregnancy complicated by:  Pregnancy Problems (from 10/19/23 to present)       Problem Noted Resolved    Non-reactive NST (non-stress test) 2024 by JIM Medeiros No    Priority:  Medium      37 weeks gestation of pregnancy ([facility]) 2024 by JIM Medeiros No    Priority:  Medium      Encounter for induction of labor ([facility]) 2024 by JIM Echols No    Priority:  Medium      Positive GBS test 2024 by JIM Ayala No    Priority:  Medium      Refusal of blood product 3/20/2024 by JIM Ayala No    Priority:  Medium      Overview Signed 2024  1:08 PM by Stephanie Ansari MD     24 Patient is a [employer]. 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 She is taking an iron supplement- will increase to twice daily. Eating an iron rich diet as well.  Discussed risk of bleeding with labor and delivery, including risk of death. Patient understands this. She is willing to except blood components and cell saver, as well as albumin. Has been researching the different options. Her mother is her power of - will bring  documentation with her to hospital. Discussed that we would go over list of options on admission with her to clarify her wishes.          Bipolar disease during pregnancy in third trimester ([facility]) 10/14/2023 by Blessing Torres No    Priority:  Medium      Obesity in pregnancy ([facility]) 3/3/2023 by Lauren Landa, RN No    Priority:  Medium      Bipolar disorder, most recent episode manic ([facility]) 9/10/2021 by Lauren Landa, RN No    Priority:  Medium      Anxiety disorder, unspecified 9/10/2021 by Lauren Landa, SAE No    Priority:  Medium            Principal Problem:    Encounter for induction of labor ([facility])      Objective   Last Vitals:  Temp Pulse Resp BP MAP Pulse Ox   36.3 °C (97.3 °F) 80 19 117/74   100 %     Vitals Min/Max Last 24 Hours:  Temp  Min: 36.3 °C (97.3 °F)  Max: 37 °C (98.6 °F)  Pulse  Min: 69  Max: 103  Resp  Min: 17  Max: 19  BP  Min: 91/55  Max: 132/80    Intake/Output:    Intake/Output Summary (Last 24 hours) at 5/12/2024 2024  Last data filed at 5/12/2024 1720  Gross per 24 hour   Intake --   Output 1600 ml   Net -1600 ml       Physical Examination:  GENERAL: Examination reveals a well developed, well nourished, gravid female in no acute distress and whose affect is appropriate. She is alert and cooperative.  HEENT: PERRLA. External ears normal. Nose normal, no erythema or discharge.  NECK: supple, no significant adenopathy  LUNGS:  normal resp effort   ABDOMEN: soft, gravid, nontender, nondistended, no abnormal masses, no epigastric pain, fundus soft, nontender, size equal dates, FHT present  VAGINA: normal appearing vagina with normal color and discharge and no lesions noted  CERVIX:  def at this time ; MEMBRANES are  (leaking)  EXTREMITIES: no redness or tenderness in the calves or thighs, no edema, no limitation in range of motion  SKIN: normal coloration and turgor, no rashes  NEUROLOGICAL: alert, oriented, normal speech, no focal findings or movement disorder noted  PSYCHOLOGICAL:  awake and alert; oriented to person, place, and time    Fetal Monitoring      Baseline FHR: 140 per minute  Variability: moderate  Accelerations: no  Decelerations: variable and late - intermittent   TOCO: regular, every 1-3 minutes    Lab Review:  Labs in chart were reviewed.

## 2024-05-13 NOTE — CARE PLAN
The patient's goals for the shift include Have a safe delivery    The clinical goals for the shift include FHR remains reassuring and progression in cervical exam

## 2024-05-13 NOTE — DISCHARGE INSTRUCTIONS

## 2024-05-13 NOTE — ANESTHESIA POSTPROCEDURE EVALUATION
Patient: Julisa Parnell    Procedure Summary       Date: 05/12/24 Room / Location:     Anesthesia Start: 0300 Anesthesia Stop: 05/13/24 0022    Procedure: Labor Analgesia Diagnosis:     Scheduled Providers:  Responsible Provider: Remi Bhakta MD PhD    Anesthesia Type: epidural ASA Status: 3            Anesthesia Type: epidural    Vitals:    05/13/24 0520   BP: 110/74   Pulse: 82   Resp: 16   Temp: 36.6 °C (97.9 °F)   SpO2: 95%           Anesthesia Post Evaluation    Patient location during evaluation: floor  Patient participation: complete - patient participated  Level of consciousness: awake  Pain management: adequate  Airway patency: patent  Cardiovascular status: acceptable  Respiratory status: acceptable  Hydration status: acceptable  Postoperative Nausea and Vomiting: none  Comments: Neuraxial site assessed. No visible redness or swelling or drainage. Patient able to ambulate and move all extremities without difficulty. Able to void. No complaints of nausea/vomiting. Tolerating PO intake well. No s/sx of PDPH.          No notable events documented.

## 2024-05-13 NOTE — L&D DELIVERY NOTE
OB Delivery Note  2024  Julisa Mark Janeth Parmjit  34 y.o.   Vaginal, Spontaneous        Gestational Age: 37w6d  /Para:   Quantitative Blood Loss: Admission to Discharge: 300 mL (2024 10:59 PM - 2024  2:25 AM)    Parmjit Harshad [[id number]]      Labor Events    Rupture date/time: 2024 1117  Rupture type: Artificial  Fluid color: Clear  Fluid odor: None  Labor type: Induced Onset of Labor  Labor allowed to proceed with plans for an attempted vaginal birth?: Yes  Induction: Misoprostol, Oxytocin  Complications: None       Labor Event Times    Labor onset date/time: 2024  Dilation complete date/time: 2024  Start pushing date/time: 2024       Labor Length    1st stage: 47h 51m  3rd stage: 0h 03m       Placenta    Placenta delivery date/time: 2024  Placenta removal: Spontaneous  Placenta appearance: Intact  Placenta disposition: discarded       Cord    Vessels: 3 vessels  Complications: None  Delayed cord clamping?: Yes  Cord clamped date/time: 2024  Cord blood disposition: Lab, Refrigerator  Gases sent?: Yes  Stem cell collection (by provider): No       Lacerations    Episiotomy: None  Perineal laceration: 1st  Perineal laceration repaired?: Yes  Periurethral laceration?: Yes  Periurethral laceration repaired?: Yes  Repair suture: 3-0 Synthetic Suture, 3-0 Monocryl       Anesthesia    Method: Epidural       Operative Delivery    Forceps attempted?: No  Vacuum extractor attempted?: No       Shoulder Dystocia    Shoulder dystocia present?: No        Delivery    Time head delivered: 2024 00:21:00  Birth date/time: 2024 00:22:00  Delivery type: Vaginal, Spontaneous  Complications: None       Resuscitation    Method: Suctioning, Tactile stimulation, Continuous positive airway pressure (CPAP)       Apgars    Living status: Living  Apgar Component Scores:  1 min.:  5 min.:  10 min.:  15 min.:  20 min.:    Skin color:  0  1  1       Heart rate:  2  2  2      Reflex irritability:  1  1  2      Muscle tone:  1  2  2      Respiratory effort:  0  1  2      Total:  4  7  9      Apgars assigned by: BIENVENIDO BOLAND       Delivery Providers    Delivering clinician: HUGO Echols-LIONEL   Provider Role    Kymberly Flanagan, RN Delivery Nurse    Shiela Eldridge, RN Nursery Nurse    Anton George MD Resident    Linda Burden, DO Resident               Delivery Details:  Julisa Parnell, a 34 y.o.  female delivered a viable Female infant with Apgars of 4  and 7 . Patient was fully dilated and pushing after 47 hours 51 minutes in active labor. The patient was put in the dorsal lithotomy position and began pushing with good maternal effort and fetal descent. Pt  through several contractions.  FHT slow to recover between contractions. Dr George called to bedside to assess the need for vacuum assist. Pt delivered with next contraction by the hands of Dr George with assistance from him.     Delivery was via Vaginal, Spontaneous  to a sterile field under Epidural  anesthesia. Infant delivered in Vertex Middle Occiput Anterior  position. Anterior and posterior shoulders delivered without difficulty. The cord was clamped twice, cut and 3 vessels  were noted. Cord blood was obtained in routine fashion.  Cord complications were:  not noted . Intact placenta delivered at 2024 12:25 AM  by Dr Burden.  Fundal massage performed, fundus slightly soft but became firm with massage. Small amount of brisk bright red bleeding noted, stable with fundal massage. Rectal cytotec administered. Perineum, vagina, cervix were inspected, and the following lacerations were noted:   Harshad Parnell [[id number]]      Lacerations    Episiotomy: None  Perineal laceration: 1st  Perineal laceration repaired?: Yes  Periurethral laceration?: Yes  Periurethral laceration repaired?: Yes  Repair suture: 3-0 Synthetic Suture, 3-0 Monocryl            Any lacerations  were repaired in the usual fashion using 3-0 Synthetic Suture;3-0 Monocryl .  Periurethral/clitoral repair by Dr George and 1st degree lac repair by TERESE Osei CNM. Excellent hemostasis was noted. Needle count correct. Infant and patient in delivery room in good and stable condition.      Shefali Osei, HUGO-JAZZYM

## 2024-05-13 NOTE — CARE PLAN
Problem: Antepartum  Goal: Maintain pregnancy as long as maternal and/or fetal condition is stable  Outcome: Progressing  Goal: FHR remains reassuring  Outcome: Progressing     Problem: Vaginal Birth or  Section  Goal: Tolerate CRB for IOL placement maintenance until dislodgement/removal 12hrs after placement  Outcome: Progressing  Goal: Demonstrates labor coping techniques through delivery  Outcome: Progressing     Problem: Pain - Adult  Goal: Verbalizes/displays adequate comfort level or baseline comfort level  2024 by Arminda Trujillo RN  Outcome: Progressing  2024 by Arminda Trujillo RN  Outcome: Progressing     Problem: Safety - Adult  Goal: Free from fall injury  2024 by Arminda Trujillo RN  Outcome: Progressing  2024 by Arminda Trujillo RN  Outcome: Progressing     Problem: Postpartum  Goal: Experiences normal postpartum course  Outcome: Progressing  Goal: Appropriate maternal -  bonding  Outcome: Progressing  Goal: Establish and maintain infant feeding pattern for adequate nutrition  Outcome: Progressing  Goal: Incisions, wounds, or drain sites healing without S/S of infection  Outcome: Progressing  Goal: No s/sx infection  Outcome: Progressing  Goal: No s/sx of hemorrhage  Outcome: Progressing  Goal: Minimal s/sx of HDP and BP<160/110  Outcome: Progressing   The patient's goals for the shift include Rest, feed baby, bond with baby    The clinical goals for the shift include Stable VS

## 2024-05-14 PROCEDURE — 2500000001 HC RX 250 WO HCPCS SELF ADMINISTERED DRUGS (ALT 637 FOR MEDICARE OP)

## 2024-05-14 PROCEDURE — 2500000006 HC RX 250 W HCPCS SELF ADMINISTERED DRUGS (ALT 637 FOR ALL PAYERS)

## 2024-05-14 PROCEDURE — 2500000004 HC RX 250 GENERAL PHARMACY W/ HCPCS (ALT 636 FOR OP/ED)

## 2024-05-14 PROCEDURE — 1210000001 HC SEMI-PRIVATE ROOM DAILY

## 2024-05-14 RX ADMIN — ENOXAPARIN SODIUM 40 MG: 100 INJECTION SUBCUTANEOUS at 16:57

## 2024-05-14 RX ADMIN — ACETAMINOPHEN 975 MG: 325 TABLET ORAL at 22:01

## 2024-05-14 RX ADMIN — IBUPROFEN 600 MG: 600 TABLET ORAL at 10:09

## 2024-05-14 RX ADMIN — IBUPROFEN 600 MG: 600 TABLET ORAL at 16:57

## 2024-05-14 RX ADMIN — SERTRALINE HYDROCHLORIDE 50 MG: 50 TABLET ORAL at 22:01

## 2024-05-14 RX ADMIN — LURASIDONE HYDROCHLORIDE 60 MG: 20 TABLET, COATED ORAL at 22:01

## 2024-05-14 RX ADMIN — IBUPROFEN 600 MG: 600 TABLET ORAL at 03:04

## 2024-05-14 RX ADMIN — IBUPROFEN 600 MG: 600 TABLET ORAL at 23:00

## 2024-05-14 ASSESSMENT — PAIN SCALES - GENERAL
PAINLEVEL_OUTOF10: 4
PAINLEVEL_OUTOF10: 6
PAINLEVEL_OUTOF10: 4
PAINLEVEL_OUTOF10: 8
PAINLEVEL_OUTOF10: 0 - NO PAIN

## 2024-05-14 ASSESSMENT — PAIN DESCRIPTION - DESCRIPTORS
DESCRIPTORS: CRAMPING
DESCRIPTORS: CRAMPING;SORE
DESCRIPTORS: SORE

## 2024-05-14 ASSESSMENT — PAIN - FUNCTIONAL ASSESSMENT
PAIN_FUNCTIONAL_ASSESSMENT: 0-10
PAIN_FUNCTIONAL_ASSESSMENT: 0-10

## 2024-05-14 ASSESSMENT — PAIN DESCRIPTION - LOCATION: LOCATION: BACK

## 2024-05-14 NOTE — CARE PLAN
stable VS, Fundus firm, Bleeding light      Problem: Pain - Adult  Goal: Verbalizes/displays adequate comfort level or baseline comfort level  Outcome: Progressing     Problem: Postpartum  Goal: Experiences normal postpartum course  Outcome: Progressing  Goal: Appropriate maternal -  bonding  Outcome: Progressing  Goal: No s/sx infection  Outcome: Progressing  Goal: No s/sx of hemorrhage  Outcome: Progressing  Goal: Minimal s/sx of HDP and BP<160/110  Outcome: Progressing     Problem: Pain - Adult  Goal: Verbalizes/displays adequate comfort level or baseline comfort level  Outcome: Progressing     Problem: Postpartum  Goal: Experiences normal postpartum course  Outcome: Progressing  Goal: Appropriate maternal -  bonding  Outcome: Progressing  Goal: No s/sx infection  Outcome: Progressing  Goal: No s/sx of hemorrhage  Outcome: Progressing  Goal: Minimal s/sx of HDP and BP<160/110  Outcome: Progressing

## 2024-05-14 NOTE — CARE PLAN
Problem: Pain - Adult  Goal: Verbalizes/displays adequate comfort level or baseline comfort level  Outcome: Progressing     Problem: Safety - Adult  Goal: Free from fall injury  Outcome: Progressing     Problem: Postpartum  Goal: Experiences normal postpartum course  Outcome: Progressing  Goal: Appropriate maternal -  bonding  Outcome: Progressing  Goal: Minimal s/sx of HDP and BP<160/110  Outcome: Progressing

## 2024-05-14 NOTE — PROGRESS NOTES
Postpartum Progress Note    Assessment/Plan   Julisa Parnell is a 34 y.o., , who delivered at 37w6d gestation and is now postpartum day 1.    #Postpartum  - continue routine postpartum care  - pain well controlled on po medications  - dvt risk score   5 , for ppx lovenox  - pops for homegoing      #Maternal Well-Being  - emotional support provided  - bonding with infant    #[place] Feeding  - breastfeeding/pumping encouraged; lactation consult prn    #Dispo  - anticipate d/c on PPD #2 if meeting all postpartum milestones  - for f/u 4-6 weeks with Primary OB provider      Principal Problem:    Encounter for induction of labor ([facility])    Pregnancy Problems (from 10/19/23 to present)       Problem Noted Resolved    Non-reactive NST (non-stress test) 2024 by JIM Medeiros No    Priority:  Medium      37 weeks gestation of pregnancy ([facility]) 2024 by JIM Medeiros No    Priority:  Medium      Encounter for induction of labor ([facility]) 2024 by JIM Echols No    Priority:  Medium      Positive GBS test 2024 by JIM Ayala No    Priority:  Medium      Refusal of blood product 3/20/2024 by JIM Ayala No    Priority:  Medium      Overview Signed 2024  1:08 PM by Stephanie Ansari MD     24 Patient is a Religion. 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 She is taking an iron supplement- will increase to twice daily. Eating an iron rich diet as well.  Discussed risk of bleeding with labor and delivery, including risk of death. Patient understands this. She is willing to except blood components and cell saver, as well as albumin. Has been researching the different options. Her mother is her power of - will bring documentation with her to hospital. Discussed that we would go over list of options on admission with her to clarify her wishes.          Bipolar disease during pregnancy in third trimester  ([facility]) 10/14/2023 by Blessing Torres No    Priority:  Medium      Obesity in pregnancy ([facility]) 3/3/2023 by Lauren Landa, RN No    Priority:  Medium      Bipolar disorder, most recent episode manic ([facility]) 9/10/2021 by Lauren Landa RN No    Priority:  Medium      Anxiety disorder, unspecified 9/10/2021 by Lauren Landa, SAE No    Priority:  Medium            Hospital course: no complications  Vaginal Birth  Patient is currently breastfeedingThe patient's blood type is O POS. The baby's blood type is A POS . Rhogam is not indicated.    Subjective   Her pain is well controlled with current medications  She is passing flatus  She is ambulating well  She is tolerating a Adult diet Regular  She reports no breast or nursing problems  She denies emotional concerns today   Her plan for contraception is oral contraceptives     Very pleasant.  No questions or complaints.  Doing well.    Objective   Allergies:   Patient has no known allergies.         Last Vitals:  Temp Pulse Resp BP MAP Pulse Ox   36.8 °C (98.2 °F) 74 16 120/76   96 %     Vitals Min/Max Last 24 Hours:  Temp  Min: 36.2 °C (97.2 °F)  Max: 36.8 °C (98.2 °F)  Pulse  Min: 74  Max: 89  Resp  Min: 16  Max: 18  BP  Min: 110/76  Max: 120/76    Intake/Output:   No intake or output data in the 24 hours ending 05/14/24 1135    Physical Exam:  General: Examination reveals a well developed, well nourished, female, in no acute distress. She is alert and cooperative.  Lungs: symmetrical, non-labored breathing.  Cardiac: warm, well-perfused.  Abdomen: soft, non-tender.  Fundus: firm, at umbilicus, and nontender.  Extremities: no redness or tenderness in the calves or thighs.  Neurological: alert, oriented, normal speech, no focal findings or movement disorder noted.     Lab Data:  Lab Results   Component Value Date    WBC 20.4 (H) 05/13/2024    HGB 10.1 (L) 05/13/2024    HCT 31.4 (L) 05/13/2024     05/13/2024

## 2024-05-14 NOTE — LACTATION NOTE
Lactation Consultant Note  Lactation Consultation  Reason for Consult: Follow-up assessment  Consultant Name: ELDER DelgadoCLC    Maternal Information  Has mother  before?: No  Infant to breast within first 2 hours of birth?: Yes  Exclusive Pump and Bottle Feed: No    Maternal Assessment  Breast Assessment: Medium, Symmetrical, Soft, Compressible  Nipple Assessment: Intact, Erect  [place]ola Assessment: Normal    Infant Assessment  Infant Behavior: Deep sleep    Feeding Assessment  Nutrition Source: Breastmilk  Feeding Method: Nursing at the breast    LATCH TOOL       Breast Pump       Other OB Lactation Tools       Patient Follow-up       Other OB Lactation Documentation  Maternal Risk Factors: Age over 30, primiparity  Infant Risk Factors: Early term birth 37-39 weeks    Recommendations/Summary    I did not view a latch during this visit. The mother reports last feeding at 0900 for 20 minutes and that the infant is feeding well. She plans to attempt to feed by noon. If infant does awaken by then she will wake her up to attempt to feed. She is asked to call for observation of the latch.    Addendum: 1200 Mother called out for assistance with latching. Infant sleepy. She was placed skin to skin with her mother, however, she showed no interest in feeding despite stimulation. Mother will keep infant in skin to skin for another 15 minutes. If infant does not feed, she will attempt in 2 hours, or earlier if infant demonstrates feeding cues.

## 2024-05-15 ENCOUNTER — APPOINTMENT (OUTPATIENT)
Dept: OBSTETRICS AND GYNECOLOGY | Facility: CLINIC | Age: 34
End: 2024-05-15
Payer: MEDICAID

## 2024-05-15 VITALS
HEIGHT: 63 IN | TEMPERATURE: 98.2 F | SYSTOLIC BLOOD PRESSURE: 120 MMHG | HEART RATE: 88 BPM | DIASTOLIC BLOOD PRESSURE: 81 MMHG | OXYGEN SATURATION: 96 % | BODY MASS INDEX: 37.89 KG/M2 | WEIGHT: 213.85 LBS | RESPIRATION RATE: 16 BRPM

## 2024-05-15 PROCEDURE — 2500000001 HC RX 250 WO HCPCS SELF ADMINISTERED DRUGS (ALT 637 FOR MEDICARE OP)

## 2024-05-15 RX ORDER — POLYETHYLENE GLYCOL 3350 17 G/17G
17 POWDER, FOR SOLUTION ORAL 2 TIMES DAILY PRN
Qty: 14 PACKET | Refills: 0 | Status: SHIPPED | OUTPATIENT
Start: 2024-05-15

## 2024-05-15 RX ORDER — IBUPROFEN 600 MG/1
600 TABLET ORAL EVERY 6 HOURS PRN
Qty: 60 TABLET | Refills: 0 | Status: SHIPPED | OUTPATIENT
Start: 2024-05-15

## 2024-05-15 RX ORDER — NORETHINDRONE 0.35 MG/1
1 TABLET ORAL DAILY
Qty: 84 TABLET | Refills: 3 | Status: SHIPPED | OUTPATIENT
Start: 2024-05-15

## 2024-05-15 RX ORDER — ACETAMINOPHEN 325 MG/1
975 TABLET ORAL EVERY 6 HOURS PRN
Qty: 120 TABLET | Refills: 0 | Status: SHIPPED | OUTPATIENT
Start: 2024-05-15

## 2024-05-15 RX ADMIN — ACETAMINOPHEN 975 MG: 325 TABLET ORAL at 05:00

## 2024-05-15 RX ADMIN — IBUPROFEN 600 MG: 600 TABLET ORAL at 05:00

## 2024-05-15 RX ADMIN — IBUPROFEN 600 MG: 600 TABLET ORAL at 11:27

## 2024-05-15 RX ADMIN — ACETAMINOPHEN 975 MG: 325 TABLET ORAL at 11:27

## 2024-05-15 ASSESSMENT — PAIN SCALES - GENERAL
PAINLEVEL_OUTOF10: 6
PAINLEVEL_OUTOF10: 3
PAINLEVEL_OUTOF10: 2

## 2024-05-15 ASSESSMENT — PAIN DESCRIPTION - DESCRIPTORS
DESCRIPTORS: CRAMPING
DESCRIPTORS: CRAMPING

## 2024-05-15 ASSESSMENT — PAIN - FUNCTIONAL ASSESSMENT: PAIN_FUNCTIONAL_ASSESSMENT: 0-10

## 2024-05-15 NOTE — CARE PLAN
Problem: Pain - Adult  Goal: Verbalizes/displays adequate comfort level or baseline comfort level  Outcome: Met     Problem: Postpartum  Goal: Experiences normal postpartum course  Outcome: Met  Goal: Appropriate maternal -  bonding  Outcome: Met  Goal: No s/sx infection  Outcome: Met  Goal: No s/sx of hemorrhage  Outcome: Met  Goal: Minimal s/sx of HDP and BP<160/110  Outcome: Met   The patient's goals for the shift include clear for d/c!    The clinical goals for the shift include light to moderate lochia

## 2024-05-15 NOTE — DISCHARGE SUMMARY
"Discharge Summary    Julisa Parnell is a 34 y.o. year old female   Admission Date: 5/11/2024  Discharge Date: 05/15/24       Discharge Diagnosis  Spontaneous vaginal delivery    Hospital Course  Delivery Date: 5/13/2024  12:22 AM   Delivery type: Vaginal, Spontaneous    GA at delivery: 37w6d   Outcome: Living   Intrapartum complications: None   Feeding method: Breastfeeding Status: Yes     Elevated BP without dx HDP - baseline HELLP labs negative    Procedures: none  Contraception at discharge: POPs    Pertinent Physical Exam At Time of Discharge    General: Examination reveals a well developed, well nourished, female, in no acute distress. She is alert and cooperative.  Lungs: symmetrical, non-labored breathing.  Cardiac: warm, well-perfused.  Abdomen: soft, non-tender.  Fundus: firm, below umbilicus, and nontender.  Extremities: no redness or tenderness in the calves or thighs.  Neurological: alert, oriented, normal speech, no focal findings or movement disorder noted.     Vitals  /81   Pulse 88   Temp 36.8 °C (98.2 °F) (Temporal)   Resp 16   Ht 1.6 m (5' 3\")   Wt 97 kg (213 lb 13.5 oz)   LMP 08/22/2023 (Exact Date)   SpO2 96%   Breastfeeding Yes   BMI 37.88 kg/m²      Discharge Meds     Your medication list        START taking these medications        Instructions Last Dose Given Next Dose Due   acetaminophen 325 mg tablet  Commonly known as: Tylenol      Take 3 tablets (975 mg) by mouth every 6 hours if needed for mild pain (1 - 3) or moderate pain (4 - 6).       ibuprofen 600 mg tablet      Take 1 tablet (600 mg) by mouth every 6 hours if needed for mild pain (1 - 3) or moderate pain (4 - 6).       norethindrone 0.35 mg tablet  Commonly known as: Micronor      Take 1 tablet (0.35 mg) over 365 days by mouth once daily.       polyethylene glycol 17 gram packet  Commonly known as: Glycolax, Miralax      Take 17 g by mouth 2 times a day as needed (constipation).              CONTINUE taking " these medications        Instructions Last Dose Given Next Dose Due   LORazepam 0.5 mg tablet  Commonly known as: Ativan      Take 1 tablet (0.5 mg) by mouth 3 (three) times a week for 21 days. Take 1/2 to 1 tab po prn SEVERE ANXIETY for up to three times per week       lurasidone 40 mg tablet  Commonly known as: Latuda      Take 1 tablet (40 mg) by mouth once daily.       lurasidone 60 mg tablet  Commonly known as: Latuda      Take 1 tablet (60 mg) by mouth once daily.       prenatal vitamin (iron-folic) 27 mg iron-800 mcg folic acid tablet      Take 1 tablet by mouth once daily.       sertraline 50 mg tablet  Commonly known as: Zoloft      Take 1 tablet (50 mg) by mouth once daily.              STOP taking these medications      aspirin 81 mg EC tablet        doxylamine 25 mg tablet  Commonly known as: Unisom (doxylamine)        Mini Prenatal 6.75 mg iron- 200 mcg tablet  Generic drug: prenatal vit 49-iron fum-folic                  Where to Get Your Medications        These medications were sent to [id number] - [place] [id number] [address]      Phone: [phone]   acetaminophen 325 mg tablet  ibuprofen 600 mg tablet  norethindrone 0.35 mg tablet  polyethylene glycol 17 gram packet          Complications Requiring Follow-Up  None    Test Results Pending At Discharge  Pending Labs       No current pending labs.            Outpatient Follow-Up  Follow up with your primary OB in 4-6 weeks for postpartum visit    I spent 20 minutes in the professional and overall care of this patient.      Elizabeth Cameron, HUGO-CNP

## 2024-05-15 NOTE — CARE PLAN
Problem: Pain - Adult  Goal: Verbalizes/displays adequate comfort level or baseline comfort level  Outcome: Progressing     Problem: Postpartum  Goal: Experiences normal postpartum course  Outcome: Progressing  Goal: Appropriate maternal -  bonding  Outcome: Progressing  Goal: Minimal s/sx of HDP and BP<160/110  Outcome: Progressing

## 2024-05-15 NOTE — LACTATION NOTE
Lactation Consultant Note    Recommendations/Summary  I did not view a latch this visit. Mom states that breastfeeding is going well. Baby is sleepier during the day and is cluster feeding at night, which I reassured her is typical, although exhausting. We discussed that babies often feed more often in the early morning hours because milk supply is highest at this time. By feeding so much overnight, mom will naturally increase her milk supply. Mom expressed understanding. Discussed breast compression and infant stimulation techniques to keep baby awake and actively feeding at the breast. Briefly discussed pumping at home to build a freezer breast milk supply for when mom returns to work. Reviewed recommended  breastfeeding frequency and duration. Mom has a pump for at home. We reviewed the outpatient lactation information.

## 2024-05-22 ENCOUNTER — APPOINTMENT (OUTPATIENT)
Dept: OBSTETRICS AND GYNECOLOGY | Facility: CLINIC | Age: 34
End: 2024-05-22
Payer: MEDICAID

## 2024-05-29 ENCOUNTER — APPOINTMENT (OUTPATIENT)
Dept: OBSTETRICS AND GYNECOLOGY | Facility: CLINIC | Age: 34
End: 2024-05-29
Payer: MEDICAID

## 2024-05-29 ENCOUNTER — SOCIAL WORK (OUTPATIENT)
Dept: BEHAVIORAL HEALTH | Facility: CLINIC | Age: 34
End: 2024-05-29
Payer: MEDICAID

## 2024-05-29 DIAGNOSIS — F31.9 BIPOLAR 1 DISORDER (MULTI): ICD-10-CM

## 2024-05-29 PROCEDURE — 90837 PSYTX W PT 60 MINUTES: CPT

## 2024-05-29 RX ORDER — OXYTOCIN/0.9 % SODIUM CHLORIDE 30/500 ML
60 PLASTIC BAG, INJECTION (ML) INTRAVENOUS
COMMUNITY
Start: 2024-05-11

## 2024-05-29 RX ORDER — CARBOPROST TROMETHAMINE 250 UG/ML
250 INJECTION, SOLUTION INTRAMUSCULAR
COMMUNITY
Start: 2024-05-11

## 2024-05-29 RX ORDER — SODIUM CHLORIDE, SODIUM LACTATE, POTASSIUM CHLORIDE, CALCIUM CHLORIDE 600; 310; 30; 20 MG/100ML; MG/100ML; MG/100ML; MG/100ML
125 INJECTION, SOLUTION INTRAVENOUS
COMMUNITY
Start: 2024-05-11

## 2024-05-29 RX ORDER — TERBUTALINE SULFATE 1 MG/ML
0.25 INJECTION SUBCUTANEOUS
COMMUNITY
Start: 2024-05-11

## 2024-05-29 RX ORDER — FENTANYL/ROPIVACAINE/NS/PF 2MCG/ML-.2
PLASTIC BAG, INJECTION (ML) INJECTION
COMMUNITY
Start: 2024-05-11

## 2024-05-29 RX ORDER — OXYTOCIN 10 [USP'U]/ML
10 INJECTION, SOLUTION INTRAMUSCULAR; INTRAVENOUS
COMMUNITY
Start: 2024-05-11

## 2024-05-29 RX ORDER — TRANEXAMIC ACID 100 MG/ML
1000 INJECTION, SOLUTION INTRAVENOUS
COMMUNITY
Start: 2024-05-11

## 2024-05-29 RX ORDER — MISOPROSTOL 200 UG/1
800 TABLET ORAL
COMMUNITY
Start: 2024-05-11

## 2024-05-29 RX ORDER — LIDOCAINE HYDROCHLORIDE 10 MG/ML
30 INJECTION INFILTRATION; PERINEURAL
COMMUNITY
Start: 2024-05-11

## 2024-05-29 RX ORDER — METHYLERGONOVINE MALEATE 0.2 MG/ML
0.2 INJECTION INTRAVENOUS
COMMUNITY
Start: 2024-05-11

## 2024-05-29 RX ORDER — LOPERAMIDE HYDROCHLORIDE 2 MG/1
4 CAPSULE ORAL
COMMUNITY
Start: 2024-05-11

## 2024-05-29 NOTE — PROGRESS NOTES
Therapy Session  Progress Note    Session Type: Virtual    Start Time: 1:00 pm  End Time:  1:55 pm    Appointment: Scheduled    Reason for Visit: Bipolar Disorder    Patient Symptoms/Interval History: Mood has been good, her new baby (Elsa) is 2 weeks old now. Had a bishop first week since her boyfriend and mother had some conflict. But now feels like she is doing well and adjusting to being a new mother. Identified that her independence is important to her, has plans to go back to work in July and would like to move in with her boyfriend this summer. Has concerns about her boyfriend's temper and how he ruins her things (plants, wine glasses) when he gets upset. Patient understands her mother's concerns about them moving in together but wants to work on creating her own family.    Mental Status: Alert & Oriented x3    Functional Status: No impairment    Interventions Provided: Values Exploration, Review Progress/Goals    Progress Made: Moderate    Response to Interventions: The patient was receptive to the interventions provided.    Action Plan/Homework: The patient would like to continue to work on living one day at a time. She will continue to do her daily journaling.    Treatment Plan: Use CBT to help the patient gain awareness of unhelpful thought patterns and core beliefs. Help the patient learn healthy coping skills to manage depression and symptoms of bipolar disorder.    Follow Up/Next Appointment: Follow up in 2 weeks.      MAYLIN Yanes, CHARLIE-S    **This visit was performed using real-time telehealth tools, including an audio/video connection between the patient and this provider. The patient provided consent for the individual telemedicine service and understands the limitations of the visit.

## 2024-05-29 NOTE — PSYCHOTHERAPY
Had her on 5/13 - she was early  Mom, BF and grandma in room - 36 hours  Hagan siomara Hunter  BF- is happy about it, he got to stay with her at hospital    Drama between BF and her mom (SCARLET is [place])  She was living with him    Biggest issue - he's not that financially stable  He's been living w/ his friend    He has a temper, blows up, bad arguments  He destroys some of her things  He breas her things    He justifies it  Get along better when they don't live together  Both in a better headspace to to try again    He's working now, two jobs  Mom is concerned     Mom concerned re bipolar, Kvng smokes  He's saving up money for a place    Mood-wife - feels relaxed, same dose , even keel  First week - had to center herself, did writing  He went into a shell  She felt alone, journaled, resting, reflective        Journaling - only write about today, narrow down her thoughts  Slowing thoughts down,   Reflect     Drop what you want to happen and just let things happen    Go with the flow - practice  Antsy - concerns about finances, make sure she makes good decisions    Rough draft of expenses/budget  Has multiple journals  Has to balance checkbook each day  Impulse shopping  Need to calculate every expense - keeps a strict ledger  Part of self-care    Focus on money   Wakes up every 2 hours to night    Went to [place]'s family house    Looking forward to going back to work  3 days/week  [place] is important to her    Parents protective d/t bipolar  Make boundaries as an adult  Tries to be understanding    Wants to go back to work in July

## 2024-05-30 ENCOUNTER — TELEMEDICINE (OUTPATIENT)
Dept: BEHAVIORAL HEALTH | Facility: CLINIC | Age: 34
End: 2024-05-30
Payer: MEDICAID

## 2024-05-30 ENCOUNTER — APPOINTMENT (OUTPATIENT)
Dept: OBSTETRICS AND GYNECOLOGY | Facility: CLINIC | Age: 34
End: 2024-05-30
Payer: MEDICAID

## 2024-05-30 DIAGNOSIS — F31.9 BIPOLAR 1 DISORDER (MULTI): ICD-10-CM

## 2024-05-30 PROCEDURE — 99213 OFFICE O/P EST LOW 20 MIN: CPT | Performed by: CLINICAL NURSE SPECIALIST

## 2024-05-30 NOTE — PROGRESS NOTES
Subjective   Patient ID: Julisa Parnell is a 34 y.o. female who presents for No chief complaint on file..    HPI  Review of Systems   All other systems reviewed and are negative.     Psych Review of Symptoms  Objective   Physical Exam  Psychiatric:         Attention and Perception: Attention and perception normal.         Mood and Affect: Mood and affect normal.         Speech: Speech normal.         Behavior: Behavior normal. Behavior is cooperative.         Thought Content: Thought content normal.         Cognition and Memory: Cognition and memory normal.         Judgment: Judgment normal.     INTERIM: Delivered baby girlElsa, on May 13 via induced . Denies experience of traumatic delivery. Sleep adequate due to use of Unisom, and nap in daytime. Breastfeeding going well. Has sufficient help w/ childcare ie mother and boyfriend who is FOB. . Mood is relaxed, first week was restless Not using Lorazepam 0. 5mg only used x 1 . Managed on Lurasidone 60 mg anf Sertraline 50 mg po qam. Ind therapy q 2 weeks. Denies PP Blues. Bonding well. Denies feeing overwhelmed, or stressed out,  Deciding when to go back to work, probable RTW PT for 3 day per week, for one month. Considering contraception w/ low risk of mood lability . t                   Assessment/Plan   IMP:  IMP: 34 year old female who presents now with Bipolar 1 Disorder and now 2 weeks pp after delivery of first child. Taking Latuda 60 mg and 50 mg Sertraline every day . Mood and anxiety appear well controlled on current regime. Denies s/sx of psychosis, hypomania, SI/HI. Continuing therapy biweekly. Plan to RTW PT in one month.   Still with the FOB, but not living together. Interested in starting  IOP. Rarely used Lorazepam 0.5mg PRN severe anxiety.      Diagnoses   BPAD 1   Two weeks pp   Single Status       Treatment       Begin Lorazepam 0.5mg po take 1/2 to 1 up to 3 time per week ordered      Cont Latuda to 60 mg po at bedtime       Continue Sertraline 50 mg every day      RTC 1 month       Contact provider via My Chart as needed     Refer to  PN IOP as pt is able to decrease work demands

## 2024-06-13 ENCOUNTER — APPOINTMENT (OUTPATIENT)
Dept: BEHAVIORAL HEALTH | Facility: CLINIC | Age: 34
End: 2024-06-13
Payer: MEDICAID

## 2024-06-13 DIAGNOSIS — F31.9 BIPOLAR 1 DISORDER (MULTI): ICD-10-CM

## 2024-06-13 PROCEDURE — 90837 PSYTX W PT 60 MINUTES: CPT

## 2024-06-17 NOTE — PROGRESS NOTES
Therapy Session  Progress Note    Session Type: Virtual    Start Time: 2:00 pm  End Time: 2:55 pm    Appointment: Scheduled    Reason for Visit: Bipolar Disorder    Patient Symptoms/Interval History: Mood has been stable, although she feels bored by not working. Adjusting to being a mother and enjoying that role. Looking forward to going back to work soon. Plans to try to get a place to live with her boyfriend/father of the baby. Is concerned about his difficulty keeping a job and the fact that her mother doesn't like him. Has noted he has anger issues but feels like they can work on these issues. Feels hopeful that they can build a family life together.    Mental Status: Alert & Oriented x3    Functional Status: No impairment    Interventions Provided: CBT, Values Exploration    Progress Made: Moderate    Response to Interventions: The patient was receptive to the interventions provided.    Action Plan/Homework: The patient would like to work on getting out of the house more as a form of self-care.    Treatment Plan: Use CBT to help the patient gain awareness of unhelpful thought patterns and core beliefs. Help the patient learn healthy coping skills to manage symptoms of bipolar disorder.    Follow Up/Next Appointment: Follow up in 2 weeks.      MAYLIN Yanes, PANCHITOW-S    **This visit was performed using real-time telehealth tools, including an audio/video connection between the patient and this provider. The patient provided consent for the individual telemedicine service and understands the limitations of the visit.

## 2024-06-18 ENCOUNTER — DOCUMENTATION (OUTPATIENT)
Dept: OBSTETRICS AND GYNECOLOGY | Facility: CLINIC | Age: 34
End: 2024-06-18
Payer: MEDICAID

## 2024-06-18 NOTE — PROGRESS NOTES
Spoke with [employer] representative transferred from  verified pt's contact information and policy number so that order can be completed

## 2024-06-26 PROBLEM — R51.9 HEADACHE: Status: ACTIVE | Noted: 2024-04-29

## 2024-06-26 PROBLEM — E86.0 DEHYDRATION: Status: ACTIVE | Noted: 2024-01-18

## 2024-06-26 PROBLEM — H52.13 MYOPIA OF BOTH EYES: Status: ACTIVE | Noted: 2023-10-14

## 2024-06-26 PROBLEM — J01.90 ACUTE RHINOSINUSITIS: Status: ACTIVE | Noted: 2023-10-06

## 2024-06-26 PROBLEM — F32.A DEPRESSIVE DISORDER: Status: ACTIVE | Noted: 2023-10-14

## 2024-06-27 ENCOUNTER — APPOINTMENT (OUTPATIENT)
Dept: BEHAVIORAL HEALTH | Facility: CLINIC | Age: 34
End: 2024-06-27
Payer: MEDICAID

## 2024-07-03 ENCOUNTER — APPOINTMENT (OUTPATIENT)
Dept: OBSTETRICS AND GYNECOLOGY | Facility: CLINIC | Age: 34
End: 2024-07-03
Payer: MEDICAID

## 2024-07-03 VITALS — WEIGHT: 202 LBS | DIASTOLIC BLOOD PRESSURE: 62 MMHG | SYSTOLIC BLOOD PRESSURE: 104 MMHG | BODY MASS INDEX: 35.78 KG/M2

## 2024-07-03 ASSESSMENT — EDINBURGH POSTNATAL DEPRESSION SCALE (EPDS)
I HAVE LOOKED FORWARD WITH ENJOYMENT TO THINGS: AS MUCH AS I EVER DID
I HAVE BLAMED MYSELF UNNECESSARILY WHEN THINGS WENT WRONG: NO, NEVER
I HAVE BEEN SO UNHAPPY THAT I HAVE BEEN CRYING: NO, NEVER
I HAVE FELT SCARED OR PANICKY FOR NO GOOD REASON: NO, NOT MUCH
I HAVE BEEN SO UNHAPPY THAT I HAVE HAD DIFFICULTY SLEEPING: NOT AT ALL
I HAVE BEEN ABLE TO LAUGH AND SEE THE FUNNY SIDE OF THINGS: AS MUCH AS I ALWAYS COULD
THE THOUGHT OF HARMING MYSELF HAS OCCURRED TO ME: NEVER
TOTAL SCORE: 1
I HAVE FELT SAD OR MISERABLE: NO, NOT AT ALL
I HAVE BEEN ANXIOUS OR WORRIED FOR NO GOOD REASON: NO, NOT AT ALL
THINGS HAVE BEEN GETTING ON TOP OF ME: NO, I HAVE BEEN COPING AS WELL AS EVER

## 2024-07-03 NOTE — PROGRESS NOTES
Plan    Advised to call office for breast complaints, abnormal bleeding, mood changes, or other concerning symptoms.   Cleared to resume normal activity as desired    Diagnoses and all orders for this visit:  Routine postpartum follow-up ([facility])    Patient met with SAWYER Nicole to discuss her TENS unit IUD study. Patient is interested and plans to schedule IUD placement through study.   Encouraged to reach out to our office with any questions or concerns.   Encouraged patient to follow up annually or PRN      JIM Ayala    Subjective   34 y.o.  presenting for postpartum follow-up     Delivery Date: 24  Gestational Age: <None>   Type of Delivery: Vaginal, Spontaneous      Pregnancy Problems (from 10/19/23 to present)       Problem Noted Resolved    Positive GBS test 2024 by JIM Ayala No    Priority:  Medium      Refusal of blood product 3/20/2024 by JIM Ayala No    Priority:  Medium      Overview Signed 2024  1:08 PM by Stephanie Ansari MD     24 Patient is a [employer]. 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 She is taking an iron supplement- will increase to twice daily. Eating an iron rich diet as well.  Discussed risk of bleeding with labor and delivery, including risk of death. Patient understands this. She is willing to except blood components and cell saver, as well as albumin. Has been researching the different options. Her mother is her power of - will bring documentation with her to hospital. Discussed that we would go over list of options on admission with her to clarify her wishes.          Bipolar disease during pregnancy in third trimester ([facility]) 10/14/2023 by Blessing Torres No    Priority:  Medium      Obesity in pregnancy ([facility]) 3/3/2023 by Lauren Landa, RN No    Priority:  Medium      Bipolar disorder, most recent episode manic ([facility]) 9/10/2021 by Lauren Landa, SAE No    Priority:  Medium       Anxiety disorder, unspecified 9/10/2021 by Lauren Landa, RN No    Priority:  Medium      Non-reactive NST (non-stress test) 2024 by JIM Medeiros No    37 weeks gestation of pregnancy ([facility]) 2024 by JIM Medeiros No    Encounter for induction of labor ([facility]) 2024 by JIM Echols No            Concerns: none, patient reports that she is feeling well.     Pain: controlled  Lacerations: 1st degree  Lochia: none  Sexual Intimacy: Yes- after lochia   Contraceptive Method: POP and plans for IUD  Feeding Method: She is breast feeding with some supplementation. no breast or nursing problems  Lactation Consult Needed?: No    Birth Trauma: No  Bonding with Baby: well with baby girl  Mood:   Postpartum Depression: Low Risk  (7/3/2024)    [place]  Depression Scale     Last EPDS Total Score: 1     Last EPDS Self Harm Result: Never       Objective    /62   Wt 91.6 kg (202 lb)   LMP 2023 (Exact Date)   Breastfeeding Yes   BMI 35.78 kg/m²      General:   Alert and oriented x 3   Heart:  Lungs: Regular rate, rhythm  Clear to auscultation bilaterally   Thyroid: Euthyroid, normal shape and size   Breast: Symmetrical, no skin changes/nipple discharge, redness, tenderness, no masses palpated bilaterally   Abdomen: Soft, non tender   Vulva: EGBUS normal   Vagina: Pink, normal discharge   Cervix: No CMT   Uterus: Normal shape, size   Adnexa: NT bilaterally

## 2024-07-05 ENCOUNTER — TELEPHONE (OUTPATIENT)
Dept: OBSTETRICS AND GYNECOLOGY | Facility: CLINIC | Age: 34
End: 2024-07-05
Payer: MEDICAID

## 2024-07-05 NOTE — TELEPHONE ENCOUNTER
Pt verified by name and .  Nurse calling pt to scheduled for IUD insert per Mark Banks.  Pt can make the  noon appt.  Nurse will schedule.  Pt has no questions at this time.

## 2024-07-10 ENCOUNTER — APPOINTMENT (OUTPATIENT)
Dept: OBSTETRICS AND GYNECOLOGY | Facility: CLINIC | Age: 34
End: 2024-07-10
Payer: MEDICAID

## 2024-07-10 VITALS
WEIGHT: 204 LBS | HEIGHT: 63 IN | SYSTOLIC BLOOD PRESSURE: 120 MMHG | BODY MASS INDEX: 36.14 KG/M2 | DIASTOLIC BLOOD PRESSURE: 74 MMHG

## 2024-07-10 DIAGNOSIS — Z30.430 ENCOUNTER FOR INSERTION OF MIRENA IUD: ICD-10-CM

## 2024-07-10 LAB — PREGNANCY TEST URINE, POC: NEGATIVE

## 2024-07-10 PROCEDURE — 81025 URINE PREGNANCY TEST: CPT | Performed by: NURSE PRACTITIONER

## 2024-07-10 PROCEDURE — 58300 INSERT INTRAUTERINE DEVICE: CPT | Performed by: NURSE PRACTITIONER

## 2024-07-10 ASSESSMENT — PAIN SCALES - GENERAL: PAINLEVEL: 0-NO PAIN

## 2024-07-10 ASSESSMENT — ENCOUNTER SYMPTOMS
EYES NEGATIVE: 0
ALLERGIC/IMMUNOLOGIC NEGATIVE: 0
CONSTITUTIONAL NEGATIVE: 0
NEUROLOGICAL NEGATIVE: 0
PSYCHIATRIC NEGATIVE: 0
CARDIOVASCULAR NEGATIVE: 0
MUSCULOSKELETAL NEGATIVE: 0
ENDOCRINE NEGATIVE: 0
HEMATOLOGIC/LYMPHATIC NEGATIVE: 0
GASTROINTESTINAL NEGATIVE: 0
RESPIRATORY NEGATIVE: 0

## 2024-07-10 NOTE — PROGRESS NOTES
Subjective   Patient ID: Julisa Parnell is a 34 y.o. female who presents for IUD Insertion.  HPI  Pt presents for IUD insertion  Review of Systems    Objective   Physical Exam    Assessment/Plan            SAWYER Watters 07/10/24 12:36 PM Patient ID: Julisa Parnell is a 34 y.o. female.    IUD Insertion    Performed by: SAWYER Watters  Authorized by: SAWYER Watters    Procedure: IUD insertion    Consent obtained by patient, parent, or legal power of  - including discussion of procedure risks and benefits, patient questions answered, and patient education provided: yes    Pregnancy risk: reasonably certain the patient is not pregnant    Date/Time of Insertion:  7/10/2024 12:52 PM  Immediately prior to procedure a time out was called: yes    Pelvic exam performed: yes    Speculum placed in vagina: yes    Cervix cleaned and prepped: yes    Tenaculum/Allis/Ring Forceps applied to cervix: yes    Anesthesia used: no    Uterus sound depth (cm):  7.5  Cervix manually dilated: no    IUD inserted without complications: yes    OSM: levonorgestrel (Mirena) IUD 20 mcg/day  Strings trimmed to (cm):  4  Patient tolerated procedure well: yes    Inserted with ultrasound guidance: no    Transvaginal sono confirmed fundal placement: no    Intended removal date: 8 years

## 2024-07-16 ENCOUNTER — TELEMEDICINE (OUTPATIENT)
Dept: BEHAVIORAL HEALTH | Facility: HOSPITAL | Age: 34
End: 2024-07-16
Payer: MEDICAID

## 2024-07-16 DIAGNOSIS — F31.9 BIPOLAR 1 DISORDER (MULTI): ICD-10-CM

## 2024-07-16 PROCEDURE — 99214 OFFICE O/P EST MOD 30 MIN: CPT | Performed by: CLINICAL NURSE SPECIALIST

## 2024-07-16 PROCEDURE — 99214 OFFICE O/P EST MOD 30 MIN: CPT | Mod: GT,U1 | Performed by: CLINICAL NURSE SPECIALIST

## 2024-07-16 RX ORDER — LURASIDONE HYDROCHLORIDE 60 MG/1
60 TABLET, FILM COATED ORAL DAILY
Qty: 90 TABLET | Refills: 0 | Status: SHIPPED | OUTPATIENT
Start: 2024-07-16 | End: 2025-07-16

## 2024-07-16 NOTE — PROGRESS NOTES
Subjective   Patient ID: Julisa Parnell is a 34 y.o. female who presents for management og BPAD.   HPI  Review of Systems   All other systems reviewed and are negative.     Psych Review of Symptoms  Objective   Physical Exam  Psychiatric:         Attention and Perception: Attention and perception normal.         Mood and Affect: Mood and affect normal.         Speech: Speech normal.         Behavior: Behavior normal. Behavior is cooperative.         Thought Content: Thought content normal.         Cognition and Memory: Cognition and memory normal.         Judgment: Judgment normal.     INTERIM: Two months pp. Reports feeling well connected to baby, breast and bottle feeding. Baby tolerating exposure to medication, meeting developmental milestones. Patient    RTW last week for 3 days per week, states 'feels very doable,' loves working and back in contact with others. Feels need to be out of home as used to being more active. State benefits from the structure/schedule of work. FOB involved, attempting to make it work, relationship improving, and supports  patient . Seeing ind therapist q 2 weeks      Mood most of time 'great', feeling calm, denies depressed sad mood, lability or any hypomania. Using journal, grounding techniques to stay in moment. Denies anxiety, not using PRN Lorazepam. Takes Unisom 3-4 time per week w/ effect for sleep. Stable on Latuda 60 mg and Sertraline 50 mg po qm Reports this is longest and best mood has been.   Her mother cares for baby while patient is working .     Assessment/Plan   IMP: 34 year old female who presents now with Bipolar 1 Disorder and now 2 months pp after delivery of first child. Taking Latuda 60 mg and 50 mg Sertraline every day. Mood and anxiety appear well controlled on current regime. Denies s/sx of psychosis, hypomania, SI/HI. Continuing therapy biweekly. RTW PT and going well. With the FOB and working to be a family.  Rarely used Lorazepam 0.5mg PRN severe  anxiety.  Seeing ind therapist w/ effect.      Diagnoses   BPAD 1   Two months pp   Single Status       Treatment     Cont Latuda to 60 mg po at bedtime ordered 90 NRF     Continue Sertraline 50 mg every day  sufficient supply     RTC 6  weeks        Contact provider via My Chart as needed     Refer to  PN IOP as pt is able to decrease work demands                   Instructions

## 2024-07-23 ENCOUNTER — APPOINTMENT (OUTPATIENT)
Dept: BEHAVIORAL HEALTH | Facility: CLINIC | Age: 34
End: 2024-07-23
Payer: MEDICAID

## 2024-09-05 ENCOUNTER — APPOINTMENT (OUTPATIENT)
Dept: BEHAVIORAL HEALTH | Facility: CLINIC | Age: 34
End: 2024-09-05
Payer: MEDICAID

## 2024-10-10 ENCOUNTER — APPOINTMENT (OUTPATIENT)
Dept: OBSTETRICS AND GYNECOLOGY | Facility: CLINIC | Age: 34
End: 2024-10-10
Payer: MEDICAID

## 2024-10-29 ENCOUNTER — TELEMEDICINE (OUTPATIENT)
Dept: BEHAVIORAL HEALTH | Facility: HOSPITAL | Age: 34
End: 2024-10-29
Payer: MEDICAID

## 2024-10-29 DIAGNOSIS — F31.9 BIPOLAR DISEASE DURING PREGNANCY IN THIRD TRIMESTER (MULTI): ICD-10-CM

## 2024-10-29 DIAGNOSIS — O99.343 BIPOLAR DISEASE DURING PREGNANCY IN THIRD TRIMESTER (MULTI): ICD-10-CM

## 2024-10-29 DIAGNOSIS — F31.9 BIPOLAR 1 DISORDER (MULTI): ICD-10-CM

## 2024-10-29 PROCEDURE — 99215 OFFICE O/P EST HI 40 MIN: CPT | Performed by: CLINICAL NURSE SPECIALIST

## 2024-10-29 PROCEDURE — 99215 OFFICE O/P EST HI 40 MIN: CPT | Mod: GT,U1 | Performed by: CLINICAL NURSE SPECIALIST

## 2024-10-29 RX ORDER — LURASIDONE HYDROCHLORIDE 80 MG/1
80 TABLET, FILM COATED ORAL DAILY
Qty: 90 TABLET | Refills: 0 | Status: SHIPPED | OUTPATIENT
Start: 2024-10-29 | End: 2025-10-29

## 2024-10-29 RX ORDER — TRAZODONE HYDROCHLORIDE 100 MG/1
100 TABLET ORAL NIGHTLY
Qty: 90 TABLET | Refills: 0 | Status: SHIPPED | OUTPATIENT
Start: 2024-10-29 | End: 2025-10-29

## 2024-11-12 ENCOUNTER — APPOINTMENT (OUTPATIENT)
Dept: OBSTETRICS AND GYNECOLOGY | Facility: CLINIC | Age: 34
End: 2024-11-12
Payer: MEDICAID

## 2024-11-12 ENCOUNTER — SOCIAL WORK (OUTPATIENT)
Dept: BEHAVIORAL HEALTH | Facility: CLINIC | Age: 34
End: 2024-11-12
Payer: MEDICAID

## 2024-11-12 VITALS
DIASTOLIC BLOOD PRESSURE: 86 MMHG | WEIGHT: 198.8 LBS | HEIGHT: 63 IN | SYSTOLIC BLOOD PRESSURE: 112 MMHG | BODY MASS INDEX: 35.22 KG/M2

## 2024-11-12 DIAGNOSIS — Z11.3 SCREENING EXAMINATION FOR STI: ICD-10-CM

## 2024-11-12 DIAGNOSIS — F31.9 BIPOLAR 1 DISORDER (MULTI): ICD-10-CM

## 2024-11-12 DIAGNOSIS — Z30.430 ENCOUNTER FOR IUD INSERTION: Primary | ICD-10-CM

## 2024-11-12 LAB — PREGNANCY TEST URINE, POC: NEGATIVE

## 2024-11-12 PROCEDURE — 87491 CHLMYD TRACH DNA AMP PROBE: CPT

## 2024-11-12 PROCEDURE — 87661 TRICHOMONAS VAGINALIS AMPLIF: CPT

## 2024-11-12 PROCEDURE — 58300 INSERT INTRAUTERINE DEVICE: CPT | Performed by: OBSTETRICS & GYNECOLOGY

## 2024-11-12 PROCEDURE — 81025 URINE PREGNANCY TEST: CPT | Performed by: OBSTETRICS & GYNECOLOGY

## 2024-11-12 PROCEDURE — 87591 N.GONORRHOEAE DNA AMP PROB: CPT

## 2024-11-12 PROCEDURE — 90837 PSYTX W PT 60 MINUTES: CPT

## 2024-11-12 ASSESSMENT — PAIN SCALES - GENERAL: PAINLEVEL_OUTOF10: 4

## 2024-11-12 NOTE — PROGRESS NOTES
Patient ID: Julisa Parnell is a 34 y.o. female.    IUD Insertion    Performed by: Norma Alexander MD  Authorized by: Norma Alexander MD    Procedure: IUD insertion    Consent obtained by patient, parent, or legal power of  - including discussion of procedure risks and benefits, patient questions answered, and patient education provided: yes    Pregnancy risk: reasonably certain the patient is not pregnant    Date/Time of Insertion:  11/12/2024 9:36 AM  Immediately prior to procedure a time out was called: yes    Pelvic exam performed: no    Speculum placed in vagina: yes    Cervix cleaned and prepped: yes    Tenaculum/Allis/Ring Forceps applied to cervix: yes    Anesthesia used: no    Cervix manually dilated: no    IUD inserted without complications: yes    OSM: levonorgestreL 17.5 mcg/24 hr (5 yrs) 19.5 mg  Strings trimmed to (cm):  3  Patient tolerated procedure well: yes    Inserted with ultrasound guidance: no    Transvaginal sono confirmed fundal placement: no    Intended removal date: 5 years
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
## 2024-11-13 LAB
C TRACH RRNA SPEC QL NAA+PROBE: NEGATIVE
N GONORRHOEA DNA SPEC QL PROBE+SIG AMP: NEGATIVE
T VAGINALIS RRNA SPEC QL NAA+PROBE: NEGATIVE

## 2024-12-05 ENCOUNTER — APPOINTMENT (OUTPATIENT)
Dept: BEHAVIORAL HEALTH | Facility: CLINIC | Age: 34
End: 2024-12-05
Payer: MEDICAID

## 2024-12-05 DIAGNOSIS — F31.9 BIPOLAR 1 DISORDER (MULTI): ICD-10-CM

## 2024-12-05 PROCEDURE — 90837 PSYTX W PT 60 MINUTES: CPT

## 2024-12-05 NOTE — PROGRESS NOTES
Therapy Session  Progress Note    Session Type: Virtual    Start Time: 2:00 pm  End Time: 2:55 pm    Appointment: Scheduled    Reason for Visit: Bipolar Disorder    Patient Symptoms/Interval History: Feeling calmer and less anxious. Reflecting a lot on her childhood and the experiences she went through as part of the [employer] community. Was raised to go along with her elders and not to speak up or show emotions. Identified that this has contributed to feelings of low self worth. Feels this impacts her current relationship in that she has periods when she doesn't speak or show emotions. Said she is used to being ignored in the home, so she is used to coping by shutting down. Has become aware of how this impacts her relationship with her boyfriend.    Mental Status: Alert & Oriented x3    Functional Status: No impairment    Interventions Provided: CBT, Develop Coping Skills    Progress Made: Moderate    Response to Interventions: The patient was receptive to the interventions provided.    Action Plan/Homework: The patient would like to try to be more aware of how her lack of communication is affecting her relationship with her boyfriend.     Treatment Plan: Use CBT to help the patient gain awareness of unhelpful thought patterns and core beliefs. Help the patient learn healthy communication skills.    Follow Up/Next Appointment: Follow up in 2 weeks.      MAYLIN Yanes, CHARLIE-S    **This visit was performed using real-time telehealth tools, including an audio/video connection between the patient and this provider. The patient provided consent for the individual telemedicine service and understands the limitations of the visit.

## 2024-12-26 ENCOUNTER — TELEPHONE (OUTPATIENT)
Dept: BEHAVIORAL HEALTH | Facility: CLINIC | Age: 34
End: 2024-12-26

## 2024-12-26 ENCOUNTER — APPOINTMENT (OUTPATIENT)
Dept: BEHAVIORAL HEALTH | Facility: CLINIC | Age: 34
End: 2024-12-26
Payer: MEDICAID

## 2024-12-31 ENCOUNTER — APPOINTMENT (OUTPATIENT)
Dept: PRIMARY CARE | Facility: CLINIC | Age: 34
End: 2024-12-31
Payer: MEDICAID

## 2025-01-02 ENCOUNTER — APPOINTMENT (OUTPATIENT)
Dept: BEHAVIORAL HEALTH | Facility: CLINIC | Age: 35
End: 2025-01-02
Payer: MEDICAID

## 2025-01-02 DIAGNOSIS — O99.343 BIPOLAR DISEASE DURING PREGNANCY IN THIRD TRIMESTER (MULTI): ICD-10-CM

## 2025-01-02 DIAGNOSIS — F31.9 BIPOLAR DISEASE DURING PREGNANCY IN THIRD TRIMESTER (MULTI): ICD-10-CM

## 2025-01-02 PROCEDURE — 99214 OFFICE O/P EST MOD 30 MIN: CPT | Performed by: CLINICAL NURSE SPECIALIST

## 2025-01-02 NOTE — PROGRESS NOTES
Subjective   Patient ID: Julisa Parnell is a 34 y.o. female who presents for follow up appt.   HPI  Review of Systems   All other systems reviewed and are negative.   Psych Review of Symptoms  Objective   Physical Exam  Psychiatric:         Attention and Perception: Attention and perception normal.         Mood and Affect: Mood is anxious. Affect is partially constricted d          Speech: Speech slowed           Behavior: Behavior fidgety           Thought Content: Thought content normal.         Cognition and Memory: Cognition and memory normal.   Assessment/Plan   IMP: 34 year old female diagnosed with Bipolar 1 Disorder and now 7 months pp after delivery of first child. Recent admissions to  for francia during postpartum course for December 2024 and 10/7/14.  Discussed resuming therapy, referral to [facility], and reduced work schedule.       INTERVAL: Now 7 month pp. Was referred to [facility] and was admitted x 7 days go due to hypomania, fractured wrist, flu. No discharge paperwork was received  able to be obtained via [facility] portal. Patient reports her Latuda was stopped. She was begun on INVEGA 9 mg po qam, VPA, does not know dosing and Vistrail PRN for anxiety. She plans to be followed by the  PHP starting 1/7/2025 for 9-12 Tu/ Thurs/Sat, and has sufficient medication until  that time where she will be followed by a prescriber.  Still plan to continue individual therapy Farrah Holman. Mood describes 'adjusting' to medication change and feel it is beneficial as slowing her down. Denies depression, mood lability, elevation of hypomania. 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  Reports benefited from group therapy, art therapy, carries sketch book helps with anxiety. Had accident at work, seeking Disability . Since last seen, she obtained  Section 8, food stamps,  vouchers, and receiving financial assistance. Both her boyfriend who is FOB and her mother help with childcare . Making decisions on her own behalf , feeling capable of achieving her goals.      Discussed adverse effect of VPA on future pregnancies and encouraged to speak w/ Hs provider of switch to LMTG for mood stability. Patient agrees to do asd reported positive response to LMTG in past.                Diagnoses   BPAD 1   Seven months pp   Single Status       Treatment    - Discontinue Latuda     - Discontinue Sertraline 50 mg every day  - Stop Trazodone 100mg po     - Cont medication management w/ HS PHP   -- Cont Invega 9 mg   -- Cont VPA for now and discuss switch to LMTG with her provider   -- Cont Vistaril PRN anxiety   - Refrain from ETOH use     RTC after completion of PHP       Contact provider via My Chart as needed

## 2025-01-09 ENCOUNTER — APPOINTMENT (OUTPATIENT)
Dept: BEHAVIORAL HEALTH | Facility: CLINIC | Age: 35
End: 2025-01-09
Payer: MEDICAID

## 2025-01-23 ENCOUNTER — APPOINTMENT (OUTPATIENT)
Dept: BEHAVIORAL HEALTH | Facility: CLINIC | Age: 35
End: 2025-01-23
Payer: MEDICAID

## 2025-01-23 DIAGNOSIS — F31.9 BIPOLAR 1 DISORDER (MULTI): ICD-10-CM

## 2025-01-23 PROCEDURE — 90837 PSYTX W PT 60 MINUTES: CPT

## 2025-01-23 NOTE — PSYCHOTHERAPY
Was inpatient over [place], had a panic attack  A lady fell cause equipment broke, very badly injured  She freaked out  12/21 to January 1, in a PHP    Mood - changed her medication, Vistaral, depakote, invega, Zoloft  More stable, not manic state   Father was in town, saw him twice, father came to visit  Emotional,   Doesn't care anymore, done people pleasing  Harmful to her  On good terms with father, text now, send pictures of the baby, wanted to open up communication  Wants to know that he's ok.     His first grandchild, wants him to have a relationship w/ granddaughter  Elsa  Learning to put herself first, a few close people manipulating her,     Learning coping skills, its helping  Trying to get mom to respect her being on her own    Mom and Kvng dee dee    Filed for unemployment, applied for assistance  Looking for low stress job now    Working smarter    Values - being a good mom, good partner, career (dreams), then relationship with God    Feels emotional tell me all this   A lot healthier  10-3 Monday-Friday  Has a folder, raul,   In the Meantime

## 2025-01-23 NOTE — PROGRESS NOTES
"Therapy Session  Progress Note    Session Type: Virtual    Start Time: 1:00 pm  End Time: 1:55 pm    Appointment: Scheduled    Reason for Visit: Bipolar Disorder    Patient Symptoms/Interval History: Currently attending a [facility] [place], was hospitalized previously over the holidays. Reported having a panic attack at work when some equipment broke which sent her into a manic episode. Said she was fired from her job due to this incident. Has tried to make peace with her father when he visited, said she saw him several times and things went well. Learning to set healthy boundaries for herself and to put herself first in order to stay healthy. Believes there is some \"role confusion\" between her and her boyfriend, said she doesn't want to be his mother. Would like to work on having better communication with him.     Mental Status: Alert & Oriented x3    Functional Status: No impairment    Interventions Provided: CBT, [place] Setting, Review Progress/Goals    Progress Made: Moderate    Response to Interventions: The patient was receptive to the interventions provided.    Action Plan/Homework: The patient would like to continue attending the PHP at [place]. She is collecting resources on coping skills.     Treatment Plan: Use CBT to help the patient gain awareness of unhelpful thought patterns and core beliefs. Help the patient learn healthy coping skills to manage bipolar disorder.     Follow Up/Next Appointment: Follow up in 2 weeks.      MAYLIN Yanes, CHARLIE-S    **This visit was performed using real-time telehealth tools, including an audio/video connection between the patient and this provider. The patient provided consent for the individual telemedicine service and understands the limitations of the visit.  "

## 2025-02-04 ENCOUNTER — APPOINTMENT (OUTPATIENT)
Dept: BEHAVIORAL HEALTH | Facility: CLINIC | Age: 35
End: 2025-02-04
Payer: MEDICAID

## 2025-02-13 ENCOUNTER — SOCIAL WORK (OUTPATIENT)
Dept: BEHAVIORAL HEALTH | Facility: CLINIC | Age: 35
End: 2025-02-13
Payer: MEDICAID

## 2025-02-13 ENCOUNTER — APPOINTMENT (OUTPATIENT)
Dept: BEHAVIORAL HEALTH | Facility: CLINIC | Age: 35
End: 2025-02-13
Payer: MEDICAID

## 2025-02-13 DIAGNOSIS — F31.9 BIPOLAR 1 DISORDER (MULTI): ICD-10-CM

## 2025-02-13 PROCEDURE — 99215 OFFICE O/P EST HI 40 MIN: CPT | Performed by: CLINICAL NURSE SPECIALIST

## 2025-02-13 PROCEDURE — 90837 PSYTX W PT 60 MINUTES: CPT

## 2025-02-13 NOTE — PROGRESS NOTES
Subjective   Patient ID: Julisa Parnell is a 35 y.o. female who presents for management of      HPI  Review of Systems   Psych Review of Symptoms  Objective   Physical Exam    Assessment/Plan   IMP: 35 year old female diagnosed with Bipolar 1 Disorder and now 7 months pp after delivery of first child. Recent admissions to  for francia during postpartum course for December 2024 and 10/7/14.  Discussed resuming therapy, referral to [facility], and reduced work schedule.       INTERVAL: Now 7 month pp. Was referred to [facility] and was admitted x 7 days go due to hypomania, fractured wrist, flu. No discharge paperwork was received  able to be obtained via [facility] portal. Patient reports her Latuda was stopped. She was begun on INVEGA 9 mg po qam, VPA, does not know dosing and Vistrail PRN for anxiety. She plans to be followed by the  PHP starting 1/7/2025 for 9-12 Tu/ Thurs/Sat, and has sufficient medication until  that time where she will be followed by a prescriber.  Still plan to continue individual therapy Farrah Holman. Mood describes 'adjusting' to medication change and feel it is beneficial as slowing her down. Denies depression, mood lability, elevation of hypomania. 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  Reports benefited from group therapy, art therapy, carries sketch book helps with anxiety. Had accident at work, seeking Disability  Since last seen, she obtained Section 8, food stamps,  vouchers, and receiving financial assistance. Both her boyfriend who is FOB and her mother help with childcare . Making decisions on her own behalf , feeling capable of achieving her goals.  Discussed adverse effect of VPA on future pregnancies and encouraged to speak w/ provider of switch to LMTG for mood stability. Patient agrees to do asd  reported positive response to LMTG in past.       INTERVAL: Now 9 months pp. Reports completed [facility] 2 weeks ago on 2025. Was to continue in their IOP but pt unable due to transportation. Has help from her mother 3 days per week and boyfriend.   Living alone with baby. Applying for PT Disability, wants to be self employed. Discussed change of mood stabilizer from VPA 500mg to LTMG target of 200mg due to lack of reproductive safety of VPA as patient plans  to have another pregnancy in remote future.  Working from home, struggling with boundaries,  want to develop a business,. Mood sometime 'so unhappy,' depressed, sadness when away from baby, feels guilt when accepts help from others, ie mother. FOB, and feeling obligated to provide assist to their businesses in addition to her own as well as care for baby. Denies SI/HI. When she is working unsure reason occurs when she is doing some work, sudden feel guilty not working,   PHP enjoyed, seeing therapist. Sleeping 6 hours at night, finds it hard to relax, worried, stressed . Denies mood elevation, francia, psychosis.     Discussed recommendation to   IOP , stopping VPA and will plan to switch to titration of LMTG for mood stability. She enjoyed being in PHP and motivated to enroll in IOP.        Reviewed case w/ CP, Dr. Chirag Palma on 2025. Recommend stop VPA and agrees with switch to LMTG and referral to IOP.       Diagnoses   BPAD 1   Nine months pp   Single Status       Treatment    - Refer to   IOP completed    - Continue Sertraline 50 mg every day sufficient supply   - Cont Invega 9 mg renewed   --Stop VPA 500mg po at bedtime   - Discussed switch to LMTG after off of VPA    --Cont Vistaril PRN anxiety   -  Refrain from ETOH use      RTC after completion of  IOP       - Contact provider via My Chart as needed

## 2025-02-13 NOTE — PROGRESS NOTES
Therapy Session  Progress Note    Session Type: Virtual    Start Time: 11:00 am  End Time: 11:55 am    Appointment: Scheduled    Reason for Visit: Bipolar Disorder    Patient Symptoms/Interval History: Feeling upset and angry since her mother didn't acknowledge her birthday. Acknowledges that her mother has certain [employer] beliefs that affect her behaviors, but the patient is upset that her mother doesn't respect her own beliefs. [place] happy that her father called her on her birthday, identified that relationship means a lot to her and is a trigger for her when things aren't going well. Finished the PHP at [facility] and got a lot out of the program. Has realized the need to focus on her own self-care.     Mental Status: Alert & Oriented x3    Functional Status: No impairment    Interventions Provided: CBT, Develop Coping Skills    Progress Made: Moderate    Response to Interventions: The patient was receptive to the interventions provided.    Action Plan/Homework: The patient would like to observe that her mother's behaviors are reflection of her own self and not the patient's value.    Treatment Plan: Use CBT to help the patient gain awareness of unhelpful thought patterns and core beliefs. Help the patient learn healthy coping skills to manage symptoms of bipolar disorder.     Follow Up/Next Appointment: Follow up in 1 week.      MAYLIN Yanes, CHARLIE-S    **This visit was performed using real-time telehealth tools, including an audio/video connection between the patient and this provider. The patient provided consent for the individual telemedicine service and understands the limitations of the visit.

## 2025-02-14 RX ORDER — PALIPERIDONE 9 MG/1
9 TABLET, EXTENDED RELEASE ORAL EVERY MORNING
Qty: 30 TABLET | Refills: 0 | Status: SHIPPED | OUTPATIENT
Start: 2025-02-14 | End: 2025-03-16

## 2025-02-20 ENCOUNTER — SOCIAL WORK (OUTPATIENT)
Dept: BEHAVIORAL HEALTH | Facility: CLINIC | Age: 35
End: 2025-02-20
Payer: MEDICAID

## 2025-02-20 ENCOUNTER — APPOINTMENT (OUTPATIENT)
Dept: BEHAVIORAL HEALTH | Facility: CLINIC | Age: 35
End: 2025-02-20
Payer: MEDICAID

## 2025-02-20 DIAGNOSIS — F31.9 BIPOLAR 1 DISORDER (MULTI): ICD-10-CM

## 2025-02-20 PROCEDURE — 90837 PSYTX W PT 60 MINUTES: CPT

## 2025-02-20 PROCEDURE — 90791 PSYCH DIAGNOSTIC EVALUATION: CPT | Mod: AJ,GT,95
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
## 2025-02-20 NOTE — PROGRESS NOTES
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 WITHOUT method, intent, plan or behavior (C-SSRS Suicidal Ideation #1 or #2)   Or  ??  Modifiable risk factors and strong protective factors  Or  ?  No reported history of Suicidal Ideation or Behavior Discretionary Outpatient Referral     Step 5: Documentation   Risk Level :  [ ] High Suicide Risk  [ ] Moderate Suicide Risk  [ ] Low Suicide Risk   Clinical Note:    Your Clinical Observation  Relevant Mental Status Information  Methods of Suicide Risk Evaluation    Brief Evaluation Summary  Warning Signs  Risk Indicators  Protective Factors  Access to Lethal Means  Collateral Sources Used and Relevant Information Obtained  Specific Assessment Data to Support Risk Determination  Rationale for Actions Taken and Not Taken    Provision of Crisis Line [phone]([id number])  Implementation of Safety Plan (If Applicable)

## 2025-02-21 ENCOUNTER — TELEMEDICINE (OUTPATIENT)
Dept: BEHAVIORAL HEALTH | Facility: CLINIC | Age: 35
End: 2025-02-21
Payer: MEDICAID

## 2025-02-21 DIAGNOSIS — F31.9 BIPOLAR 1 DISORDER (MULTI): ICD-10-CM

## 2025-02-21 DIAGNOSIS — F41.9 ANXIETY: ICD-10-CM

## 2025-02-21 RX ORDER — LITHIUM CARBONATE 300 MG/1
300 TABLET, FILM COATED, EXTENDED RELEASE ORAL 2 TIMES DAILY
Qty: 60 TABLET | Refills: 0 | Status: SHIPPED | OUTPATIENT
Start: 2025-02-21 | End: 2025-03-23

## 2025-02-21 NOTE — PROGRESS NOTES
Patient ID: Julisa Parnell is a 35 y.o. female who presents for Follow-up and Med Management ( IOP ).    History of Present Illness   35 year old female diagnosed with Bipolar 1 Disorder and now 7 months pp after delivery of first child. Recent admissions to  for francia during postpartum course for 2024 and 10/7/14. She also recently completed PHP/IOP at [facility].    During hospitalization in 2024, Latuda was stopped and she was started on Invega and then later Depakote. Pt likes the Invega and thinks it has been helpful. However only taking Depakote 2/7 days due to side effects (too sedating for patient).    Pt reports that she was diagnosed with bipolar disorder type 1 years ago but that in past has struggled mostly with breakthrough manic/hypomanic episodes. She has been depressed before but never to this extent.     Reports feeling very down most days. Appetite is also down which has lead to quite a bit of weight loss. Low energy and motivation. Very hard to get out of bed, daily routines. Anxiety is very high, afraid to even leave the house with baby. Denies SI. Sleep has not been an issue.     Has been having panic attacks, several a week when in public.     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     Taking Vistaril 1-2 times per day which has been a little helpful, takes the edge off.   Denies current SI/HI/AVH    Psychiatric History:  Numerous lifetime hospitalization for francia/hypomania, most recent in 2024  Reports having tried many medications in past including Geodon (too sedating), Latuda (had breakthough francia), lithium (does not recall response), VPA (too sedating)     Social History:  Lives with partner/FOB  First baby     Family History:  Did not obtain     ROS  Psych - see HPI  Other systems reviewed and negative unless otherwise specified     Mental Status  "Exam  Appearance: Neatly dressed, well groomed.  Attitude: cooperative  Behavior: Good eye contact  Motor Activity: No agitation or retardation. Normal gait.  Speech: Normal volume, rate, and rhythm, and tone. Spontaneous and fluent.  Mood: \"not so good\"  Affect: anxious, labile   Thought Process: Organized, logical, coherent. No looseness of associations or circumstantiality.  Thought Content: Does not endorse suicidal or homicidal ideation, no delusions elicited.  Thought Perception: Does not endorse auditory or visual hallucinations, does not appear to be responding to hallucinatory stimuli.  Cognition: Awake, alert, oriented x 3. Attention is fair.   Insight: good  Judgment: good    Lab Review:   No visits with results within 2 Month(s) from this visit.   Latest known visit with results is:   Procedure Visit on 11/12/2024   Component Date Value    Preg Test, Ur 11/12/2024 Negative     Neisseria gonorrhea,Ampl* 11/12/2024 Negative     Chlamydia trachomatis, A* 11/12/2024 Negative     Trichomonas Vaginalis 11/12/2024 Negative        Assessment/Plan  Assessment:  34 yo with Bipolar disorder type 1, PP 7 months with first child, currently meets criteria for PPD. Patient has had numerous lifetime hospitalizations for francia/hypomania, most recent in 12/2024. She is currently presenting with moderate to severe depression characterized by low motivation, depressed mood, decreased appetite/weight loss, and social isolation. Anxiety is very high and she has been experiencing panic attacks when she leaves the house and when in crowds. She reports having experienced more francia episodes compared to depressive episodes over the year. 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     Pt currently had medications changed when inpatient in 12/2024; Latuda was stopped and she was started on Invega and subtherapeutic dose of VPA; she has not been adherent with the latter due to side effects namely sedation.     Discussed options " with patient. Given francia has been predominate presentation, Lithium likely to be better option compared to Lamictal. Also has advantage of faster onset of action and some efficacy in treating acute depression.     Had recent thyroid studies which were WNL. Serum creatinine in 12/2024 0.81 and no known history of kidney disease.     Impression:  Bipolar disorder type 1, current episode depressed   Social anxiety; r/o SHIRAZ     Plan:   - Start Lithium  mg BID. Will obtain labs in 7-10 days to check serum levels and monitor Cr.   - Continue Invega 9 am QAM   - Continue hydroxyzine 25 mg BID PRN anxiety  - Continue Zoloft 50 mg daily for now to target anxiety and depression. However given patient's history of SSRI induced francia and reservations about being on SSRI, may consider changing to Buspirone at next appointment.   - Appropriate to start IOP     FU in 1 week, sooner if needed.

## 2025-02-23 NOTE — PROGRESS NOTES
Subjective   Patient ID: Juilsa Parnell is a 35 y.o. female who presents for management of      HPI  Review of Systems   Psych Review of Symptoms  Objective   Physical Exam    Assessment/Plan   IMP: 35 year old female diagnosed with Bipolar 1 Disorder and now 7 months pp after delivery of first child. Recent admissions to  for francia during postpartum course for December 2024 and 10/7/14.  Discussed resuming therapy, referral to [facility], and reduced work schedule.       INTERVAL: Now 7 month pp. Was referred to [facility] and was admitted x 7 days go due to hypomania, fractured wrist, flu. No discharge paperwork was received  able to be obtained via [employer] portal. Patient reports her Latuda was stopped. She was begun on INVEGA 9 mg po qam, VPA, does not know dosing and Vistrail PRN for anxiety. She plans to be followed by the  PHP starting 1/7/2025 for 9-12 Tu/ Thurs/Sat, and has sufficient medication until  that time where she will be followed by a prescriber.  Still plan to continue individual therapy Farrah Holman. Mood describes 'adjusting' to medication change and feel it is beneficial as slowing her down. Denies depression, mood lability, elevation of hypomania. 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  Reports benefited from group therapy, art therapy, carries sketch book helps with anxiety. Had accident at work, seeking Disability  Since last seen, she obtained Section 8, food stamps,  vouchers, and receiving financial assistance. Both her boyfriend who is FOB and her mother help with childcare . Making decisions on her own behalf , feeling capable of achieving her goals.  Discussed adverse effect of VPA on future pregnancies and encouraged to speak w/ provider of switch to LMTG for mood stability. Patient agrees to do asd  reported positive response to LMTG in past.       INTERVAL: Now 9 months pp. Reports completed [facility] 2 weeks ago on 2025. Was to continue in their IOP but pt unable due to transportation. Has help from her mother 3 days per week and boyfriend.   Living alone with baby. Applying for PT Disability, wants to be self employed. Discussed change of mood stabilizer from VPA 500mg to LTMG target of 200mg due to lack of reproductive safety of VPA as patient plans  to have another pregnancy in remote future.  Working from home, struggling with boundaries,  want to develop a business,. Mood sometime 'so unhappy,' depressed, sadness when away from baby, feels guilt when accepts help from others, ie mother. FOB, and feeling obligated to provide assist to their businesses in addition to her own as well as care for baby. Denies SI/HI. When she is working unsure reason occurs when she is doing some work, sudden feel guilty not working,   PHP enjoyed, seeing therapist. Sleeping 6 hours at night, finds it hard to relax, worried, stressed . Denies mood elevation, francia, psychosis.     Discussed recommendation to   IOP , stopping VPA and will plan to switch to titration of LMTG for mood stability. She enjoyed being in PHP and motivated to enroll in IOP.        Reviewed case w/ CP, Dr. Chirag Palma on 2025. Recommend stop VPA and agrees with switch to LMTG and referral to IOP.       Diagnoses   BPAD 1   Nine months pp   Single Status       Treatment    - Refer to   IOP completed    - Continue Sertraline 50 mg every day sufficient supply   - Cont Invega 9 mg renewed   --Stop VPA 500mg po at bedtime   - Discussed switch to LMTG after off of VPA    --Cont Vistaril PRN anxiety   -  Refrain from ETOH use      RTC after completion of  IOP       - Contact provider via My Chart as needed

## 2025-02-24 ENCOUNTER — APPOINTMENT (OUTPATIENT)
Dept: BEHAVIORAL HEALTH | Facility: CLINIC | Age: 35
End: 2025-02-24
Payer: MEDICAID

## 2025-02-25 ENCOUNTER — CLINICAL SUPPORT (OUTPATIENT)
Dept: BEHAVIORAL HEALTH | Facility: CLINIC | Age: 35
End: 2025-02-25
Payer: MEDICAID

## 2025-02-25 ENCOUNTER — APPOINTMENT (OUTPATIENT)
Dept: BEHAVIORAL HEALTH | Facility: CLINIC | Age: 35
End: 2025-02-25
Payer: MEDICAID

## 2025-02-25 DIAGNOSIS — F41.9 ANXIETY: ICD-10-CM

## 2025-02-25 DIAGNOSIS — F31.9 BIPOLAR 1 DISORDER (MULTI): ICD-10-CM

## 2025-02-25 PROCEDURE — 90837 PSYTX W PT 60 MINUTES: CPT

## 2025-02-25 PROCEDURE — 90853 GROUP PSYCHOTHERAPY: CPT | Mod: AJ,HE,GT | Performed by: SOCIAL WORKER

## 2025-02-25 NOTE — GROUP NOTE
Group Topic: Coping Skills   Group Date: 2/25/2025  Start Time: 10:00 AM  End Time: 11:00 AM  Facilitators: JOE Diggs   Department: [facility]        Name: Julisa Deedee Fowler Parmjit YOB: 1990   MR: [id number]      This was a video IOP group on a HIPAA compliant platform. All patients were provided with informed consent.  Date: 02/25/25, Time: 10am-11am group, Number of Patients:10, User Name: JOE [email or web address]  Number of Staff: 1    Group topic(s): Gratitude   Provided psychoeducation on gratitude and the benefits for mental health. Discussed barriers and ways to practice including journaling and expressing appreciation to a friend. Facilitated gratitude exercise with group members. Julisa was present attentive.     Facilitator: JOE Waters

## 2025-02-27 ENCOUNTER — CLINICAL SUPPORT (OUTPATIENT)
Dept: BEHAVIORAL HEALTH | Facility: CLINIC | Age: 35
End: 2025-02-27
Payer: MEDICAID

## 2025-02-27 DIAGNOSIS — F31.9 BIPOLAR 1 DISORDER (MULTI): ICD-10-CM

## 2025-02-27 PROCEDURE — 90853 GROUP PSYCHOTHERAPY: CPT | Mod: AJ,HE,GT | Performed by: SOCIAL WORKER

## 2025-02-27 NOTE — GROUP NOTE
Group Topic: Goals   Group Date: 2025  Start Time: 12:00 PM  End Time:  1:00 PM  Facilitators: CHARLIE George   Department: [facility]    Number of Participants: 9   Group Focus: coping skills and goals  Treatment Modality: Cognitive Behavioral Therapy and Solution-Focused Therapy  Interventions utilized were group exercise, problem solving, and support  Purpose: coping skills and self-care    This was a video IOP group on a HIPAA compliant platform. All patients were provided with informed consent.     Date: 2025, Time: 12p-1p, Number of Patients: 9, User Name: [email or web address],  Number of Staff: 1  Group topic(s): Weekend goal setting.   Patients set goals using the SMART framework surrounding enjoyment, achievement and closeness to others. Patients also identified times they anticipate being vulnerable over the weekend and how they will cope.  CHARLIE Hall-S      Name: Julisa Parnell YOB: 1990   MR: [id number]       Pt shared about plans to attend a  for a close friend's family member, who pt was also close to. Pt was tearful and anxious when discussing the plans. Shared feeling worried about being dysregulated emotionally at the . Pt identified possible coping strategies/solutions with group including attending an AA meeting, shortening how long she is at the , and using DBT TIPP skills.

## 2025-02-27 NOTE — PROGRESS NOTES
Therapy Session  Progress Note    Session Type: Virtual    Start Time: 11:00 am  End Time: 11:55 am    Appointment: Scheduled    Reason for Visit: Anxiety, Bipolar Disorder    Patient Symptoms/Interval History: Feeling anxious and overwhelmed. Started the  IOP which she is happy about. Enjoying being a mother but finds it difficult emotionally. Feels discouraged by her family situation, identified her mother and her boyfriend ([employer]) as her primary supports. Getting pressured by family to get  but is determined to make her own decisions. 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 Believes she has the confidence to be successful if she chooses to do so. Coping by using a gratitude journal and reading Advent materials.     Mental Status: Alert & Oriented x3    Functional Status: No impairment    Interventions Provided: CBT, Develop Coping Skills    Progress Made: Moderate    Response to Interventions: The patient was receptive to the interventions provided.    Action Plan/Homework: The patient plans to attend the  Intensive Outpatient Program and to learn as much as she can.     Treatment Plan: Use CBT to help the patient gain awareness of unhelpful thought patterns and core beliefs. Help the patient learn healthy coping skills to manage anxiety and bipolar disorder.     Follow Up/Next Appointment: Follow up in 1 week.      MAYLIN Yanes, CHARLIE-S    **This visit was performed using real-time telehealth tools, including an audio/video connection between the patient and this provider. The patient provided consent for the individual telemedicine service and understands the limitations of the visit.

## 2025-02-28 ENCOUNTER — DOCUMENTATION (OUTPATIENT)
Dept: BEHAVIORAL HEALTH | Facility: CLINIC | Age: 35
End: 2025-02-28
Payer: MEDICAID

## 2025-02-28 NOTE — PROGRESS NOTES
INTENSIVE OUTPATIENT PROGRAM MULTIDISCIPLINARY  TREATMENT PLAN & PROGRESS NOTE     Patient: Julisa Parnell  :1990 Age: 35    Student: No  Treatment Team Date:2025    MRN#[id number]    Psychiatrist: Dr. Chirag Palma  Date of IOP Admission: 2025  Ref by: Chen Holman           Week  1                     Admission Scores: Did not complete  GLENROY 21:  MY:  BDI:   PHQ-9:  MAST: DAST: MDQ:   Bells:     Current Risk Assessment:  Suicidal Risk:      None: X    Ideation:       Plan:      Intent:      SI Plan with plan contracts for safety:     Hx of harming self:        Homicidal Risk:  None:  X Ideation:       Plan:      Intent:      HI Plan with means:                                     Hx of harming others:          Global Improvement    Clinical Global Impressions Rating Scale Of Illness:  4  1-No Illness Present   2-Minimal   3-Mild   4-Moderate   5-Marked   6-Severe  X 7-Very Severe     Diagnoses & ICD-10 Codes F53.0  Primary Diagnosis & Code: Postpartum depression     Secondary Diagnosis & Code:   Psychosocial & Environmental Stressors:   Financial  insecurity :   Medication  Family/Home life: X  Work/School:X    Inadequacy of social support      Patient's Treatment Goals:            Date Initiated:            Progress Update:  25  1.  Attend and actively participate in IOP _3_ days/week for 3 hours per day   2025 Good  X Fair    Poor   Unclear   2.  Attend weekly medication management sessions and maintain compliance of medications  2025   Good  X  Fair     Poor     Unclear                                                        2.  Identify symptoms of diagnoses and develop coping plans    2025  Good X   Fair     Poor     Unclear                                                 3.  Increase illness education and symptom insight                  2025 Good  X  Fair     Poor     Unclear        Current medications:  See EMR chart        Progress  note:  _X_New to the IOP program, attending as planned  __Compliant, Progressing & Improving - Needs more time in IOP therapy program   __Compliant, Progressing & Improving Planning Discharge   __Compliant, Not Progressing or Improving: Reason  __Non-Compliant, not progressing or improving: Plan  __Other: Patient states that it's been helpful to connect with other women through IOP.     Insurance & Benefits: Name of Insurance: [employer] Medicaid ,IN NETWORK, Yes    BENEFITS ARE BASED ON MEDICAL NECESSITY ONLY: Unlimited visits, covers 100 % of the contracted rate after deductible has been met.     Co-Pay per day: $0    DEDUCTIBLE        Single:  / Family: / Accumulation:   Single:  /  Family: /   CO- INSURANCE  Single:  / Family:  / Accumulation:  Single:  /  Family: /    OUT OF POCKET  Single:  / Family:  / Accumulation:  Single: /  Family: /       Total IOP Sessions completed & authorized to date:  2    Discharge plans have been discussed with patient & Dr. SAULO Palma on:   Reason for discharge:    ___Successfully completed IOP treatment:   ___Pt. decided to seek treatment elsewhere:   ___Dropped out or no show more than three times:  ___Benefits exhausted:   ___Other:    Discharge date:                   Discharge Prognosis: Excellent      Good     Fair     Poor    Follow up appointment with: Physician:   Follow up appointment with: Therapist:    Follow up Aftercare group?  Yes __   No__     Patient provided with Crisis Hotline phone number for suicide prevention? Yes __ No __    Discharge Scores:   GLENROY 21:  MY:  BDI:   PHQ-9:  MAST: DAST: MDQ:   [place]:    Treatment plan electronically signed by Treatment Team:   SAWYER Palma MD, JOE Blackwell J. Wyse, LISW, K. Fogarty, PsyD

## 2025-02-28 NOTE — GROUP NOTE
Group Topic: Coping Skills   Group Date: 2025  Start Time: 10:00 AM  End Time: 11:00 AM  Facilitators: Julia Austin PsyD   Department: [facility]    Number of Participants: 10   Group Focus: feeling awareness/expression and relapse prevention  Treatment Modality: Cognitive Behavioral Therapy  Interventions utilized were exploration and patient education  Purpose: relapse prevention strategies and other: warning signs    This was a video IOP group on a HIPAA compliant platform. All patients were provided with informed consent.    Group topic: Symptom Identification   Group members received education on symptoms of  mood and anxiety disorders. They identified their own symptoms and discussed how their symptoms change from distress to baseline.   Julia Austin Psy.D.        Name: Julisa Parnell YOB: 1990   MR: [id number]      Julisa was present and actively engaged in discussion. She shared that she has struggled with going to the grocery store postpartum due to anxiety. She noted that this anxiety is mostly related to finances and spending money. She shared downward spiral of thoughts.

## 2025-02-28 NOTE — GROUP NOTE
Group Topic: Coping Skills   Group Date: 2/25/2025  Start Time: 12:00 PM  End Time:  1:00 PM  Facilitators: CHARLIE George   Department: [facility]    Number of Participants: 11   Group Focus: communication  Treatment Modality: Dialectical Behavioral Therapy  Interventions utilized were group exercise and patient education  Purpose: communication skills    This was a video IOP group on a HIPAA compliant platform. All patients were provided with informed consent.     Date: 2/25/2025, Time: 12p-1p, Number of Patients: 11, User Name: [email or web address],  Number of Staff: 2  Group topic(s): DBT interpersonal effectiveness skills. Patients were introduced to DBT skills DEAR MAN and GIVE. Patients discussed situations in which they planned to utilize the skills.    JOE Hall LISW-S      Name: Julisa Parnell YOB: 1990   MR: [id number]      Pt had left group at 11 to meet with her individual therapist and was not present. She returned at 12. She was on and off camera and was attentive when on camera.

## 2025-02-28 NOTE — GROUP NOTE
Group Topic: Coping Skills   Group Date: 2/27/2025  Start Time: 11:00 AM  End Time: 12:00 PM  Facilitators: Julia Austin PsyD   Department: [facility]    Number of Participants: 10   Group Focus: other warning signs and relapse prevention  Treatment Modality: Cognitive Behavioral Therapy  Interventions utilized were exploration and patient education  Purpose: relapse prevention strategies and other: warning signs    This was a video IOP group on a HIPAA compliant platform. All patients were provided with informed consent.    Group topic: Red and Green Flags  Group members explored red (warning signs) and green (signs they are becoming well) flags. They explored ways to catch early warning signs to becoming unwell and spent time identifying what baseline means to them.   Julia Austin Psy.D.        Name: Julisa Parnell YOB: 1990   MR: [id number]      Julisa was present and actively listened to discussion. She was observed turning her camera on and off. She reported that she did not want to share her red flags.

## 2025-03-03 ENCOUNTER — HOSPITAL ENCOUNTER (OUTPATIENT)
Dept: CARDIOLOGY | Facility: HOSPITAL | Age: 35
Discharge: HOME | End: 2025-03-03
Payer: MEDICAID

## 2025-03-03 ENCOUNTER — HOSPITAL ENCOUNTER (EMERGENCY)
Facility: HOSPITAL | Age: 35
Discharge: HOME | End: 2025-03-03
Attending: EMERGENCY MEDICINE
Payer: MEDICAID

## 2025-03-03 ENCOUNTER — APPOINTMENT (OUTPATIENT)
Dept: BEHAVIORAL HEALTH | Facility: CLINIC | Age: 35
End: 2025-03-03
Payer: MEDICAID

## 2025-03-03 VITALS
DIASTOLIC BLOOD PRESSURE: 86 MMHG | OXYGEN SATURATION: 98 % | RESPIRATION RATE: 20 BRPM | TEMPERATURE: 98.3 F | SYSTOLIC BLOOD PRESSURE: 125 MMHG | HEART RATE: 120 BPM

## 2025-03-03 DIAGNOSIS — F31.9 BIPOLAR AFFECTIVE DISORDER, REMISSION STATUS UNSPECIFIED (MULTI): Primary | ICD-10-CM

## 2025-03-03 LAB
ALBUMIN SERPL BCP-MCNC: 4.9 G/DL (ref 3.4–5)
ALP SERPL-CCNC: 76 U/L (ref 33–110)
ALT SERPL W P-5'-P-CCNC: 27 U/L (ref 7–45)
ANION GAP SERPL CALC-SCNC: 14 MMOL/L (ref 10–20)
APAP SERPL-MCNC: <10 UG/ML
APPEARANCE UR: CLEAR
AST SERPL W P-5'-P-CCNC: 42 U/L (ref 9–39)
B-HCG SERPL-ACNC: <2 MIU/ML
BASOPHILS # BLD AUTO: 0.05 X10*3/UL (ref 0–0.1)
BASOPHILS NFR BLD AUTO: 0.5 %
BILIRUB DIRECT SERPL-MCNC: 0.1 MG/DL (ref 0–0.3)
BILIRUB SERPL-MCNC: 0.6 MG/DL (ref 0–1.2)
BILIRUB UR STRIP.AUTO-MCNC: NEGATIVE MG/DL
BUN SERPL-MCNC: 18 MG/DL (ref 6–23)
CALCIUM SERPL-MCNC: 10 MG/DL (ref 8.6–10.3)
CHLORIDE SERPL-SCNC: 103 MMOL/L (ref 98–107)
CO2 SERPL-SCNC: 24 MMOL/L (ref 21–32)
COLOR UR: COLORLESS
CREAT SERPL-MCNC: 1 MG/DL (ref 0.5–1.05)
EGFRCR SERPLBLD CKD-EPI 2021: 76 ML/MIN/1.73M*2
EOSINOPHIL # BLD AUTO: 0.09 X10*3/UL (ref 0–0.7)
EOSINOPHIL NFR BLD AUTO: 0.8 %
ERYTHROCYTE [DISTWIDTH] IN BLOOD BY AUTOMATED COUNT: 13.1 % (ref 11.5–14.5)
ETHANOL SERPL-MCNC: <10 MG/DL
GLUCOSE SERPL-MCNC: 100 MG/DL (ref 74–99)
GLUCOSE UR STRIP.AUTO-MCNC: NORMAL MG/DL
HCT VFR BLD AUTO: 36.3 % (ref 36–46)
HGB BLD-MCNC: 12.6 G/DL (ref 12–16)
IMM GRANULOCYTES # BLD AUTO: 0.03 X10*3/UL (ref 0–0.7)
IMM GRANULOCYTES NFR BLD AUTO: 0.3 % (ref 0–0.9)
KETONES UR STRIP.AUTO-MCNC: NEGATIVE MG/DL
LEUKOCYTE ESTERASE UR QL STRIP.AUTO: NEGATIVE
LYMPHOCYTES # BLD AUTO: 1.54 X10*3/UL (ref 1.2–4.8)
LYMPHOCYTES NFR BLD AUTO: 14 %
MCH RBC QN AUTO: 29.6 PG (ref 26–34)
MCHC RBC AUTO-ENTMCNC: 34.7 G/DL (ref 32–36)
MCV RBC AUTO: 85 FL (ref 80–100)
MONOCYTES # BLD AUTO: 1.19 X10*3/UL (ref 0.1–1)
MONOCYTES NFR BLD AUTO: 10.8 %
NEUTROPHILS # BLD AUTO: 8.08 X10*3/UL (ref 1.2–7.7)
NEUTROPHILS NFR BLD AUTO: 73.6 %
NITRITE UR QL STRIP.AUTO: NEGATIVE
NRBC BLD-RTO: 0 /100 WBCS (ref 0–0)
PH UR STRIP.AUTO: 7 [PH]
PLATELET # BLD AUTO: 282 X10*3/UL (ref 150–450)
POTASSIUM SERPL-SCNC: 3.9 MMOL/L (ref 3.5–5.3)
PROT SERPL-MCNC: 7.6 G/DL (ref 6.4–8.2)
PROT UR STRIP.AUTO-MCNC: NEGATIVE MG/DL
RBC # BLD AUTO: 4.26 X10*6/UL (ref 4–5.2)
RBC # UR STRIP.AUTO: ABNORMAL MG/DL
RBC #/AREA URNS AUTO: NORMAL /HPF
SALICYLATES SERPL-MCNC: <3 MG/DL
SODIUM SERPL-SCNC: 137 MMOL/L (ref 136–145)
SP GR UR STRIP.AUTO: 1
SQUAMOUS #/AREA URNS AUTO: NORMAL /HPF
UROBILINOGEN UR STRIP.AUTO-MCNC: NORMAL MG/DL
WBC # BLD AUTO: 11 X10*3/UL (ref 4.4–11.3)
WBC #/AREA URNS AUTO: NORMAL /HPF

## 2025-03-03 PROCEDURE — 82248 BILIRUBIN DIRECT: CPT | Performed by: EMERGENCY MEDICINE

## 2025-03-03 PROCEDURE — 93005 ELECTROCARDIOGRAM TRACING: CPT

## 2025-03-03 PROCEDURE — 36415 COLL VENOUS BLD VENIPUNCTURE: CPT | Performed by: EMERGENCY MEDICINE

## 2025-03-03 PROCEDURE — 80143 DRUG ASSAY ACETAMINOPHEN: CPT | Performed by: EMERGENCY MEDICINE

## 2025-03-03 PROCEDURE — 85025 COMPLETE CBC W/AUTO DIFF WBC: CPT | Performed by: EMERGENCY MEDICINE

## 2025-03-03 PROCEDURE — 96372 THER/PROPH/DIAG INJ SC/IM: CPT | Performed by: EMERGENCY MEDICINE

## 2025-03-03 PROCEDURE — 2500000004 HC RX 250 GENERAL PHARMACY W/ HCPCS (ALT 636 FOR OP/ED): Performed by: EMERGENCY MEDICINE

## 2025-03-03 PROCEDURE — 80048 BASIC METABOLIC PNL TOTAL CA: CPT | Performed by: EMERGENCY MEDICINE

## 2025-03-03 PROCEDURE — 2500000001 HC RX 250 WO HCPCS SELF ADMINISTERED DRUGS (ALT 637 FOR MEDICARE OP): Performed by: EMERGENCY MEDICINE

## 2025-03-03 PROCEDURE — 84702 CHORIONIC GONADOTROPIN TEST: CPT | Performed by: EMERGENCY MEDICINE

## 2025-03-03 PROCEDURE — 99284 EMERGENCY DEPT VISIT MOD MDM: CPT | Performed by: EMERGENCY MEDICINE

## 2025-03-03 PROCEDURE — 81001 URINALYSIS AUTO W/SCOPE: CPT | Performed by: EMERGENCY MEDICINE

## 2025-03-03 RX ORDER — OLANZAPINE 10 MG/2ML
10 INJECTION, POWDER, FOR SOLUTION INTRAMUSCULAR ONCE
Status: COMPLETED | OUTPATIENT
Start: 2025-03-03 | End: 2025-03-03

## 2025-03-03 RX ORDER — IBUPROFEN 600 MG/1
600 TABLET ORAL ONCE
Status: COMPLETED | OUTPATIENT
Start: 2025-03-03 | End: 2025-03-03

## 2025-03-03 RX ADMIN — OLANZAPINE 10 MG: 10 INJECTION, POWDER, FOR SOLUTION INTRAMUSCULAR at 06:53

## 2025-03-03 RX ADMIN — IBUPROFEN 600 MG: 600 TABLET, FILM COATED ORAL at 09:09

## 2025-03-03 SDOH — HEALTH STABILITY: MENTAL HEALTH: ARE YOU HAVING THOUGHTS OF KILLING YOURSELF RIGHT NOW?: NO

## 2025-03-03 SDOH — HEALTH STABILITY: MENTAL HEALTH: IN THE PAST WEEK, HAVE YOU BEEN HAVING THOUGHTS ABOUT KILLING YOURSELF?: NO

## 2025-03-03 SDOH — HEALTH STABILITY: MENTAL HEALTH: DEPRESSION SYMPTOMS: NO PROBLEMS REPORTED OR OBSERVED.

## 2025-03-03 SDOH — HEALTH STABILITY: MENTAL HEALTH: SUICIDAL BEHAVIOR (LIFETIME): NO

## 2025-03-03 SDOH — HEALTH STABILITY: MENTAL HEALTH: WISH TO BE DEAD (PAST 1 MONTH): NO

## 2025-03-03 SDOH — ECONOMIC STABILITY: HOUSING INSECURITY: FEELS SAFE LIVING IN HOME: YES

## 2025-03-03 SDOH — HEALTH STABILITY: MENTAL HEALTH: ANXIETY SYMPTOMS: GENERALIZED

## 2025-03-03 SDOH — HEALTH STABILITY: MENTAL HEALTH: IN THE PAST FEW WEEKS, HAVE YOU FELT THAT YOU OR YOUR FAMILY WOULD BE BETTER OFF IF YOU WERE DEAD?: NO

## 2025-03-03 SDOH — HEALTH STABILITY: MENTAL HEALTH: NON-SPECIFIC ACTIVE SUICIDAL THOUGHTS (PAST 1 MONTH): NO

## 2025-03-03 SDOH — HEALTH STABILITY: MENTAL HEALTH: HAVE YOU EVER TRIED TO KILL YOURSELF?: NO

## 2025-03-03 SDOH — HEALTH STABILITY: MENTAL HEALTH: IN THE PAST FEW WEEKS, HAVE YOU WISHED YOU WERE DEAD?: NO

## 2025-03-03 ASSESSMENT — COLUMBIA-SUICIDE SEVERITY RATING SCALE - C-SSRS
2. HAVE YOU ACTUALLY HAD ANY THOUGHTS OF KILLING YOURSELF?: NO
2. HAVE YOU ACTUALLY HAD ANY THOUGHTS OF KILLING YOURSELF?: NO
6. HAVE YOU EVER DONE ANYTHING, STARTED TO DO ANYTHING, OR PREPARED TO DO ANYTHING TO END YOUR LIFE?: NO
1. SINCE LAST CONTACT, HAVE YOU WISHED YOU WERE DEAD OR WISHED YOU COULD GO TO SLEEP AND NOT WAKE UP?: NO
6. HAVE YOU EVER DONE ANYTHING, STARTED TO DO ANYTHING, OR PREPARED TO DO ANYTHING TO END YOUR LIFE?: NO
1. SINCE LAST CONTACT, HAVE YOU WISHED YOU WERE DEAD OR WISHED YOU COULD GO TO SLEEP AND NOT WAKE UP?: NO

## 2025-03-03 ASSESSMENT — PAIN DESCRIPTION - DESCRIPTORS: DESCRIPTORS: ACHING

## 2025-03-03 ASSESSMENT — PAIN DESCRIPTION - LOCATION: LOCATION: NECK

## 2025-03-03 ASSESSMENT — LIFESTYLE VARIABLES
SUBSTANCE_ABUSE_PAST_12_MONTHS: YES
PRESCIPTION_ABUSE_PAST_12_MONTHS: YES

## 2025-03-03 ASSESSMENT — PAIN - FUNCTIONAL ASSESSMENT: PAIN_FUNCTIONAL_ASSESSMENT: 0-10

## 2025-03-03 ASSESSMENT — PAIN SCALES - GENERAL: PAINLEVEL_OUTOF10: 5 - MODERATE PAIN

## 2025-03-03 ASSESSMENT — PAIN DESCRIPTION - PAIN TYPE: TYPE: ACUTE PAIN

## 2025-03-03 NOTE — PROGRESS NOTES
EPAT - Social Work Psychiatric Assessment    Arrival Details  Mode of Arrival: Ambulatory  Admission Source: Home  Admission Type: Voluntary  EPAT Assessment Start Date: 03/03/25  EPAT Assessment Start Time: 0851  Name of : Fernando Pa    History of Present Illness  Admission Reason: Panic Attack  HPI: Patient is a 35 year old female who presented to the ED today with a Panic Attack. Her stressors are a friend passing away recently and having to clear out some of her home for a renovation. She denies any suicidal or homicidal ideation. She denies any hallucinations. A review of her Triage, Provider and Lorane was conducted.     Readmission Information   Readmission within 30 Days: No    Psychiatric Symptoms  Anxiety Symptoms: Generalized  Depression Symptoms: No problems reported or observed.  Phyllis Symptoms: No problems reported or observed.    Psychosis Symptoms  Hallucination Type: No problems reported or observed.  Delusion Type: No problems reported or observed.    Additional Symptoms - Adult  Generalized Anxiety Disorder: Excessive anxiety/worry  Obsessive Compulsive Disorder: No problems reported or observed.  Panic Attack: Other (Comment)  Post Traumatic Stress Disorder: No problems reported or observed.  Delirium: No problems reported or observed.  Review of Symptoms Comments: Please see above    Past Psychiatric History/Meds/Treatments  Past Psychiatric History: Patient has a history of Bi-Polar, Anxiety and Depression. She sees provider at  and attends an [facility] three days a week virtually. She denies any history of self harm. She denies any history of substance treatment.  Past Psychiatric Meds/Treatments: none  Past Violence/Victimization History: none    Current Mental Health Contacts   Name/Phone Number: /IOP   Last Appointment Date: Every Monday, Tuesday and Thursday Virtually  Provider Name/Phone Number: /Psychiatry  Provider Last Appointment Date: as  needed.    Support System: Immediate family, Friends, Community    Living Arrangement: House    Home Safety  Feels Safe Living in Home: Yes         Milt[place] Service/Education History  Current or Previous  Service: None  Education Level: College  History of Learning Problems: No  History of School Behavior Problems: No  School History: see above    Social/Cultural History  Social History: Patient is her own guardian  Important Activities: Hobbies, Family, Social    Legal  Legal Concerns: none    Drug Screening  Have you used any substances (canabis, cocaine, heroin, hallucinogens, inhalants, etc.) in the past 12 months?: Yes  Have you used any prescription drugs other than prescribed in the past 12 months?: Yes  Is a toxicology screen needed?: Yes    Stage of Change  Stage of Change: Precontemplation  History of Treatment: Other (Comment)  Type of Treatment Offered: Other (Comment)  Treatment Offered: Declined  Duration of Substance Use: n/a  Frequency of Substance Use: n/a  Age of First Substance Use: n/a    Behavioral Health  Behavioral Health(WDL): Exceptions to WDL  Behaviors/Mood: Cooperative, Tearful  Affect: Appropriate to circumstances  Parent/Guardian/Significant Other Involvement: Attentive to patient needs  Family Behaviors: Appropriate for situation  Visitor Behaviors: Appropriate for situation    Orientation  Orientation Level: Oriented X4    General Appearance  Motor Activity: Unremarkable  Speech Pattern: Other (Comment)  General Attitude: Cooperative  Appearance/Hygiene: Unremarkable    Thought Process  Coherency: Other (Comment)  Content: Unremarkable  Delusions: Other (Comment)  Perception: Not altered  Hallucination: None  Judgment/Insight:  (fair)  Confusion: None  Cognition: Follows commands    Sleep Pattern  Sleep Pattern: Difficulty falling asleep    Risk Factors  Self Harm/Suicidal Ideation Plan: Patient denies  Previous Self Harm/Suicidal Plans: none  Risk Factors: None    Violence  Risk Assessment  Assessment of Violence: None noted  Thoughts of Harm to Others: No    Ability to Assess Risk Screen  Risk Screen - Ability to Assess: Able to be screened  Ask Suicide-Screening Questions  1. In the past few weeks, have you wished you were dead?: No  2. In the past few weeks, have you felt that you or your family would be better off if you were dead?: No  3. In the past week, have you been having thoughts about killing yourself?: No  4. Have you ever tried to kill yourself?: No  5. Are you having thoughts of killing yourself right now?: No  Calculated Risk Score: No intervention is necessary  [place] Suicide Severity Rating Scale (Screener/Recent Self-Report)  1. Wish to be Dead (Past 1 Month): No  2. Non-Specific Active Suicidal Thoughts (Past 1 Month): No  6. Suicidal Behavior (Lifetime): No  Calculated C-SSRS Risk Score (Lifetime/Recent): No Risk Indicated  Step 1: Risk Factors  Current & Past Psychiatric Dx: Mood disorder  Presenting Symptoms: Anxiety and/or panic  Family History: Other (Comment)  Precipitants/Stressors: Triggering events leading to humiliation, shame, and/or despair (e.g. loss of relationship, financial or health status) (real or anticipated)  Change in Treatment: Other (Comment)  Access to Lethal Methods : No  Step 2: Protective Factors   Protective Factors Internal: Ability to cope with stress, Identifies reasons for living  Protective Factors External: Cultural, spiritual and/or moral attitudes against suicide, Responsibility to children, Supportive social network or family or friends, Positive therapeutic relationships, Engaged in work or school  Step 3: Suicidal Ideation Intensity  How Many Times Have You Had These Thoughts: Less than once a week  When You Have the Thoughts How Long do They Last : Fleeting - few seconds or minutes  Could/Can You Stop Thinking About Killing Yourself or Wanting to Die if You Want to: Does not attempt to control thoughts  Are There Things -  "Anyone or Anything - That Stopped You From Wanting to Die or Acting on: Does not apply  What Sort of Reasons Did You Have For Thinking About Wanting to Die or Killing Yourself: Does not apply  Total Score: 2  Step 5: Documentation  Risk Level: Low suicide risk (Patient is low risk. Dr. Gudino agrees.)    Psychiatric Impression and Plan of Care  Assessment and Plan: Patient is a 35 year old female who presented to the ED after having a panic attack. Patient has a history of Bi-Polar. She reports she was at a  of a friend this past week which caused stress. She also is stressed with work due to a workman's comp claim. In addition she is rearranging her home to get ready for renovations. She reports feels better now but earlier shared she \"could not handle it\". She was tearful when discussing her friend's passing. She lives with her boyfriend and shared she feels safe there. She denies any thoughts of self harm or history of self harm. She denies any homicidal thoughts or hallucinations. She sees a therapist weekly, attends an IOP every Monday, Tuesday and Thursday at  as well as has a Psychaitrist. She shared she takes her medications as prescribed.  Specific Resources Provided to Patient:   CM Notified: no  PHP/IOP Recommended: currently in an IOP at   Specific Information Provided for PHP/IOP: n/a  Plan Comments: discharge    Outcome/Disposition  Patient's Perception of Outcome Achieved: patient is help seeking  Assessment, Recommendations and Risk Level Reviewed with: discharge  Contact Name: Hattie Randolph  Contact Number(s): [phone]  Contact Relationship: parent  EPAT Assessment Completed Date: 25  EPAT Assessment Completed Time: 921  Patient Disposition: Home    Social Work Note  "

## 2025-03-03 NOTE — ED TRIAGE NOTES
"Patient came to the ED via EMS c/o a psych evalv. She was with her boyfriend up late playing the [employer] when she started to have a panic attack, her trigger word is \"climate\". Hx of bipolar. Denies SI and HI.   "

## 2025-03-03 NOTE — ED PROVIDER NOTES
HPI   Chief Complaint   Patient presents with    Psychiatric Evaluation       HPI: []  35-year-old  female history of asthma, bipolar disorder comes in with anxiety attack.  She states that she was with her boyfriend and she got triggered by certain song and she became anxious and had a panic attack.  She denies chest pain pressure heaviness fever chills nausea vomit diarrhea cough congestion incontinence seizures syncope or near syncope  she is currently not suicidal homicidal or delusional.    Past history: Asthma, bipolar disorder  Social: Patient denies current tobacco alcohol drug abuse.  REVIEW OF SYSTEMS:    GENERAL.: No weight loss, fatigue, anorexia, insomnia, fever.    EYES: No vision loss, double vision, drainage, eye pain.    ENT: No pharyngitis, dry mouth.    CARDIOPULMONARY: No chest pain, palpitations, syncope, near syncope. No shortness of breath, cough, hemoptysis.    GI: No abdominal pain, change in bowel habits, melena, hematemesis, hematochezia, nausea, vomiting, diarrhea.    : No discharge, dysuria, frequency, urgency, hematuria.    MS: No limb pain, joint pain, joint swelling.    SKIN: No rashes.    PSYCH: No depression, positive anxiety, suicidality, homicidality.    Review of systems is otherwise negative unless stated above or in history of present illness.  Social history, family history, allergies reviewed.  PHYSICAL EXAM:    GENERAL: Vitals noted, no distress. Alert and oriented  x 3. Non-toxic.  Appears to be manic but easily redirectable    EENT: TMs clear. Posterior oropharynx unremarkable. No meningismus. No LAD.     NECK: Supple. Nontender. No midline tenderness.     CARDIAC: Regular, rate, rhythm. No murmurs rubs or gallops. No JVD    PULMONARY: Lungs clear bilaterally with good aeration. No wheezes rales or rhonchi. No respiratory distress.     ABDOMEN: Soft, nonsurgical. Nontender. No peritoneal signs. Normoactive bowel sounds. No pulsatile masses.     EXTREMITIES: No  peripheral edema. Negative Homans bilaterally, no cords.  2+ bounding pulses well-perfused    SKIN: No rash. Intact.     NEURO: No focal neurologic deficits, NIH score of 0. Cranial nerves normal as tested from II through XII.     MEDICAL DECISION MAKING:  CBC with differential chemistries LFTs are normal serum drug screen negative UA negative pregnancy negative.    Treatment in the ED: Patient given Zyprexa 10 mg intramuscular and oral ibuprofen  Consult: EPAT consulted  ED course: EPAT felt patient can be discharged home does not not require inpatient psychiatric assessment treatment    On repeat assessment she is calm and cooperative wants to go home not suicidal homicidal psychotic delusional family at bedside she will engage in outpatient IOP    Impression: #1 anxiety, #2 bipolar disorder  Plan set MDM: 35-year-old female history of bipolar disorder comes in with anxiety and panic attack appeared to be manic upon arrival received Zyprexa currently calm and cooperative not suicidal homicidal psychotic delusional psychiatry saw the patient EPAT did evaluate if they felt patient is currently not at high risk for self-harm or harm to others can be safely discharged home with an outpatient follow-up patient is a concurs that she is calm and cooperative family at bedside my concern for suicidal ideation intent or homicide ideation intent is low.  Patient to be discharged home she will engage in outpatient IOP advised and close outpatient follow-up with strict return precaution.              Patient History   Past Medical History:   Diagnosis Date    Asthma     Bipolar disorder, unspecified (Multi) 06/15/2021    Bipolar and related disorder    Chronic frontal sinusitis 05/14/2019    Chronic frontal sinusitis    Encounter for fertility testing 05/27/2021    Fertility testing    Other specified joint disorders, unspecified knee 09/20/2017    Knee joint cyst, unspecified laterality    Other specified postprocedural  states 07/27/2016    History of verrucae (wart) excision    Personal history of other diseases of the female genital tract 06/20/2014    History of dysmenorrhea    Personal history of other diseases of the female genital tract 06/20/2014    History of irregular menstrual cycles    Personal history of other infectious and parasitic diseases 07/27/2016    History of viral warts    Personal history of other infectious and parasitic diseases 07/27/2016    History of verruca vulgaris    Personal history of other specified conditions 09/20/2017    History of insomnia    Viral wart, unspecified 07/27/2016    Verruca warts (infectious)     Past Surgical History:   Procedure Laterality Date    OTHER SURGICAL HISTORY  04/18/2019    No history of surgery     Family History   Problem Relation Name Age of Onset    Asthma Mother      Hypertension Father          benign    Asthma Brother      Schizophrenia Cousin       Social History     Tobacco Use    Smoking status: Never    Smokeless tobacco: Never   Vaping Use    Vaping status: Never Used   Substance Use Topics    Alcohol use: Not Currently    Drug use: Not Currently       Physical Exam   ED Triage Vitals [03/03/25 0604]   Temperature Heart Rate Respirations BP   36.8 °C (98.3 °F) (!) 120 20 125/86      Pulse Ox Temp Source Heart Rate Source Patient Position   98 % Oral -- --      BP Location FiO2 (%)     -- --       Physical Exam      ED Course & MDM   ED Course as of 03/03/25 1032   Mon Mar 03, 2025   1009 CBC with differential chemistries liver functions are normal UA negative pregnancy negative serum Riskin negative urine drug screen pending.  Patient medically cleared for psychiatry consulted they felt patient is at her baseline psychiatry status exam patient be discharged home currently not suicidal or homicidal and does not meet criteria for inpatient assessment and treatment on my assessment patient is calm and cooperative now family at bedside she wants to go home on  reinterrogation she is not suicidal or homicidal or psychotic or delusional and she will engage in outpatient IOP. [MT]      ED Course User Index  [MT] Lisseth Gudino MD         Diagnoses as of 03/03/25 1032   Bipolar affective disorder, remission status unspecified (Multi)                 No data recorded     Elian Coma Scale Score: 15 (03/03/25 0726 : Kassidy Osorio RN)                           Medical Decision Making      Procedure  Procedures     Lisseth Gudino MD  03/03/25 1038

## 2025-03-04 ENCOUNTER — APPOINTMENT (OUTPATIENT)
Dept: BEHAVIORAL HEALTH | Facility: CLINIC | Age: 35
End: 2025-03-04
Payer: MEDICAID

## 2025-03-04 ENCOUNTER — HOSPITAL ENCOUNTER (EMERGENCY)
Facility: HOSPITAL | Age: 35
Discharge: PSYCHIATRIC OR SUBSTANCE USE FACILITY | End: 2025-03-05
Attending: EMERGENCY MEDICINE
Payer: MEDICAID

## 2025-03-04 DIAGNOSIS — Z00.8 ENCOUNTER FOR PSYCHOLOGICAL EVALUATION: Primary | ICD-10-CM

## 2025-03-04 PROBLEM — R45.89: Status: ACTIVE | Noted: 2025-03-04

## 2025-03-04 LAB
AMPHETAMINES UR QL SCN: NORMAL
B-HCG SERPL-ACNC: <2 MIU/ML
BARBITURATES UR QL SCN: NORMAL
BASOPHILS # BLD AUTO: 0.03 X10*3/UL (ref 0–0.1)
BASOPHILS NFR BLD AUTO: 0.3 %
BENZODIAZ UR QL SCN: NORMAL
BZE UR QL SCN: NORMAL
CANNABINOIDS UR QL SCN: NORMAL
EOSINOPHIL # BLD AUTO: 0.28 X10*3/UL (ref 0–0.7)
EOSINOPHIL NFR BLD AUTO: 3.3 %
ERYTHROCYTE [DISTWIDTH] IN BLOOD BY AUTOMATED COUNT: 13.3 % (ref 11.5–14.5)
FENTANYL+NORFENTANYL UR QL SCN: NORMAL
HCT VFR BLD AUTO: 36.1 % (ref 36–46)
HGB BLD-MCNC: 11.8 G/DL (ref 12–16)
IMM GRANULOCYTES # BLD AUTO: 0.01 X10*3/UL (ref 0–0.7)
IMM GRANULOCYTES NFR BLD AUTO: 0.1 % (ref 0–0.9)
LYMPHOCYTES # BLD AUTO: 2.21 X10*3/UL (ref 1.2–4.8)
LYMPHOCYTES NFR BLD AUTO: 25.7 %
MCH RBC QN AUTO: 29.1 PG (ref 26–34)
MCHC RBC AUTO-ENTMCNC: 32.7 G/DL (ref 32–36)
MCV RBC AUTO: 89 FL (ref 80–100)
METHADONE UR QL SCN: NORMAL
MONOCYTES # BLD AUTO: 0.7 X10*3/UL (ref 0.1–1)
MONOCYTES NFR BLD AUTO: 8.1 %
NEUTROPHILS # BLD AUTO: 5.36 X10*3/UL (ref 1.2–7.7)
NEUTROPHILS NFR BLD AUTO: 62.5 %
NRBC BLD-RTO: 0 /100 WBCS (ref 0–0)
OPIATES UR QL SCN: NORMAL
OXYCODONE+OXYMORPHONE UR QL SCN: NORMAL
PCP UR QL SCN: NORMAL
PLATELET # BLD AUTO: 262 X10*3/UL (ref 150–450)
RBC # BLD AUTO: 4.05 X10*6/UL (ref 4–5.2)
WBC # BLD AUTO: 8.6 X10*3/UL (ref 4.4–11.3)

## 2025-03-04 PROCEDURE — 85025 COMPLETE CBC W/AUTO DIFF WBC: CPT | Performed by: EMERGENCY MEDICINE

## 2025-03-04 PROCEDURE — 36415 COLL VENOUS BLD VENIPUNCTURE: CPT | Performed by: EMERGENCY MEDICINE

## 2025-03-04 PROCEDURE — 84702 CHORIONIC GONADOTROPIN TEST: CPT | Performed by: EMERGENCY MEDICINE

## 2025-03-04 PROCEDURE — 99285 EMERGENCY DEPT VISIT HI MDM: CPT | Performed by: EMERGENCY MEDICINE

## 2025-03-04 PROCEDURE — 80053 COMPREHEN METABOLIC PANEL: CPT | Performed by: EMERGENCY MEDICINE

## 2025-03-04 PROCEDURE — 80307 DRUG TEST PRSMV CHEM ANLYZR: CPT | Performed by: EMERGENCY MEDICINE

## 2025-03-04 PROCEDURE — 2500000001 HC RX 250 WO HCPCS SELF ADMINISTERED DRUGS (ALT 637 FOR MEDICARE OP): Performed by: EMERGENCY MEDICINE

## 2025-03-04 PROCEDURE — 2500000002 HC RX 250 W HCPCS SELF ADMINISTERED DRUGS (ALT 637 FOR MEDICARE OP, ALT 636 FOR OP/ED): Performed by: EMERGENCY MEDICINE

## 2025-03-04 PROCEDURE — 82077 ASSAY SPEC XCP UR&BREATH IA: CPT | Performed by: EMERGENCY MEDICINE

## 2025-03-04 RX ORDER — HYDROXYZINE PAMOATE 25 MG/1
50 CAPSULE ORAL ONCE
Status: COMPLETED | OUTPATIENT
Start: 2025-03-04 | End: 2025-03-04

## 2025-03-04 RX ORDER — LITHIUM CARBONATE 300 MG/1
300 TABLET, FILM COATED, EXTENDED RELEASE ORAL ONCE
Status: COMPLETED | OUTPATIENT
Start: 2025-03-04 | End: 2025-03-04

## 2025-03-04 RX ORDER — TRIPROLIDINE/PSEUDOEPHEDRINE 2.5MG-60MG
400 TABLET ORAL ONCE
Status: COMPLETED | OUTPATIENT
Start: 2025-03-04 | End: 2025-03-04

## 2025-03-04 RX ADMIN — IBUPROFEN 400 MG: 100 SUSPENSION ORAL at 19:26

## 2025-03-04 RX ADMIN — HYDROXYZINE PAMOATE 50 MG: 25 CAPSULE ORAL at 19:26

## 2025-03-04 RX ADMIN — LITHIUM CARBONATE 300 MG: 300 TABLET, EXTENDED RELEASE ORAL at 19:26

## 2025-03-04 SDOH — ECONOMIC STABILITY: HOUSING INSECURITY: FEELS SAFE LIVING IN HOME: YES

## 2025-03-04 SDOH — HEALTH STABILITY: MENTAL HEALTH: BEHAVIORAL HEALTH(WDL): EXCEPTIONS TO WDL

## 2025-03-04 SDOH — HEALTH STABILITY: MENTAL HEALTH: WISH TO BE DEAD (PAST 1 MONTH): NO

## 2025-03-04 SDOH — HEALTH STABILITY: MENTAL HEALTH: SUICIDAL BEHAVIOR (LIFETIME): NO

## 2025-03-04 SDOH — HEALTH STABILITY: MENTAL HEALTH: IN THE PAST FEW WEEKS, HAVE YOU WISHED YOU WERE DEAD?: NO

## 2025-03-04 SDOH — HEALTH STABILITY: MENTAL HEALTH: DEPRESSION SYMPTOMS: APPETITE CHANGE;LOSS OF INTEREST;ISOLATIVE;IMPAIRED CONCENTRATION

## 2025-03-04 SDOH — HEALTH STABILITY: MENTAL HEALTH: BEHAVIORS/MOOD: PLEASANT;MANIPULATIVE;DELUSIONS;PACING;RESTLESS

## 2025-03-04 SDOH — HEALTH STABILITY: MENTAL HEALTH: SUICIDE ASSESSMENT: ADULT (C-SSRS)

## 2025-03-04 SDOH — HEALTH STABILITY: MENTAL HEALTH: HAVE YOU EVER TRIED TO KILL YOURSELF?: NO

## 2025-03-04 SDOH — HEALTH STABILITY: MENTAL HEALTH: IN THE PAST FEW WEEKS, HAVE YOU FELT THAT YOU OR YOUR FAMILY WOULD BE BETTER OFF IF YOU WERE DEAD?: NO

## 2025-03-04 SDOH — HEALTH STABILITY: MENTAL HEALTH: ARE YOU HAVING THOUGHTS OF KILLING YOURSELF RIGHT NOW?: NO

## 2025-03-04 SDOH — HEALTH STABILITY: MENTAL HEALTH: NON-SPECIFIC ACTIVE SUICIDAL THOUGHTS (PAST 1 MONTH): NO

## 2025-03-04 SDOH — HEALTH STABILITY: MENTAL HEALTH: ANXIETY SYMPTOMS: GENERALIZED;PANIC ATTACK

## 2025-03-04 SDOH — HEALTH STABILITY: MENTAL HEALTH: IN THE PAST WEEK, HAVE YOU BEEN HAVING THOUGHTS ABOUT KILLING YOURSELF?: NO

## 2025-03-04 SDOH — HEALTH STABILITY: MENTAL HEALTH: HAVE YOU WISHED YOU WERE DEAD OR WISHED YOU COULD GO TO SLEEP AND NOT WAKE UP?: NO

## 2025-03-04 SDOH — HEALTH STABILITY: MENTAL HEALTH: BEHAVIORS/MOOD: COOPERATIVE;FLAT AFFECT

## 2025-03-04 SDOH — HEALTH STABILITY: MENTAL HEALTH: NEEDS EXPRESSED: DENIES

## 2025-03-04 SDOH — HEALTH STABILITY: MENTAL HEALTH: HAVE YOU ACTUALLY HAD ANY THOUGHTS OF KILLING YOURSELF?: NO

## 2025-03-04 ASSESSMENT — PAIN DESCRIPTION - DESCRIPTORS: DESCRIPTORS: ACHING

## 2025-03-04 ASSESSMENT — PAIN DESCRIPTION - LOCATION
LOCATION: BACK
LOCATION: BACK

## 2025-03-04 ASSESSMENT — COLUMBIA-SUICIDE SEVERITY RATING SCALE - C-SSRS
1. IN THE PAST MONTH, HAVE YOU WISHED YOU WERE DEAD OR WISHED YOU COULD GO TO SLEEP AND NOT WAKE UP?: NO
2. HAVE YOU ACTUALLY HAD ANY THOUGHTS OF KILLING YOURSELF?: NO
6. HAVE YOU EVER DONE ANYTHING, STARTED TO DO ANYTHING, OR PREPARED TO DO ANYTHING TO END YOUR LIFE?: NO
1. SINCE LAST CONTACT, HAVE YOU WISHED YOU WERE DEAD OR WISHED YOU COULD GO TO SLEEP AND NOT WAKE UP?: NO
2. HAVE YOU ACTUALLY HAD ANY THOUGHTS OF KILLING YOURSELF?: NO
6. HAVE YOU EVER DONE ANYTHING, STARTED TO DO ANYTHING, OR PREPARED TO DO ANYTHING TO END YOUR LIFE?: NO

## 2025-03-04 ASSESSMENT — PAIN DESCRIPTION - PAIN TYPE: TYPE: ACUTE PAIN

## 2025-03-04 ASSESSMENT — PAIN - FUNCTIONAL ASSESSMENT: PAIN_FUNCTIONAL_ASSESSMENT: 0-10

## 2025-03-04 ASSESSMENT — PAIN DESCRIPTION - ORIENTATION: ORIENTATION: LOWER

## 2025-03-04 ASSESSMENT — PAIN SCALES - GENERAL
PAINLEVEL_OUTOF10: 8
PAINLEVEL_OUTOF10: 10 - WORST POSSIBLE PAIN

## 2025-03-04 ASSESSMENT — LIFESTYLE VARIABLES
PRESCIPTION_ABUSE_PAST_12_MONTHS: NO
SUBSTANCE_ABUSE_PAST_12_MONTHS: NO
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
## 2025-03-04 NOTE — ED NOTES
Pt belongings bhavya. Pt has food in c pod nutrition room     Phuong Randall, SAE  03/04/25 7375

## 2025-03-05 VITALS
TEMPERATURE: 98.1 F | RESPIRATION RATE: 17 BRPM | HEART RATE: 104 BPM | WEIGHT: 198 LBS | SYSTOLIC BLOOD PRESSURE: 125 MMHG | DIASTOLIC BLOOD PRESSURE: 78 MMHG | HEIGHT: 63 IN | BODY MASS INDEX: 35.08 KG/M2 | OXYGEN SATURATION: 94 %

## 2025-03-05 LAB
ALBUMIN SERPL BCP-MCNC: 4.7 G/DL (ref 3.4–5)
ALP SERPL-CCNC: 80 U/L (ref 33–110)
ALT SERPL W P-5'-P-CCNC: 28 U/L (ref 7–45)
ANION GAP SERPL CALC-SCNC: 14 MMOL/L (ref 10–20)
AST SERPL W P-5'-P-CCNC: 36 U/L (ref 9–39)
BILIRUB SERPL-MCNC: 0.4 MG/DL (ref 0–1.2)
BUN SERPL-MCNC: 17 MG/DL (ref 6–23)
CALCIUM SERPL-MCNC: 9.9 MG/DL (ref 8.6–10.3)
CHLORIDE SERPL-SCNC: 105 MMOL/L (ref 98–107)
CO2 SERPL-SCNC: 27 MMOL/L (ref 21–32)
CREAT SERPL-MCNC: 1.15 MG/DL (ref 0.5–1.05)
EGFRCR SERPLBLD CKD-EPI 2021: 64 ML/MIN/1.73M*2
ETHANOL SERPL-MCNC: <10 MG/DL
GLUCOSE SERPL-MCNC: 114 MG/DL (ref 74–99)
POTASSIUM SERPL-SCNC: 4.8 MMOL/L (ref 3.5–5.3)
PROT SERPL-MCNC: 7.2 G/DL (ref 6.4–8.2)
SODIUM SERPL-SCNC: 141 MMOL/L (ref 136–145)

## 2025-03-05 SDOH — HEALTH STABILITY: MENTAL HEALTH: BEHAVIORAL HEALTH(WDL): EXCEPTIONS TO WDL

## 2025-03-05 SDOH — HEALTH STABILITY: MENTAL HEALTH: BEHAVIORS/MOOD: CALM

## 2025-03-05 SDOH — HEALTH STABILITY: MENTAL HEALTH: NEEDS EXPRESSED: DENIES

## 2025-03-05 NOTE — PROGRESS NOTES
Patient is a 35-year-old female who presented to the emergency room for psychiatric evaluation for second day in a row.  She was initially seen and evaluated by Dr. Gomes and signed out to me after he medically cleared her pending evaluation by emergency psychiatric assessment team.  Please see his initial physician note for details.  EPAT has evaluated patient and determined she meets inpatient criteria and would benefit from psychiatric hospitalization at this time.  She remains in stable condition at this time awaiting placement. Patient was accepted by Dr. Newman to [facility].  Remains in stable condition at time of signout.    Hannah Kimble MD
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
## 2025-03-05 NOTE — SIGNIFICANT EVENT
Application for Emergency Admission      Ready for Transfer?  Is the patient medically cleared for transfer to inpatient psychiatry: Yes  Has the patient been accepted to an inpatient psychiatric hospital: Yes    Application for Emergency Admission  IN ACCORDANCE WITH [place]122[place]10 [place]  The Chief Clinical Officer of: [facility]  3/5/2025 .2:47 AM    Reason for Hospitalization  The undersigned has reason to believe that: Julisa Parnell Is a mentally ill person subject to hospitalization by court order under division B Section 5122.01 of the Revised Code, i.e., this person:    1.Yes  Represents a substantial risk of physical harm to self as manifested by evidence of threats of, or attempts at, suicide or serious self-inflicted bodily harm    2.Yes Represents a substantial risk of physical harm to others as manifested by evidence of recent homicidal or other violent behavior, evidence of recent threats that place another in reasonable fear of violent behavior and serious physical harm, or other evidence of present dangerousness    3.Yes Represents a substantial and immediate risk of serious physical impairment or injury to self as manifested by  evidence that the person is unable to provide for and is not providing for the person's basic physical needs because of the person's mental illness and that appropriate provision for those needs cannot be made  immediately available in the community    4.Yes Would benefit from treatment in a hospital for his mental illness and is in need of such treatment as manifested by evidence of behavior that creates a grave and imminent risk to substantial rights of others or  himself.    5.Yes Would benefit from treatment as manifested by evidence of behavior that indicates all of the following:       (a) The person is unlikely to survive safely in the community without supervision, based on a clinical determination.       (b) The person has a history of lack of  compliance with treatment for mental illness and one of the following applies:      (i) At least twice within the thirty-six months prior to the filing of an affidavit seeking court-ordered treatment of the person under section 5122.111 of the Revised Code, the lack of compliance has been a significant factor in necessitating hospitalization in a hospital or receipt of services in a forensic or other mental health unit of a correctional facility, provided that the thirty-six-month period shall be extended by the length of any hospitalization or incarceration of the person that occurred within the thirty-six-month period.      (ii) Within the forty-eight months prior to the filing of an affidavit seeking court-ordered treatment of the person under section 5122.111 of the Revised Code, the lack of compliance resulted in one or more acts of serious violent behavior toward self or others or threats of, or attempts at, serious physical harm to self or others, provided that the forty-eight-month period shall be extended by the length of any hospitalization or incarceration of the person that occurred within the forty-eight-month period.      (c) The person, as a result of mental illness, is unlikely to voluntarily participate in necessary treatment.       (d) In view of the person's treatment history and current behavior, the person is in need of treatment in order to prevent a relapse or deterioration that would be likely to result in substantial risk of serious harm to the person or others.    (e) Represents a substantial risk of physical harm to self or others if allowed to remain at liberty pending examination.    Therefore, it is requested that said person be admitted to the above named facility.    STATEMENT OF BELIEF    Must be filled out by one of the following: a psychiatrist, licensed physician, licensed clinical psychologist, health or ,  or .  (Statement shall include the  circumstances under which the individual was taken into custody and the reason for the person's belief that hospitalization is necessary. The statement shall also include a reference to efforts made to secure the individual's property at his residence if he was taken into custody there. Every reasonable and appropriate effort should be made to take this person into custody in the least conspicuous manner possible.)    Hannah Kimble MD 3/5/2025     _____________________________________________________________   Place of Employment: [employer]    STATEMENT OF OBSERVATION BY PSYCHIATRIST, LICENSED PHYSICIAN, OR LICENSED CLINICAL PSYCHOLOGIST, IF APPLICABLE    Place of Observation (e.g., [facility] center, general hospital, office, emergency facility)  (If applicable, please complete)    Hannah Kimble MD 3/5/2025    _____________________________________________________________

## 2025-03-06 ENCOUNTER — APPOINTMENT (OUTPATIENT)
Dept: BEHAVIORAL HEALTH | Facility: CLINIC | Age: 35
End: 2025-03-06
Payer: MEDICAID

## 2025-03-07 ENCOUNTER — APPOINTMENT (OUTPATIENT)
Dept: BEHAVIORAL HEALTH | Facility: CLINIC | Age: 35
End: 2025-03-07
Payer: MEDICAID

## 2025-03-07 ENCOUNTER — DOCUMENTATION (OUTPATIENT)
Dept: BEHAVIORAL HEALTH | Facility: CLINIC | Age: 35
End: 2025-03-07
Payer: MEDICAID

## 2025-03-07 NOTE — PROGRESS NOTES
INTENSIVE OUTPATIENT PROGRAM MULTIDISCIPLINARY  TREATMENT PLAN & PROGRESS NOTE     Patient: Julisa Parnell  :1990 Age: 35    Student: No  Treatment Team Date:2025    MRN#[id number]    Psychiatrist: Dr. Chirag Palma  Date of IOP Admission: 2025  Ref by: Chen Holman           Week  2                    Admission Scores: Did not complete  GLENROY 21:  MY:  BDI:   PHQ-9:  MAST: DAST: MDQ:   [place]:     Current Risk Assessment:  Suicidal Risk:      None: X    Ideation:       Plan:      Intent:      SI Plan with plan contracts for safety:     Hx of harming self:        Homicidal Risk:  None:  X Ideation:       Plan:      Intent:      HI Plan with means:                                     Hx of harming others:          Global Improvement    Clinical Global Impressions Rating Scale Of Illness:  4  1-No Illness Present   2-Minimal   3-Mild   4-Moderate   5-Marked   6-Severe  X 7-Very Severe     Diagnoses & ICD-10 Codes F53.0  Primary Diagnosis & Code: Postpartum depression     Secondary Diagnosis & Code:   Psychosocial & Environmental Stressors:   Financial  insecurity :   Medication  Family/Home life: X  Work/School:X    Inadequacy of social support      Patient's Treatment Goals:            Date Initiated:            Progress Update:  25  1.  Attend and actively participate in IOP _3_ days/week for 3 hours per day   2025 Good   Fair    Poor X  Unclear   2.  Attend weekly medication management sessions and maintain compliance of medications  2025   Good    Fair     Poor  X   Unclear                                                        2.  Identify symptoms of diagnoses and develop coping plans    2025  Good   Fair     Poor     Unclear   X                                              3.  Increase illness education and symptom insight                  2025 Good   Fair     Poor     Unclear    X    Current medications:  See EMR chart        Progress note:  __New  to the IOP program, attending as planned  __Compliant, Progressing & Improving - Needs more time in IOP therapy program   __Compliant, Progressing & Improving Planning Discharge   __Compliant, Not Progressing or Improving: Reason  __Non-Compliant, not progressing or improving: Plan  _X_Other: Patient admitted to inpatient psychiatric unit this past week. Patient will be discharged from the program at this time.     Insurance & Benefits: Name of Insurance: [employer] Medicaid ,IN NETWORK, Yes    BENEFITS ARE BASED ON MEDICAL NECESSITY ONLY: Unlimited visits, covers 100 % of the contracted rate after deductible has been met.     Co-Pay per day: $0    DEDUCTIBLE        Single:  / Family: / Accumulation:   Single:  /  Family: /   CO- INSURANCE  Single:  / Family:  / Accumulation:  Single:  /  Family: /    OUT OF POCKET  Single:  / Family:  / Accumulation:  Single: /  Family: /       Total IOP Sessions completed & authorized to date:  2    Discharge plans have been discussed with patient & Dr. SAULO Palma on:   Reason for discharge:    ___Successfully completed IOP treatment:   ___Pt. decided to seek treatment elsewhere:   ___Dropped out or no show more than three times:  ___Benefits exhausted:   ___Other: Patient admitted to inpatient psychiatric unit. Recommending that patient continue with traditional behavioral health IOP following her discharge.     Discharge date:                   Discharge Prognosis: Excellent      Good     Fair X     Poor    Follow up appointment with: Physician: Margareth Alvarado   Follow up appointment with: Therapist:  Chen Holman   Follow up Aftercare group?  Yes __   No X__     Patient provided with Crisis Hotline phone number for suicide prevention? Yes _X_ No __    Discharge Scores:   GLENROY 21:  MY:  BDI:   PHQ-9:  MAST: DAST: MDQ:   [place]:    Treatment plan electronically signed by Treatment Team:   SAWYER Palma MD, JOE Blackwell, CHARLIE Busby K. Fogarty, PsyD

## 2025-03-10 ENCOUNTER — APPOINTMENT (OUTPATIENT)
Dept: BEHAVIORAL HEALTH | Facility: CLINIC | Age: 35
End: 2025-03-10
Payer: MEDICAID

## 2025-03-11 ENCOUNTER — APPOINTMENT (OUTPATIENT)
Dept: BEHAVIORAL HEALTH | Facility: CLINIC | Age: 35
End: 2025-03-11
Payer: MEDICAID

## 2025-03-11 ENCOUNTER — TELEPHONE (OUTPATIENT)
Dept: OTHER | Age: 35
End: 2025-03-11

## 2025-03-11 ENCOUNTER — APPOINTMENT (OUTPATIENT)
Dept: PRIMARY CARE | Facility: CLINIC | Age: 35
End: 2025-03-11
Payer: MEDICAID

## 2025-03-11 NOTE — TELEPHONE ENCOUNTER
Nurse from [facility] called for appt, Julisa was admitted on 3/5th, already has fu with u on [address], they asked about counselor but she was seeing [facility],just a heads up all her group therapy has been cx by us, ramiro why, thank you

## 2025-03-13 ENCOUNTER — DOCUMENTATION (OUTPATIENT)
Dept: BEHAVIORAL HEALTH | Facility: CLINIC | Age: 35
End: 2025-03-13

## 2025-03-13 ENCOUNTER — APPOINTMENT (OUTPATIENT)
Dept: BEHAVIORAL HEALTH | Facility: CLINIC | Age: 35
End: 2025-03-13
Payer: MEDICAID

## 2025-03-13 ENCOUNTER — CLINICAL SUPPORT (OUTPATIENT)
Dept: BEHAVIORAL HEALTH | Facility: CLINIC | Age: 35
End: 2025-03-13
Payer: MEDICAID

## 2025-03-13 DIAGNOSIS — F31.9 BIPOLAR 1 DISORDER (MULTI): ICD-10-CM

## 2025-03-13 DIAGNOSIS — F41.9 ANXIETY: ICD-10-CM

## 2025-03-13 PROCEDURE — 90853 GROUP PSYCHOTHERAPY: CPT | Mod: AJ,HE,GT | Performed by: SOCIAL WORKER
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
## 2025-03-14 ENCOUNTER — DOCUMENTATION (OUTPATIENT)
Dept: BEHAVIORAL HEALTH | Facility: CLINIC | Age: 35
End: 2025-03-14
Payer: MEDICAID

## 2025-03-14 NOTE — PROGRESS NOTES
INTENSIVE OUTPATIENT PROGRAM MULTIDISCIPLINARY  TREATMENT PLAN & PROGRESS NOTE     Patient: Julisa Parnell  :1990 Age: 35    Student: No  Treatment Team Date:2025    MRN#[id number]    Psychiatrist: Dr. Chirag Palma  Date of IOP Admission: 2025  Ref by: Chen Holman           Week  1               Admission Scores: Did not complete  GLENROY 21:  MY:  BDI:   PHQ-9:  MAST: DAST: MDQ:   [place]:     Current Risk Assessment:  Suicidal Risk:      None: X    Ideation:       Plan:      Intent:      SI Plan with plan contracts for safety:     Hx of harming self:        Homicidal Risk:  None:  X Ideation:       Plan:      Intent:      HI Plan with means:                                     Hx of harming others:          Global Improvement    Clinical Global Impressions Rating Scale Of Illness:  6  1-No Illness Present   2-Minimal   3-Mild   4-Moderate   5-Marked   6-Severe  X 7-Very Severe     Diagnoses & ICD-10 Codes F31.9  Primary Diagnosis & Code: Bipolar disorder     Secondary Diagnosis & Code:   Psychosocial & Environmental Stressors:   Financial  insecurity :   MedicationX  Family/Home life: X  Work/School:X    Inadequacy of social support      Patient's Treatment Goals:            Date Initiated:            Progress Update:  25  1.  Attend and actively participate in IOP _3_ days/week for 3 hours per day   2025 Good X  Fair    Poor   Unclear   2.  Attend weekly medication management sessions and maintain compliance of medications  2025   Good    Fair X    Poor     Unclear                                                        2.  Identify symptoms of diagnoses and develop coping plans    2025  Good   Fair   X  Poor     Unclear                                                 3.  Increase illness education and symptom insight                  2025 Good   Fair X    Poor     Unclear        Current medications:  See EMR chart        Progress note:  _X_New to the  IOP program, attending as planned  __Compliant, Progressing & Improving - Needs more time in IOP therapy program   __Compliant, Progressing & Improving Planning Discharge   __Compliant, Not Progressing or Improving: Reason  __Non-Compliant, not progressing or improving: Plan  __Other: Pt resuming IOP after recent discharge from inpatient psychiatric unit.    Insurance & Benefits: Name of Insurance: [employer] Medicaid ,IN NETWORK, Yes    BENEFITS ARE BASED ON MEDICAL NECESSITY ONLY: Unlimited visits, covers 100 % of the contracted rate after deductible has been met.     Co-Pay per day: $0    DEDUCTIBLE        Single:  / Family: / Accumulation:   Single:  /  Family: /   CO- INSURANCE  Single:  / Family:  / Accumulation:  Single:  /  Family: /    OUT OF POCKET  Single:  / Family:  / Accumulation:  Single: /  Family: /       Total IOP Sessions completed & authorized to date:  1 (from this admission)    Discharge plans have been discussed with patient & Dr. SAULO Palma on:   Reason for discharge:    ___Successfully completed IOP treatment:   ___Pt. decided to seek treatment elsewhere:   ___Dropped out or no show more than three times:  ___Benefits exhausted:   ___Other:     Discharge date:                   Discharge Prognosis: Excellent      Good     Fair      Poor    Follow up appointment with: Physician:   Follow up appointment with: Therapist:    Follow up Aftercare group?  Yes __   No __     Patient provided with Crisis Hotline phone number for suicide prevention? Yes __ No __    Discharge Scores:   GLENROY 21:  MY:  BDI:   PHQ-9:  MAST: DAST: MDQ:   [place]:    Treatment plan electronically signed by Treatment Team:   SAWYER Palma MD, JOE Blackwell J. Wyse, LISW, K. Fogarty, PsyD

## 2025-03-17 ENCOUNTER — CLINICAL SUPPORT (OUTPATIENT)
Dept: BEHAVIORAL HEALTH | Facility: CLINIC | Age: 35
End: 2025-03-17
Payer: MEDICAID

## 2025-03-17 ENCOUNTER — APPOINTMENT (OUTPATIENT)
Dept: BEHAVIORAL HEALTH | Facility: CLINIC | Age: 35
End: 2025-03-17
Payer: MEDICAID

## 2025-03-17 DIAGNOSIS — F31.9 BIPOLAR 1 DISORDER (MULTI): ICD-10-CM

## 2025-03-17 DIAGNOSIS — F41.9 ANXIETY: ICD-10-CM

## 2025-03-17 PROCEDURE — 90853 GROUP PSYCHOTHERAPY: CPT | Mod: AJ,HE,GT | Performed by: SOCIAL WORKER

## 2025-03-17 NOTE — GROUP NOTE
Group Topic: Goals   Group Date: 3/13/2025  Start Time: 12:00 PM  End Time:  1:00 PM  Facilitators: CHARLIE George   Department: [facility]    Number of Participants: 7   Group Focus: goals  Treatment Modality: Cognitive Behavioral Therapy and Solution-Focused Therapy  Interventions utilized were group exercise  Purpose: coping skills and self-care    This was a video IOP group on a HIPAA compliant platform. All patients were provided with informed consent.     Date: 3/13/2025, Time: 12p-1p, Number of Patients: 7, User Name: [email or web address],  Number of Staff: 1  Group topic(s): Goal setting. Patients set goals for the weekend in the realms of enjoyment, achievement and closeness to others utilizing the SMART framework. Patients identified when they anticipate being vulnerable to an increase in symptoms of mood and anxiety disorders this weekend and created coping plans.    CHARLIE Hall-S      Name: Julisa Parnell YOB: 1990   MR: [id number]      Julisa shared about plans to attend a play with family, do laundry and paint her nails. Pt also discussed plans to journal to cope with symptoms.

## 2025-03-17 NOTE — GROUP NOTE
Group Topic: Feeling Awareness/Expression   Group Date: 3/13/2025  Start Time: 10:00 AM  End Time: 11:00 AM  Facilitators: Julia Austin PsyD   Department: [facility]    Number of Participants: 9   Group Focus: other Vulnerability  Treatment Modality: Psychoeducation  Interventions utilized were exploration and patient education  Purpose: feelings and insight or knowledge    This was a video IOP group on a HIPAA compliant platform. All patients were provided with informed consent.    Group topic:  Vulnerability  Group members reviewed topic of healthy vs unhealthy relationships and discussed how they have used this information to navigate their own relationships. They watched video from Yousif Brooks, The Power of Vulnerability. They processed personal experiences of vulnerability and explored benefits and risks of being vulnerable. Participants identified ways in which they numb to avoid discomfort.   Julia Austin Psy.D.        Name: Julisa Parnell YOB: 1990   MR: [id number]      Julisa was present and actively engaged in discussion. She processed vulnerability of giving birth and the challenges she has faced along the way. She shared what she liked about the video and noted that she specifically liked Dr. Brooks's ability to use humor.

## 2025-03-17 NOTE — PROGRESS NOTES
Met with pt via Zoom for 20 minutes for check in regarding IOP treatment goals and current well being.  Pt shared about the circumstances preceding her hospitalization. Pt shared one night she woke up and was in a dream like state and threw her boyfriend's clothes out of the window. Pt admitted she had been drinking vodka that evening and had a cup of homemade cannabis tea. Pt's neighbors called 911 and she was taken to [facility] and not admitted. The following day, pt was in a grocery store and couldn't find her phone. Pt stated she was having a panic attack and thought someone stole her phone. She went to a  urgent care and the staff called 911 due to pt appearing to be in a mental health crisis. Pt was then admitted to Opp shortly afterwards and was there for 7 days. Pt shared her medications were changed while in the hospital. She shared feeling foggy and tired yesterday but better physically today. Pt identified the impact of stress on these recent episodes- shared she had 4 funerals in 3 days. Pt wishes to continue with [facility] IOP. Pt goals include stabilizing her mood and anxiety symptoms enough to go back to work. Pt wants to better understand her triggers and decrease mood swings/irritability. Pt is not ready to completely abstain from alcohol but stated she is abstaining from hard liquor. Pt contemplative about returning to . Will continue to monitor should pt require HUY focused care. Pt will meet with Adriana Sidhu for individual therapy while in IOP.    CHARLIE George  ,  IOP

## 2025-03-17 NOTE — GROUP NOTE
Group Topic: Coping Skills   Group Date: 3/17/2025  Start Time: 11:00 AM  End Time: 12:00 PM  Facilitators: JOE Diggs   Department: [facility]        Name: Julisa Parnell YOB: 1990   MR: [id number]        This was a video IOP group on a HIPAA compliant platform. All patients were provided with informed consent.     Date: 03/17/25, Time: 11am-12pm group, Number of Patients: 10, User Name: [email or web address]  Number of Staff: 1    Group topic(s): Boundaries  Provided psychoeducation on personal boundaries. Shared with group boundary information worksheet. Discussed the difference between rigid, porous and healthy boundaries. Patients completing boundary exploration worksheet.  Patient shared becoming frustrated with her boyfriend and mother. Patient wants to improve her communication with them. Julisa was present, attentive.     Facilitator: JOE Waters

## 2025-03-17 NOTE — GROUP NOTE
Group Topic: Coping Skills   Group Date: 3/17/2025  Start Time: 12:00 PM  End Time:  1:00 PM  Facilitators: JOE Diggs   Department: [facility]        Name: Julisa Parnell YOB: 1990   MR: [id number]      This was a video IOP group on a HIPAA compliant platform. All patients were provided with informed consent.  Date: 03/17/25, Time: 12pm-1pm group, Number of Patients:10, User Name: JOE [email or web address]  Number of Staff: 2    Group topic(s): Boundaries continued   Patients given the opportunity to share responses from boundary exploration worksheet. Discussed ways to address boundaries violations and how to say no. Reviewed assertive communication skills. Julisa was present, attentive.     Facilitator: JOE Waters

## 2025-03-17 NOTE — GROUP NOTE
Group Topic: Feeling Awareness/Expression   Group Date: 3/17/2025  Start Time: 10:00 AM  End Time: 11:00 AM  Facilitators: CHARLIE George   Department: [facility]    Number of Participants: 10   Group Focus: check in  Treatment Modality: Solution-Focused Therapy  Interventions utilized were group exercise, story telling, and support  Purpose: coping skills and feelings    This was a video IOP group on a HIPAA compliant platform. All patients were provided with informed consent.     Date: 3/17/2025, Time: 10a-11a, Number of Patients: 10, User Name: [email or web address],  Number of Staff: 2  Group topic(s): Check in. Patients shared their highs and lows from the weekend as well as moments of growth or change. Patients introduced each other and group norms were reviewed by facilitator.    JOE Hall LISW-S      Name: Julisa Parnell YOB: 1990   MR: [id number]      Julisa shared initially that she had a difficult weekend and didn't feel like sharing. She then elected to share quite a bit. She identified that her relationship with her mother is becoming more difficult postpartum- they did attend a play together over the weekend which she enjoyed and felt it increased their closeness. Pt shared her birth story with a friend and shared it helped her process the emotions associated with it. She reflected on how traumatic her birth experience was and that she has been avoiding thinking about it.

## 2025-03-17 NOTE — GROUP NOTE
Group Topic: Feeling Awareness/Expression   Group Date: 3/13/2025  Start Time: 11:00 AM  End Time: 12:00 PM  Facilitators: Julia Austin PsyD   Department: [facility]    Number of Participants: 8   Group Focus: other Trust  Treatment Modality: Psychoeducation  Interventions utilized were exploration and patient education  Purpose: feelings and insight or knowledge    This was a video IOP group on a HIPAA compliant platform. All patients were provided with informed consent.    Group topic: Vulnerability and Trust  Group members received education on the components of trust. They explored how to use these components to build trust with themselves and others. Participants connected trust with ability to be vulnerable.   Julia Austin Psy.D.  Secondary facilitator: JOE Hall      Name: Julisa Parnell YOB: 1990   MR: [id number]      Julisa was present and actively engaged in discussion. She discussed trust related to vulnerability.

## 2025-03-18 ENCOUNTER — APPOINTMENT (OUTPATIENT)
Dept: BEHAVIORAL HEALTH | Facility: CLINIC | Age: 35
End: 2025-03-18
Payer: MEDICAID

## 2025-03-18 ENCOUNTER — CLINICAL SUPPORT (OUTPATIENT)
Dept: BEHAVIORAL HEALTH | Facility: CLINIC | Age: 35
End: 2025-03-18
Payer: MEDICAID

## 2025-03-18 DIAGNOSIS — F31.9 BIPOLAR 1 DISORDER (MULTI): ICD-10-CM

## 2025-03-18 PROCEDURE — 90853 GROUP PSYCHOTHERAPY: CPT | Mod: AJ,HE,GT | Performed by: SOCIAL WORKER

## 2025-03-18 NOTE — GROUP NOTE
Group Topic: Feeling Awareness/Expression   Group Date: 3/18/2025  Start Time: 11:00 AM  End Time: 12:00 PM  Facilitators: CHARLIE George   Department: [facility]    Number of Participants: 9   Group Focus: diagnosis education, feeling awareness/expression, and self-esteem  Treatment Modality: Psychoeducation  Interventions utilized were group exercise, patient education, and story telling  Purpose: feelings and self-worth    This was a video IOP group on a HIPAA compliant platform. All patients were provided with informed consent.     Date: 3/18/2025, Time: -12p, Number of Patients: 9, User Name: [email or web address],  Number of Staff: 2  Group topic(s): Body image and the  period. Patients discussed changes in body image they experienced throughout the  period. Engaged in a reflective exercise to practice body appreciation and gratitude. Patients also watched a brief video of postpartum women discussing their own experiences with body image and pregnancy.    JOE Hall LISW-S    Name: Julisa Fowler Parmjit YOB: 1990   MR: [id number]      Julisa shared that the conversation about body image was helpful for her but that she was distracted and off camera because she found out she has to move.

## 2025-03-18 NOTE — GROUP NOTE
Group Topic: Coping Skills   Group Date: 3/18/2025  Start Time: 10:00 AM  End Time: 11:00 AM  Facilitators: JOE Diggs   Department: [facility]        Name: Julisa Parnell YOB: 1990   MR: [id number]      This was a video IOP group on a HIPAA compliant platform. All patients were provided with informed consent.  Date: 03/18/2025, Time: 10am-11am group, Number of Patients:9 User Name: JOE valdovinos  Number of Staff: 2    Group topic(s): Returning to work, Mom guilt   Group discussion on mom guilt and balancing work, personal interests and parenting. Patients sharing what they imagine a “perfect mother” and “bad mother” to be. Shared the myth of a perfect mother by Gabby Burnham. Discussed societal expectations of women and gender roles. Patient shared being critical of herself. Julisa was present, attentive.     Facilitator: JOE Waters

## 2025-03-19 NOTE — GROUP NOTE
Group Topic: Feeling Awareness/Expression   Group Date: 3/18/2025  Start Time: 12:00 PM  End Time:  1:00 PM  Facilitators: CHARLIE George   Department: [facility]    Number of Participants: 9   Group Focus: coping skills, diagnosis education, and feeling awareness/expression  Treatment Modality: Psychoeducation  Interventions utilized were group exercise and patient education  Purpose: coping skills and feelings    This was a video IOP group on a HIPAA compliant platform. All patients were provided with informed consent.     Date: 3/18/2025, Time: 12p-1p, Number of Patients: 9, User Name: [email or web address],  Number of Staff: 2  Group topic(s): Continued discussion about body image and self-compassion. Patients received psychoeducation about eating disorders (signs and symptoms, diagnoses, etc.) in the context of discussion about body image in the  period. Patients then received psychoeducation about self-compassion- reviewed 3 pillars and patients were led in a guided meditation self-compassion exercise.    JOE Hall LISW-S      Name: Julisa Parnell YOB: 1990   MR: [id number]      Julisa was present, attentive.

## 2025-03-20 ENCOUNTER — APPOINTMENT (OUTPATIENT)
Dept: BEHAVIORAL HEALTH | Facility: CLINIC | Age: 35
End: 2025-03-20
Payer: MEDICAID

## 2025-03-21 ENCOUNTER — DOCUMENTATION (OUTPATIENT)
Dept: BEHAVIORAL HEALTH | Facility: CLINIC | Age: 35
End: 2025-03-21
Payer: MEDICAID

## 2025-03-21 ENCOUNTER — TELEMEDICINE (OUTPATIENT)
Dept: BEHAVIORAL HEALTH | Facility: CLINIC | Age: 35
End: 2025-03-21
Payer: MEDICAID

## 2025-03-21 ENCOUNTER — DOCUMENTATION (OUTPATIENT)
Dept: BEHAVIORAL HEALTH | Facility: CLINIC | Age: 35
End: 2025-03-21

## 2025-03-21 DIAGNOSIS — O99.343 BIPOLAR DISEASE DURING PREGNANCY IN THIRD TRIMESTER: ICD-10-CM

## 2025-03-21 DIAGNOSIS — F41.9 ANXIETY: ICD-10-CM

## 2025-03-21 DIAGNOSIS — I10 HYPERTENSION, UNSPECIFIED TYPE: ICD-10-CM

## 2025-03-21 DIAGNOSIS — M54.50 CHRONIC LOW BACK PAIN, UNSPECIFIED BACK PAIN LATERALITY, UNSPECIFIED WHETHER SCIATICA PRESENT: ICD-10-CM

## 2025-03-21 DIAGNOSIS — G89.29 CHRONIC LOW BACK PAIN, UNSPECIFIED BACK PAIN LATERALITY, UNSPECIFIED WHETHER SCIATICA PRESENT: ICD-10-CM

## 2025-03-21 DIAGNOSIS — F31.9 BIPOLAR 1 DISORDER (MULTI): ICD-10-CM

## 2025-03-21 DIAGNOSIS — F31.9 BIPOLAR DISEASE DURING PREGNANCY IN THIRD TRIMESTER: ICD-10-CM

## 2025-03-21 PROCEDURE — 99214 OFFICE O/P EST MOD 30 MIN: CPT | Performed by: STUDENT IN AN ORGANIZED HEALTH CARE EDUCATION/TRAINING PROGRAM

## 2025-03-21 RX ORDER — ACETAMINOPHEN 500 MG
500 TABLET ORAL EVERY 6 HOURS PRN
Qty: 100 TABLET | Refills: 0 | Status: SHIPPED | OUTPATIENT
Start: 2025-03-21

## 2025-03-21 RX ORDER — LITHIUM CARBONATE 300 MG/1
600 TABLET, FILM COATED, EXTENDED RELEASE ORAL EVERY EVENING
Qty: 60 TABLET | Refills: 2 | Status: SHIPPED | OUTPATIENT
Start: 2025-03-21 | End: 2025-06-19

## 2025-03-21 RX ORDER — AMLODIPINE BESYLATE 5 MG/1
5 TABLET ORAL DAILY
Qty: 30 TABLET | Refills: 0 | Status: SHIPPED | OUTPATIENT
Start: 2025-03-21

## 2025-03-21 NOTE — PROGRESS NOTES
INTENSIVE OUTPATIENT PROGRAM MULTIDISCIPLINARY  TREATMENT PLAN & PROGRESS NOTE     Patient: Julisa Parnell  :1990 Age: 35    Student: No  Treatment Team Date:2025    MRN#[id number]    Psychiatrist: Dr. Chirag Palma  Date of IOP Admission: 2025  Ref by: Chen Holman           Week  2            Admission Scores: Did not complete  GLENROY 21:  MY:  BDI:   PHQ-9:  MAST: DAST: MDQ:   [place]:     Current Risk Assessment:  Suicidal Risk:      None: X    Ideation:       Plan:      Intent:      SI Plan with plan contracts for safety:     Hx of harming self:        Homicidal Risk:  None:  X Ideation:       Plan:      Intent:      HI Plan with means:                                     Hx of harming others:          Global Improvement    Clinical Global Impressions Rating Scale Of Illness:  6  1-No Illness Present   2-Minimal   3-Mild   4-Moderate   5-Marked   6-Severe  X 7-Very Severe     Diagnoses & ICD-10 Codes F31.9  Primary Diagnosis & Code: Bipolar disorder     Secondary Diagnosis & Code:   Psychosocial & Environmental Stressors:   Financial  insecurity :   MedicationX  Family/Home life: X  Work/School:X    Inadequacy of social support      Patient's Treatment Goals:            Date Initiated:            Progress Update:  25  1.  Attend and actively participate in IOP _3_ days/week for 3 hours per day   2025 Good X  Fair    Poor   Unclear   2.  Attend weekly medication management sessions and maintain compliance of medications  2025   Good    Fair X    Poor     Unclear                                                        2.  Identify symptoms of diagnoses and develop coping plans    2025  Good   Fair   X  Poor     Unclear                                                 3.  Increase illness education and symptom insight                  2025 Good   Fair X    Poor     Unclear        Current medications:  See EMR chart        Progress note:  _X_New to the IOP  program, attending as planned  __Compliant, Progressing & Improving - Needs more time in IOP therapy program   __Compliant, Progressing & Improving Planning Discharge   __Compliant, Not Progressing or Improving: Reason  __Non-Compliant, not progressing or improving: Plan  __Other: Pt having difficult staying in engaged during group due to mental health symptoms. Patient not responding to staffs attempts to reach her to discuss POC.    Insurance & Benefits: Name of Insurance: [employer] ,IN NETWORK, Yes    BENEFITS ARE BASED ON MEDICAL NECESSITY ONLY: Unlimited visits, covers 100 % of the contracted rate after deductible has been met.     Co-Pay per day: $0    DEDUCTIBLE        Single:  / Family: / Accumulation:   Single:  /  Family: /   CO- INSURANCE  Single:  / Family:  / Accumulation:  Single:  /  Family: /    OUT OF POCKET  Single:  / Family:  / Accumulation:  Single: /  Family: /       Total IOP Sessions completed & authorized to date:  2 (from this admission)    Discharge plans have been discussed with patient & Dr. SAULO Palma on:   Reason for discharge:    ___Successfully completed IOP treatment:   ___Pt. decided to seek treatment elsewhere:   ___Dropped out or no show more than three times:  ___Benefits exhausted:   ___Other:     Discharge date:                   Discharge Prognosis: Excellent      Good     Fair      Poor    Follow up appointment with: Physician:   Follow up appointment with: Therapist:    Follow up Aftercare group?  Yes __   No __     Patient provided with Crisis Hotline phone number for suicide prevention? Yes __ No __    Discharge Scores:   GLENROY 21:  MY:  BDI:   PHQ-9:  MAST: DAST: MDQ:   [place]:    Treatment plan electronically signed by Treatment Team:   SAWYER Palma MD, JOE Blackwell, CHARLIE Busby K. Fogarty, PsyD

## 2025-03-21 NOTE — PROGRESS NOTES
Patient ID: Julisa Parnell is a 35 y.o. female who presents for Follow-up and Med Management.    History of Present Illness   35 year old female diagnosed with Bipolar 1 Disorder and now 7 months pp after delivery of first child. Recent admissions to  for francia during postpartum course for December 2024 and 10/7/14. She also recently completed PHP/IOP at [facility].    During hospitalization in December 2024, Latuda was stopped and she was started on Invega and then later Depakote. Pt likes the Invega and thinks it has been helpful. However only taking Depakote 2/7 days due to side effects (too sedating for patient).    Pt reports that she was diagnosed with bipolar disorder type 1 years ago but that in past has struggled mostly with breakthrough manic/hypomanic episodes. She has been depressed before but never to this extent.     Reports feeling very down most days. Appetite is also down which has lead to quite a bit of weight loss. Low energy and motivation. Very hard to get out of bed, daily routines. Anxiety is very high, afraid to even leave the house with baby. Denies SI. Sleep has not been an issue.     Has been having panic attacks, several a week when in public.     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     Taking Vistaril 1-2 times per day which has been a little helpful, takes the edge off.   Denies current SI/HI/AVH    Psychiatric History:  Numerous lifetime hospitalization for francia/hypomania, most recent in 12/2024  Reports having tried many medications in past including Geodon (too sedating), Latuda (had breakthough francia), lithium (does not recall response), VPA (too sedating)     Social History:  Lives with partner/FOB  First baby     Family History:  Did not obtain     ROS  Psych - see HPI  Other systems reviewed and negative unless otherwise specified     Mental Status Exam  Appearance:  "Neatly dressed, well groomed.  Attitude: cooperative  Behavior: Good eye contact  Motor Activity: No agitation or retardation. Normal gait.  Speech: Normal volume, rate, and rhythm, and tone. Spontaneous and fluent.  Mood: \"not so good\"  Affect: anxious, labile   Thought Process: Organized, logical, coherent. No looseness of associations or circumstantiality.  Thought Content: Does not endorse suicidal or homicidal ideation, no delusions elicited.  Thought Perception: Does not endorse auditory or visual hallucinations, does not appear to be responding to hallucinatory stimuli.  Cognition: Awake, alert, oriented x 3. Attention is fair.   Insight: good  Judgment: good    Lab Review:   Admission on 03/04/2025, Discharged on 03/05/2025   Component Date Value    WBC 03/04/2025 8.6     nRBC 03/04/2025 0.0     RBC 03/04/2025 4.05     Hemoglobin 03/04/2025 11.8 (L)     Hematocrit 03/04/2025 36.1     MCV 03/04/2025 89     MCH 03/04/2025 29.1     MCHC 03/04/2025 32.7     RDW 03/04/2025 13.3     Platelets 03/04/2025 262     Neutrophils % 03/04/2025 62.5     Immature Granulocytes %,* 03/04/2025 0.1     Lymphocytes % 03/04/2025 25.7     Monocytes % 03/04/2025 8.1     Eosinophils % 03/04/2025 3.3     Basophils % 03/04/2025 0.3     Neutrophils Absolute 03/04/2025 5.36     Immature Granulocytes Ab* 03/04/2025 0.01     Lymphocytes Absolute 03/04/2025 2.21     Monocytes Absolute 03/04/2025 0.70     Eosinophils Absolute 03/04/2025 0.28     Basophils Absolute 03/04/2025 0.03     Glucose 03/04/2025 114 (H)     Sodium 03/04/2025 141     Potassium 03/04/2025 4.8     Chloride 03/04/2025 105     Bicarbonate 03/04/2025 27     Anion Gap 03/04/2025 14     Urea Nitrogen 03/04/2025 17     Creatinine 03/04/2025 1.15 (H)     eGFR 03/04/2025 64     Calcium 03/04/2025 9.9     Albumin 03/04/2025 4.7     Alkaline Phosphatase 03/04/2025 80     Total Protein 03/04/2025 7.2     AST 03/04/2025 36     Bilirubin, Total 03/04/2025 0.4     ALT 03/04/2025 28  "    Alcohol 03/04/2025 <10     Amphetamine Screen, Urine 03/04/2025 Presumptive Negative     Barbiturate Screen, Urine 03/04/2025 Presumptive Negative     Benzodiazepines Screen, * 03/04/2025 Presumptive Negative     Cannabinoid Screen, Urine 03/04/2025 Presumptive Negative     Cocaine Metabolite Scree* 03/04/2025 Presumptive Negative     Fentanyl Screen, Urine 03/04/2025 Presumptive Negative     Opiate Screen, Urine 03/04/2025 Presumptive Negative     Oxycodone Screen, Urine 03/04/2025 Presumptive Negative     PCP Screen, Urine 03/04/2025 Presumptive Negative     Methadone Screen, Urine 03/04/2025 Presumptive Negative     HCG, Beta-Quantitative 03/04/2025 <2    Admission on 03/03/2025, Discharged on 03/03/2025   Component Date Value    WBC 03/03/2025 11.0     nRBC 03/03/2025 0.0     RBC 03/03/2025 4.26     Hemoglobin 03/03/2025 12.6     Hematocrit 03/03/2025 36.3     MCV 03/03/2025 85     MCH 03/03/2025 29.6     MCHC 03/03/2025 34.7     RDW 03/03/2025 13.1     Platelets 03/03/2025 282     Neutrophils % 03/03/2025 73.6     Immature Granulocytes %,* 03/03/2025 0.3     Lymphocytes % 03/03/2025 14.0     Monocytes % 03/03/2025 10.8     Eosinophils % 03/03/2025 0.8     Basophils % 03/03/2025 0.5     Neutrophils Absolute 03/03/2025 8.08 (H)     Immature Granulocytes Ab* 03/03/2025 0.03     Lymphocytes Absolute 03/03/2025 1.54     Monocytes Absolute 03/03/2025 1.19 (H)     Eosinophils Absolute 03/03/2025 0.09     Basophils Absolute 03/03/2025 0.05     Glucose 03/03/2025 100 (H)     Sodium 03/03/2025 137     Potassium 03/03/2025 3.9     Chloride 03/03/2025 103     Bicarbonate 03/03/2025 24     Anion Gap 03/03/2025 14     Urea Nitrogen 03/03/2025 18     Creatinine 03/03/2025 1.00     eGFR 03/03/2025 76     Calcium 03/03/2025 10.0     Albumin 03/03/2025 4.9     Bilirubin, Total 03/03/2025 0.6     Bilirubin, Direct 03/03/2025 0.1     Alkaline Phosphatase 03/03/2025 76     ALT 03/03/2025 27     AST 03/03/2025 42 (H)     Total  Protein 03/03/2025 7.6     HCG, Beta-Quantitative 03/03/2025 <2     Acetaminophen 03/03/2025 <10.0     Salicylate  03/03/2025 <3     Alcohol 03/03/2025 <10     Color, Urine 03/03/2025 Colorless (N)     Appearance, Urine 03/03/2025 Clear     Specific Gravity, Urine 03/03/2025 1.003 (N)     pH, Urine 03/03/2025 7.0     Protein, Urine 03/03/2025 NEGATIVE     Glucose, Urine 03/03/2025 Normal     Blood, Urine 03/03/2025 0.2 (2+) (A)     Ketones, Urine 03/03/2025 NEGATIVE     Bilirubin, Urine 03/03/2025 NEGATIVE     Urobilinogen, Urine 03/03/2025 Normal     Nitrite, Urine 03/03/2025 NEGATIVE     Leukocyte Esterase, Urine 03/03/2025 NEGATIVE     WBC, Urine 03/03/2025 NONE     RBC, Urine 03/03/2025 NONE     Squamous Epithelial Cell* 03/03/2025 1-9 (SPARSE)        Assessment/Plan  Assessment:  36 yo with Bipolar disorder type 1, PP 7 months with first child, currently meets criteria for PPD. Patient has had numerous lifetime hospitalizations for francia/hypomania, most recent in 12/2024. She is currently presenting with moderate to severe depression characterized by low motivation, depressed mood, decreased appetite/weight loss, and social isolation. Anxiety is very high and she has been experiencing panic attacks when she leaves the house and when in crowds. She reports having experienced more francia episodes compared to depressive episodes over the year. 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     Pt currently had medications changed when inpatient in 12/2024; Latuda was stopped and she was started on Invega and subtherapeutic dose of VPA; she has not been adherent with the latter due to side effects namely sedation.     Discussed options with patient. Given francia has been predominate presentation, Lithium likely to be better option compared to Lamictal. Also has advantage of faster onset of action and some efficacy in treating acute depression.     Had recent thyroid studies which were WNL. Serum creatinine in  12/2024 0.81 and no known history of kidney disease.     Impression:  Bipolar disorder type 1, current episode depressed   Social anxiety; r/o SHIRAZ     Plan:   - Start Lithium  mg BID. Will obtain labs in 7-10 days to check serum levels and monitor Cr.   - Continue Invega 9 am QAM   - Continue hydroxyzine 25 mg BID PRN anxiety  - Continue Zoloft 50 mg daily for now to target anxiety and depression. However given patient's history of SSRI induced francia and reservations about being on SSRI, may consider changing to Buspirone at next appointment.   - Appropriate to start IOP     FU in 1 week, sooner if needed.

## 2025-03-24 ENCOUNTER — APPOINTMENT (OUTPATIENT)
Dept: BEHAVIORAL HEALTH | Facility: CLINIC | Age: 35
End: 2025-03-24
Payer: MEDICAID

## 2025-03-25 ENCOUNTER — DOCUMENTATION (OUTPATIENT)
Dept: BEHAVIORAL HEALTH | Facility: CLINIC | Age: 35
End: 2025-03-25
Payer: MEDICAID

## 2025-03-25 ENCOUNTER — APPOINTMENT (OUTPATIENT)
Dept: BEHAVIORAL HEALTH | Facility: CLINIC | Age: 35
End: 2025-03-25
Payer: MEDICAID

## 2025-03-25 DIAGNOSIS — F41.9 ANXIETY: ICD-10-CM

## 2025-03-25 DIAGNOSIS — F31.9 BIPOLAR 1 DISORDER (MULTI): ICD-10-CM

## 2025-03-25 PROCEDURE — 90853 GROUP PSYCHOTHERAPY: CPT | Mod: AJ,HE,GT | Performed by: SOCIAL WORKER

## 2025-03-25 NOTE — GROUP NOTE
Group Topic: Coping Skills   Group Date: 3/25/2025  Start Time: 12:00 PM  End Time:  1:00 PM  Facilitators: JOE Diggs   Department: [facility]        Name: Julisa Parnell YOB: 1990   MR: [id number]        This was a video IOP group on a HIPAA compliant platform. All patients were provided with informed consent.  Date: 03/25/25/25, Time: 12pm-1pm group, Number of Patients:8, User Name: JOE [email or web address]  Number of Staff: 2    Group topic(s): Negative thoughts continued  Reviewed ways to challenge the truth of negative thoughts (what makes its it true/not true). Spoke with patients about self-compassion and talking to themselves like they would a friend. Discussed general themes of negative thoughts and images. Spoke with patients about making small behavioral changes to stop the cycle of negative thoughts. Suggested ways of relabeling and reattributing negative thoughts. Juilsa was present, attentive.     Facilitator: JOE Waters  Co-Facilitator: JOE Vazquez

## 2025-03-25 NOTE — GROUP NOTE
Group Topic: Coping Skills   Group Date: 3/25/2025  Start Time: 11:00 AM  End Time: 12:00 PM  Facilitators: JOE Diggs   Department: [facility]        Name: Julisa Parnell YOB: 1990   MR: [id number]        This was a video IOP group on a HIPAA compliant platform. All patients were provided with informed consent.     Date: 03/25/25, Time: 11am-12pm group, Number of Patients: 9, User Name: [email or web address]  Number of Staff: 2    Group topic(s): CBT triangle, managing negative thoughts and behaviors   Provided psychoeducation on Cognitive behavioral therapy. Shared CBT triangle with group and explained the cycle of depression and anxiety. Discussed changing behavior, pleasurable activities, socializing and physical exercise. Patients given the opportunity to ask questions and provide feedback. Julisa was present, attentive.     Facilitator: JOE Waters

## 2025-03-25 NOTE — GROUP NOTE
Group Topic: Coping Skills   Group Date: 3/25/2025  Start Time: 10:00 AM  End Time: 11:00 AM  Facilitators: JOE Diggs   Department: [facility]        Name: Candidotevin Deedee Janethclara Parnell YOB: 1990   MR: [id number]        This was a video IOP group on a HIPAA compliant platform. All patients were provided with informed consent.  Date: 03/25/25, Time: 10am-11am group, Number of Patients:9, User Name: JOE [email or web address]  Number of Staff: 1    Group topic(s): Attachment and bonding   Provided psychoeducation on attachment and bonding. Shared ways to increase bonding/attachment with baby. Patients sharing ways they connect with their pregnancy and babies. Julisa was present, attentive.     Facilitator: JOE Waters
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
## 2025-03-26 NOTE — PROGRESS NOTES
Provider checked in with pt via Zoom for 20 minutes regarding IOP treatment plan and goals. Pt identified goals including identifying triggers for her mood and anxiety disorders and continuing to adjust her medications to an optimum benefit for her symptoms. 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 Pt discussed stressors including housing and relationships. Discussed with pt idea of attending an in person IOP vs. Virtual due to pt being off camera a lot but pt is not interested at this time, feels she is getting benefit from [facility] IOP. Will continue to check in with pt regarding plan of care and progress on goals.  CHARLIE George  ,  IOP

## 2025-03-27 ENCOUNTER — APPOINTMENT (OUTPATIENT)
Dept: BEHAVIORAL HEALTH | Facility: CLINIC | Age: 35
End: 2025-03-27
Payer: MEDICAID

## 2025-03-27 DIAGNOSIS — F31.9 BIPOLAR 1 DISORDER (MULTI): ICD-10-CM

## 2025-03-27 DIAGNOSIS — F41.9 ANXIETY: ICD-10-CM

## 2025-03-27 PROCEDURE — 90853 GROUP PSYCHOTHERAPY: CPT | Mod: AJ,HE,GT | Performed by: SOCIAL WORKER

## 2025-03-27 NOTE — GROUP NOTE
Group Topic: Coping Skills   Group Date: 3/27/2025  Start Time: 12:00 PM  End Time:  1:00 PM  Facilitators: JOE Diggs   Department: [facility]        Name: Julisa Parnell YOB: 1990   MR: [id number]      This was a video IOP group on a HIPAA compliant platform. All patients were provided with informed consent.  Date: 03/27/25/25, Time: 12pm-1pm group, Number of Patients:10, User Name: JOE [email or web address]  Number of Staff: 1    Group topic(s): Weekend Smart Goals  Weekend goal setting. Patients set SMART goals surrounding enjoyment, achievement and closeness to others. Also discussed times they anticipate being vulnerable during the weekend to increased symptoms and coping strategies/supports. Patient's given the opportunity to share words of encouragement to discharging group members. Patient would like to get a message this weekend. Julisa was present, attentive.     Facilitator: JOE Waters

## 2025-03-28 ENCOUNTER — DOCUMENTATION (OUTPATIENT)
Dept: BEHAVIORAL HEALTH | Facility: CLINIC | Age: 35
End: 2025-03-28
Payer: MEDICAID

## 2025-03-28 NOTE — PROGRESS NOTES
INTENSIVE OUTPATIENT PROGRAM MULTIDISCIPLINARY  TREATMENT PLAN & PROGRESS NOTE     Patient: Julisa Parnell  :1990 Age: 35    Student: No  Treatment Team Date:2025    MRN#[id number]    Psychiatrist: Dr. Chirag Palma  Date of IOP Admission: 2025  Ref by: Chen Holman           Week  3            Admission Scores: Did not complete  GLENROY 21:  MY:  BDI:   PHQ-9:  MAST: DAST: MDQ:   [place]:     Current Risk Assessment:  Suicidal Risk:      None: X    Ideation:       Plan:      Intent:      SI Plan with plan contracts for safety:     Hx of harming self:        Homicidal Risk:  None:  X Ideation:       Plan:      Intent:      HI Plan with means:                                     Hx of harming others:          Global Improvement    Clinical Global Impressions Rating Scale Of Illness:  6  1-No Illness Present   2-Minimal   3-Mild   4-Moderate   5-Marked   6-Severe  X 7-Very Severe     Diagnoses & ICD-10 Codes F31.9  Primary Diagnosis & Code: Bipolar disorder     Secondary Diagnosis & Code:   Psychosocial & Environmental Stressors:   Financial  insecurity :   MedicationX  Family/Home life: X  Work/School:X    Inadequacy of social support      Patient's Treatment Goals:            Date Initiated:            Progress Update:  25  1.  Attend and actively participate in IOP _3_ days/week for 3 hours per day   2025 Good X  Fair    Poor   Unclear   2.  Attend weekly medication management sessions and maintain compliance of medications  2025   Good    Fair X    Poor     Unclear                                                        2.  Identify symptoms of diagnoses and develop coping plans    2025  Good   Fair   X  Poor     Unclear                                                 3.  Increase illness education and symptom insight                  2025 Good   Fair X    Poor     Unclear        Current medications:  See EMR chart        Progress note:  __New to the IOP  program, attending as planned  __Compliant, Progressing & Improving - Needs more time in IOP therapy program   __Compliant, Progressing & Improving Planning Discharge   __Compliant, Not Progressing or Improving: Reason  __Non-Compliant, not progressing or improving: Plan  _X_Other: Pt will discharge from [facility] after next week. Patient will attend in person [facility] through [place]. Patient agreeable to this plan.     Insurance & Benefits: Name of Insurance: [employer] Medicaid ,IN NETWORK, Yes    BENEFITS ARE BASED ON MEDICAL NECESSITY ONLY: Unlimited visits, covers 100 % of the contracted rate after deductible has been met.     Co-Pay per day: $0    DEDUCTIBLE        Single:  / Family: / Accumulation:   Single:  /  Family: /   CO- INSURANCE  Single:  / Family:  / Accumulation:  Single:  /  Family: /    OUT OF POCKET  Single:  / Family:  / Accumulation:  Single: /  Family: /       Total IOP Sessions completed & authorized to date:  4(from this admission)    Discharge plans have been discussed with patient & Dr. SAULO Palma on:   Reason for discharge:    ___Successfully completed IOP treatment:   ___Pt. decided to seek treatment elsewhere:   ___Dropped out or no show more than three times:  ___Benefits exhausted:   ___Other:     Discharge date:                   Discharge Prognosis: Excellent      Good     Fair      Poor    Follow up appointment with: Physician:   Follow up appointment with: Therapist:    Follow up Aftercare group?  Yes __   No __     Patient provided with Crisis Hotline phone number for suicide prevention? Yes __ No __    Discharge Scores:   GLENROY 21:  MY:  BDI:   PHQ-9:  MAST: DAST: MDQ:   [place]:    Treatment plan electronically signed by Treatment Team:   SAWYER Palma MD, JOE Blackwell, CHARLIE Busby K. Fogarty, PsyD

## 2025-03-31 ENCOUNTER — DOCUMENTATION (OUTPATIENT)
Dept: BEHAVIORAL HEALTH | Facility: CLINIC | Age: 35
End: 2025-03-31
Payer: MEDICAID

## 2025-03-31 ENCOUNTER — CLINICAL SUPPORT (OUTPATIENT)
Dept: BEHAVIORAL HEALTH | Facility: CLINIC | Age: 35
End: 2025-03-31
Payer: MEDICAID

## 2025-03-31 DIAGNOSIS — F31.9 BIPOLAR 1 DISORDER (MULTI): ICD-10-CM

## 2025-03-31 DIAGNOSIS — F41.9 ANXIETY: ICD-10-CM

## 2025-03-31 PROCEDURE — 90853 GROUP PSYCHOTHERAPY: CPT | Mod: AJ,HE,GT | Performed by: SOCIAL WORKER
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
## 2025-03-31 NOTE — PROGRESS NOTES
I saw and evaluated the patient. I personally obtained the key and critical portions of the history and physical exam or was physically present for key and critical portions performed by the resident/fellow. I reviewed the resident/fellow's documentation and discussed the patient with the resident/fellow. I agree with the resident/fellow's medical decision making as documented in the note.    Chirag Palma MD

## 2025-03-31 NOTE — GROUP NOTE
Group Topic: Self Esteem   Group Date: 3/27/2025  Start Time: 10:00 AM  End Time: 11:00 AM  Facilitators: Julia Austin PsyD   Department: [facility]    Number of Participants: 9   Group Focus: other Self-worth  Treatment Modality: Psychoeducation  Interventions utilized were exploration and patient education  Purpose: self-worth    This was a video IOP group on a HIPAA compliant platform. All patients were provided with informed consent.    Group topic: Self-worth   Group members received education on definitions of self-worth and self-esteem. They explored what has influenced their self-worth and identified externals they have based their worth on. They defined the basics of human worth.   Julia Austin Psy.D.  Secondary facilitator: JOE Waters      Name: Julisa Parnell YOB: 1990   MR: [id number]      Julisa was present and actively engaged in discussion. She shared how energy levels influence her view of self-worth. She discussed how her productivity changes from day to day and how parenting has influenced this, which in turn influences her self-worth.

## 2025-03-31 NOTE — GROUP NOTE
Group Topic: Coping Skills   Group Date: 3/31/2025  Start Time: 10:00 AM  End Time: 11:00 AM  Facilitators: JOE Diggs   Department: [facility]        Name: Julisa Fowler Parmjit YOB: 1990   MR: [id number]      This was a video IOP group on a HIPAA compliant platform. All patients were provided with informed consent.  Date: 03/31/25, Time: 10am-11am group, Number of Patients:5, User Name: JOE [email or web address]  Number of Staff: 1    Group topic(s): Check in (Deedee, Vasile, Moody), Behavioral Activation   Patients shared their highs and lows from the weekend as well as moments of growth or change. Asked patients to identify a weekly intention. Discussed the CBT triangle and the components of behavioral activation. Reviewed the cycles of depression and asked patients to identify their own negative cycles. Julisa was present, attentive.     Facilitator: JOE Waters

## 2025-03-31 NOTE — GROUP NOTE
Group Topic: Coping Skills   Group Date: 3/31/2025  Start Time: 12:00 PM  End Time:  1:00 PM  Facilitators: JOE Diggs   Department: [facility]        Name: Julisa Parnell YOB: 1990   MR: [id number]      This was a video IOP group on a HIPAA compliant platform. All patients were provided with informed consent.  Date: 03/31/25/25, Time: 12pm-1pm group, Number of Patients:5, User Name: JOE [email or web address]  Number of Staff: 1    Group topic(s): Automatic negative thoughts, cognitive distortions   Played educational videos on automatic negative thoughts and ways to reframe negative thoughts. Patients completing challenging negative thoughts worksheet. Patients given the opportunity to share responses. Reviewed types of cognitive distortions. Julisa was present, attentive.     Facilitator: JOE Waters

## 2025-03-31 NOTE — GROUP NOTE
Group Topic: Coping Skills   Group Date: 3/31/2025  Start Time: 11:00 AM  End Time: 12:00 PM  Facilitators: JOE Diggs   Department: [facility]        Name: Julisa Parnell YOB: 1990   MR: [id number]      This was a video IOP group on a HIPAA compliant platform. All patients were provided with informed consent.     Date: 03/31/25, Time: 11am-12pm group, Number of Patients: 5, User Name: [email or web address]  Number of Staff: 1    Group topic(s): Behavioral activation continued   Provided psychoeducation on values, mastery and pleasurable activities. Patients completing weekly scheduled for behavioral activation.  Reviewed healthy vs unhealthy coping strategies. Julisa was present, attentive.     Facilitator: JOE Waters

## 2025-03-31 NOTE — GROUP NOTE
Group Topic: Self Esteem   Group Date: 3/27/2025  Start Time: 11:00 AM  End Time: 12:00 PM  Facilitators: Julia Austin PsyD   Department: [facility]    Number of Participants: 10   Group Focus: other self-worth  Treatment Modality: Psychoeducation  Interventions utilized were exploration and patient education  Purpose: self-worth    This was a video IOP group on a HIPAA compliant platform. All patients were provided with informed consent.    Group topic: Self-worth   Group members received education on strategies for increasing self-worth. They discussed ways to increase self-understanding, boost self-acceptance and practice self-love. They answered questions related to identifying positive traits.   Julia Austin Psy.D.  Secondary facilitator: JOE Waters      Name: Julisa Parnell YOB: 1990   MR: [id number]      Julisa was present and actively engaged in discussion. She shared how setting boundaries is a significant part of practicing self-love. She processed how her boundaries shift over time and look different now compared to when she was giving birth.

## 2025-04-01 ENCOUNTER — APPOINTMENT (OUTPATIENT)
Dept: BEHAVIORAL HEALTH | Facility: CLINIC | Age: 35
End: 2025-04-01
Payer: MEDICAID

## 2025-04-03 ENCOUNTER — CLINICAL SUPPORT (OUTPATIENT)
Dept: BEHAVIORAL HEALTH | Facility: CLINIC | Age: 35
End: 2025-04-03
Payer: MEDICAID

## 2025-04-03 ENCOUNTER — APPOINTMENT (OUTPATIENT)
Dept: OPHTHALMOLOGY | Facility: CLINIC | Age: 35
End: 2025-04-03
Payer: MEDICAID

## 2025-04-03 DIAGNOSIS — F41.9 ANXIETY: ICD-10-CM

## 2025-04-03 DIAGNOSIS — F31.9 BIPOLAR 1 DISORDER (MULTI): ICD-10-CM

## 2025-04-03 PROCEDURE — 90853 GROUP PSYCHOTHERAPY: CPT | Mod: AJ,HE,GT | Performed by: SOCIAL WORKER

## 2025-04-03 NOTE — GROUP NOTE
Group Topic: Goals   Group Date: 4/3/2025  Start Time: 12:00 PM  End Time:  1:00 PM  Facilitators: CHARLIE George   Department: [facility]    Number of Participants: 5   Group Focus: goals  Treatment Modality: Solution-Focused Therapy  Interventions utilized were group exercise  Purpose: coping skills    This was a video IOP group on a HIPAA compliant platform. All patients were provided with informed consent.     Date: 4/3/2025, Time: 12p-1, Number of Patients: , User Name: [email or web address],  Number of Staff: 1  Group topic(s): Weekend goal setting. Patients set SMART goals surrounding enjoyment, achievement and closeness to others. Also discussed times they anticipate being vulnerable during the weekend to increased symptoms and coping strategies/supports.    CHARLIE Hall-S    Name: Julisa Parnell YOB: 1990   MR: [id number]      Julisa shared about the positive impact of IOP on her life- she shared she has learned the importance of balancing her own needs/self-care and her baby's needs. Pt also shared she is going to continue with individual therapy and the importance of managing her mood disorder. Pt set goals to grocery shop, spend time with friends and journal.

## 2025-04-04 ENCOUNTER — DOCUMENTATION (OUTPATIENT)
Dept: BEHAVIORAL HEALTH | Facility: CLINIC | Age: 35
End: 2025-04-04
Payer: MEDICAID

## 2025-04-04 NOTE — PROGRESS NOTES
INTENSIVE OUTPATIENT PROGRAM MULTIDISCIPLINARY  TREATMENT PLAN & PROGRESS NOTE     Patient: Julisa Parnell  :1990 Age: 35    Student: No  Treatment Team Date:2025    MRN#[id number]    Psychiatrist: Dr. Chirag Palma  Date of IOP Admission: 2025  Ref by: Chen Holman           Week  4            Admission Scores: Did not complete  GLENROY 21:  MY:  BDI:   PHQ-9:  MAST: DAST: MDQ:   [place]:     Current Risk Assessment:  Suicidal Risk:      None: X    Ideation:       Plan:      Intent:      SI Plan with plan contracts for safety:     Hx of harming self:        Homicidal Risk:  None:  X Ideation:       Plan:      Intent:      HI Plan with means:                                     Hx of harming others:          Global Improvement    Clinical Global Impressions Rating Scale Of Illness:  6  1-No Illness Present   2-Minimal   3-Mild   4-Moderate   5-Marked   6-Severe  X 7-Very Severe     Diagnoses & ICD-10 Codes F31.9  Primary Diagnosis & Code: Bipolar disorder     Secondary Diagnosis & Code:   Psychosocial & Environmental Stressors:   Financial  insecurity :   MedicationX  Family/Home life: X  Work/School:X    Inadequacy of social support      Patient's Treatment Goals:            Date Initiated:            Progress Update:  25  1.  Attend and actively participate in IOP _3_ days/week for 3 hours per day   2025 Good   Fair X   Poor   Unclear   2.  Attend weekly medication management sessions and maintain compliance of medications  2025   Good    Fair X    Poor     Unclear                                                        2.  Identify symptoms of diagnoses and develop coping plans    2025  Good   Fair   Poor     Unclear     X                                            3.  Increase illness education and symptom insight                  2025 Good   Fair X    Poor     Unclear  X      Current medications:  See EMR chart        Progress note:  __New to the IOP  program, attending as planned  __Compliant, Progressing & Improving - Needs more time in IOP therapy program   __Compliant, Progressing & Improving Planning Discharge   __Compliant, Not Progressing or Improving: Reason  __Non-Compliant, not progressing or improving: Plan  _X_Other: Pt discharged from [facility] this week. Patient will attend in person [facility] through [place]. Patient will continue medication management with Margareth Alvarado. Patient given information for [facility] for individual therapy and case management services. Patient requesting to call Flor herself.     Insurance & Benefits: Name of Insurance: [employer] Medicaid ,IN NETWORK, Yes    BENEFITS ARE BASED ON MEDICAL NECESSITY ONLY: Unlimited visits, covers 100 % of the contracted rate after deductible has been met.     Co-Pay per day: $0    DEDUCTIBLE        Single:  / Family: / Accumulation:   Single:  /  Family: /   CO- INSURANCE  Single:  / Family:  / Accumulation:  Single:  /  Family: /    OUT OF POCKET  Single:  / Family:  / Accumulation:  Single: /  Family: /       Total IOP Sessions completed & authorized to date:  6(from this admission)    Discharge plans have been discussed with patient & Dr. SAULO Palma on:   Reason for discharge:    ___Successfully completed IOP treatment:   ___Pt. decided to seek treatment elsewhere:   ___Dropped out or no show more than three times:  ___Benefits exhausted:   ___Other:     Discharge date:  04/03/25                Discharge Prognosis: Excellent      Good     Fair    X  Poor    Follow up appointment with: Physician: Margareth Alvarado   Follow up appointment with: Therapist: Yelena Lopez-patient will call to schedule   Follow up Aftercare group?  Yes __   No _C_     Patient provided with Crisis Hotline phone number for suicide prevention? Yes __ No __C    Discharge Scores:   GLENROY 21:  MY:  BDI:   PHQ-9:  MAST: DAST: MDQ:   [place]:    Treatment plan electronically signed by Treatment  Team:   SAWYER Palma MD, JOE Blackwell, CHARLIE Busby, KAYLAN Austin PsyD

## 2025-04-07 ENCOUNTER — APPOINTMENT (OUTPATIENT)
Dept: BEHAVIORAL HEALTH | Facility: CLINIC | Age: 35
End: 2025-04-07
Payer: MEDICAID

## 2025-04-07 NOTE — GROUP NOTE
Group Topic: Self Esteem   Group Date: 4/3/2025  Start Time: 10:00 AM  End Time: 11:00 AM  Facilitators: Julia Austin PsyD   Department: [facility]    Number of Participants: 7   Group Focus: self-esteem  Treatment Modality: Psychoeducation  Interventions utilized were patient education  Purpose: other: Self-esteem building    This was a video IOP group on a HIPAA compliant platform. All patients were provided with informed consent.    Group topic: Self-esteem   Group members received education on elements of self-esteem. They reviewed strategies for increasing self-esteem and explored how to connect these strategies with their goals. Participants explored what influences their self-esteem.   Julia Austin Psy.D.        Name: Julisa Parnell YOB: 1990   MR: [id number]      Julisa was present and actively engaged in discussion. She stated that she has been working on writing down her accomplishments to help increase her self-esteem. She noted that she has been more mindful about not defining her worth by how much energy she has.

## 2025-04-07 NOTE — GROUP NOTE
"Group Topic: Feeling Awareness/Expression   Group Date: 4/3/2025  Start Time: 11:00 AM  End Time: 12:00 PM  Facilitators: Julia Austin PsyD   Department: [facility]    Number of Participants: 5   Group Focus: other Values  Treatment Modality: Psychoeducation  Interventions utilized were exploration and patient education  Purpose: insight or knowledge and other: Values    This was a video IOP group on a HIPAA compliant platform. All patients were provided with informed consent.    Group topic: Values  Group members created definition of values. They identified what they find meaningful and important in their lives and explored how their current activity aligns with their values. Participants processed how their values have changed over time and began creating goals in line with their values.   Julia Austin Psy.D.        Name: Julisa Parnell YOB: 1990   MR: [id number]      Julisa was present and actively engaged in discussion. She was somewhat distracted during group and stated that she \"has a lot going on,\" which led her to being on and off camera. She chose not to share when asked about her values.         "

## 2025-04-08 ENCOUNTER — APPOINTMENT (OUTPATIENT)
Dept: BEHAVIORAL HEALTH | Facility: CLINIC | Age: 35
End: 2025-04-08
Payer: MEDICAID

## 2025-04-10 ENCOUNTER — APPOINTMENT (OUTPATIENT)
Dept: BEHAVIORAL HEALTH | Facility: CLINIC | Age: 35
End: 2025-04-10
Payer: MEDICAID

## 2025-04-14 ENCOUNTER — APPOINTMENT (OUTPATIENT)
Dept: BEHAVIORAL HEALTH | Facility: CLINIC | Age: 35
End: 2025-04-14
Payer: MEDICAID

## 2025-04-15 ENCOUNTER — APPOINTMENT (OUTPATIENT)
Dept: BEHAVIORAL HEALTH | Facility: CLINIC | Age: 35
End: 2025-04-15
Payer: MEDICAID

## 2025-04-16 LAB
ATRIAL RATE: 100 BPM
P AXIS: 56 DEGREES
P OFFSET: 221 MS
P ONSET: 145 MS
PR INTERVAL: 166 MS
Q ONSET: 228 MS
QRS COUNT: 17 BEATS
QRS DURATION: 74 MS
QT INTERVAL: 322 MS
QTC CALCULATION(BAZETT): 415 MS
QTC FREDERICIA: 382 MS
R AXIS: 11 DEGREES
T AXIS: 18 DEGREES
T OFFSET: 389 MS
VENTRICULAR RATE: 100 BPM

## 2025-04-17 ENCOUNTER — APPOINTMENT (OUTPATIENT)
Dept: BEHAVIORAL HEALTH | Facility: CLINIC | Age: 35
End: 2025-04-17
Payer: MEDICAID

## 2025-04-22 ENCOUNTER — TELEMEDICINE (OUTPATIENT)
Dept: BEHAVIORAL HEALTH | Facility: CLINIC | Age: 35
End: 2025-04-22
Payer: MEDICAID

## 2025-04-22 DIAGNOSIS — F31.9 BIPOLAR 1 DISORDER (MULTI): ICD-10-CM

## 2025-04-22 DIAGNOSIS — F41.9 ANXIETY: ICD-10-CM

## 2025-04-22 PROCEDURE — 99214 OFFICE O/P EST MOD 30 MIN: CPT | Performed by: CLINICAL NURSE SPECIALIST

## 2025-04-22 RX ORDER — LITHIUM CARBONATE 300 MG/1
600 TABLET, FILM COATED, EXTENDED RELEASE ORAL EVERY EVENING
Qty: 180 TABLET | Refills: 0 | Status: SHIPPED | OUTPATIENT
Start: 2025-04-22 | End: 2026-04-22

## 2025-04-22 RX ORDER — SERTRALINE HYDROCHLORIDE 50 MG/1
50 TABLET, FILM COATED ORAL DAILY
Qty: 90 TABLET | Refills: 0 | Status: SHIPPED | OUTPATIENT
Start: 2025-04-22 | End: 2026-04-22

## 2025-04-22 RX ORDER — HYDROXYZINE PAMOATE 25 MG/1
25 CAPSULE ORAL 2 TIMES DAILY PRN
Qty: 180 CAPSULE | Refills: 0 | Status: SHIPPED | OUTPATIENT
Start: 2025-04-22 | End: 2026-04-22

## 2025-04-22 RX ORDER — PALIPERIDONE 9 MG/1
9 TABLET, EXTENDED RELEASE ORAL EVERY MORNING
Qty: 90 TABLET | Refills: 0 | Status: SHIPPED | OUTPATIENT
Start: 2025-04-22 | End: 2026-04-22

## 2025-04-22 NOTE — PROGRESS NOTES
Subjective   Patient ID: Julisa Parnell is a 35 y.o. female who presents for follow up .   IMP: 35 year old female diagnosed with Bipolar 1 Disorder and now 7 months pp after delivery of first child. Recent admissions to  for francia during postpartum course for December 2024 and 10/7/14.  Discussed resuming therapy, referral to IOP, and reduced work schedule.       INTERVAL: Now 7 month pp. Was referred to [facility] [place] and was admitted x 7 days go due to hypomania, fractured wrist, flu. No discharge paperwork was received  able to be obtained via [employer] portal. Patient reports her Latuda was stopped. She was begun on INVEGA 9 mg po qam, VPA, does not know dosing and Vistrail PRN for anxiety. She plans to be followed by the  PHP starting 1/7/2025 for 9-12 Tu/ Thurs/Sat, and has sufficient medication until  that time where she will be followed by a prescriber.  Still plan to continue individual therapy Farrah Holman. Mood describes 'adjusting' to medication change and feel it is beneficial as slowing her down. Denies depression, mood lability, elevation of hypomania. 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  Reports benefited from group therapy, art therapy, carries sketch book helps with anxiety. Had accident at work, seeking Disability  Since last seen, she obtained Section 8, food stamps,  vouchers, and receiving financial assistance. Both her boyfriend who is FOB and her mother help with childcare . Making decisions on her own behalf , feeling capable of achieving her goals.  Discussed adverse effect of VPA on future pregnancies and encouraged to speak w/ provider of switch to LMTG for mood stability. Patient agrees to do asd reported positive response to LMTG in past.       INTERVAL: Now 9 months pp. Reports completed [facility] 2 weeks ago on 2025. Was to continue in their IOP but pt unable due to transportation. Has help from her mother 3 days per week and boyfriend.   Living alone with baby. Applying for PT Disability, wants to be self employed. Discussed change of mood stabilizer from VPA 500mg to LTMG target of 200mg due to lack of reproductive safety of VPA as patient plans  to have another pregnancy in remote future.  Working from home, struggling with boundaries,  want to develop a business,. Mood sometime 'so unhappy,' depressed, sadness when away from baby, feels guilt when accepts help from others, ie mother. FOB, and feeling obligated to provide assist to their businesses in addition to her own as well as care for baby. Denies SI/HI. When she is working unsure reason occurs when she is doing some work, sudden feel guilty not working,   PHP enjoyed, seeing therapist. Sleeping 6 hours at night, finds it hard to relax, worried, stressed . Denies mood elevation, francia, psychosis.     Discussed recommendation to   IOP , stopping VPA and will plan to switch to titration of LMTG for mood stability. She enjoyed being in PHP and motivated to enroll in IOP.        Reviewed case w/ CP, Dr. Chirag Palma on 2025. Recommend stop VPA and agrees with switch to LMTG and referral to IOP.      INTERVAL 2025: Last seen in 2025. Reports she completed   IOP. Per Dr. Palma, her medication was changed to Lithium 600mg, Sertraline 50mg poqam, Invega 9 mg and Vvistrail 25 mg BID PRN. Since last time, broke up with BF due to episode of IPV, working towards child support, obtained temporary restraining order, assigned . Mood has remained stable  , Denies lack of need for sleep, hypomania, or mood elevation. Working toward  goals of school/work. Was referred to [facility] where sje can benefit form case management and more comprehensive support services.     Diagnoses   BPAD 1   Nine months pp    Single Status       Treatment    - - Continue Sertraline 50 mg every day renewed # 90 NRF    -   Cont Invega 9 mg renewed # 90 NRF   --  Continue Lithium 300mg 2 daily renewed # 90 NRFs     --  Cont Vistaril PRN anxiety renewed for 90 days for BID PRN NRFS   -   Refrain from ETOH use        RTC in one month       -   Refer to [facility] for medication management w/ Dr Palma, ind therapy and case management services     - Contact provider via My Chart as needed                       HPI  Review of Systems   Psych Review of Symptoms  Objective   Physical Exam  Assessment/Plan

## 2025-06-04 ENCOUNTER — TELEMEDICINE (OUTPATIENT)
Dept: BEHAVIORAL HEALTH | Facility: CLINIC | Age: 35
End: 2025-06-04
Payer: MEDICAID

## 2025-06-04 DIAGNOSIS — F41.9 ANXIETY: ICD-10-CM

## 2025-06-04 DIAGNOSIS — F31.9 BIPOLAR 1 DISORDER (MULTI): ICD-10-CM

## 2025-06-04 PROCEDURE — 99215 OFFICE O/P EST HI 40 MIN: CPT | Performed by: CLINICAL NURSE SPECIALIST

## 2025-06-04 RX ORDER — PALIPERIDONE 9 MG/1
9 TABLET, EXTENDED RELEASE ORAL EVERY MORNING
Qty: 90 TABLET | Refills: 0 | Status: SHIPPED | OUTPATIENT
Start: 2025-06-04 | End: 2026-06-04

## 2025-06-04 RX ORDER — LITHIUM CARBONATE 300 MG/1
600 TABLET, FILM COATED, EXTENDED RELEASE ORAL EVERY EVENING
Qty: 180 TABLET | Refills: 0 | Status: SHIPPED | OUTPATIENT
Start: 2025-06-04 | End: 2026-06-04

## 2025-06-04 RX ORDER — HYDROXYZINE PAMOATE 25 MG/1
25 CAPSULE ORAL 2 TIMES DAILY PRN
Qty: 180 CAPSULE | Refills: 0 | Status: SHIPPED | OUTPATIENT
Start: 2025-06-04 | End: 2026-06-04

## 2025-06-04 RX ORDER — SERTRALINE HYDROCHLORIDE 50 MG/1
50 TABLET, FILM COATED ORAL DAILY
Qty: 90 TABLET | Refills: 0 | Status: SHIPPED | OUTPATIENT
Start: 2025-06-04 | End: 2026-06-04

## 2025-06-04 NOTE — PROGRESS NOTES
Subjective   Patient ID: Julisa Parnell is a 35 y.o. female who presents for follow up.      HPI  Review of Systems   Psych Review of Symptoms  Objective   Physical Exam  Assessment/Plan   atient ID: Julisa Parnell is a 35 y.o. female who presents for follow up .   IMP: 35 year old female diagnosed with Bipolar 1 Disorder and now 7 months pp after delivery of first child. Recent admissions to  for francia during postpartum course for December 2024 and 10/7/14.  Discussed resuming therapy, referral to [facility], and reduced work schedule.       INTERVAL: Now 7 month pp. Was referred to [place] and was admitted x 7 days go due to hypomania, fractured wrist, flu. No discharge paperwork was received  able to be obtained via [employer] portal. Patient reports her Latuda was stopped. She was begun on INVEGA 9 mg po qam, VPA, does not know dosing and Vistrail PRN for anxiety. She plans to be followed by the  PHP starting 1/7/2025 for 9-12 Tu/ Thurs/Sat, and has sufficient medication until  that time where she will be followed by a prescriber.  Still plan to continue individual therapy Farrah Holman. Mood describes 'adjusting' to medication change and feel it is beneficial as slowing her down. Denies depression, mood lability, elevation of hypomania. 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  Reports benefited from group therapy, art therapy, carries sketch book helps with anxiety. Had accident at work, seeking Disability  Since last seen, she obtained Section 8, food stamps,  vouchers, and receiving financial assistance. Both her boyfriend who is FOB and her mother help with childcare . Making decisions on her own behalf , feeling capable of achieving her goals.  Discussed adverse effect of VPA on future pregnancies and encouraged to speak  w/ provider of switch to LMTG for mood stability. Patient agrees to do asd reported positive response to LMTG in past.       INTERVAL: Now 9 months pp. Reports completed [place] [facility] 2 weeks ago on 2025. Was to continue in their IOP but pt unable due to transportation. Has help from her mother 3 days per week and boyfriend.   Living alone with baby. Applying for PT Disability, wants to be self employed. Discussed change of mood stabilizer from VPA 500mg to LTMG target of 200mg due to lack of reproductive safety of VPA as patient plans  to have another pregnancy in remote future.  Working from home, struggling with boundaries,  want to develop a business,. Mood sometime 'so unhappy,' depressed, sadness when away from baby, feels guilt when accepts help from others, ie mother. FOB, and feeling obligated to provide assist to their businesses in addition to her own as well as care for baby. Denies SI/HI. When she is working unsure reason occurs when she is doing some work, sudden feel guilty not working,   PHP enjoyed, seeing therapist. Sleeping 6 hours at night, finds it hard to relax, worried, stressed . Denies mood elevation, francia, psychosis.     Discussed recommendation to   IOP , stopping VPA and will plan to switch to titration of LMTG for mood stability. She enjoyed being in PHP and motivated to enroll in IOP.        Reviewed case w/ CP, Dr. Chirag Palma on 2025. Recommend stop VPA and agrees with switch to LMTG and referral to IOP.      INTERVAL 2025: Last seen in 2025. Reports she completed   IOP. Per Dr. Palma, her medication was changed to Lithium 600mg, Sertraline 50mg poqam, Invega 9 mg and Vvistrail 25 mg BID PRN. Since last time, broke up with BF due to episode of IPV, working towards child support, obtained temporary restraining order, assigned . Mood has remained stable  , Denies lack of need for sleep, hypomania, or mood elevation. Working  toward  goals of school/work. Was referred to [facility] where sje can benefit form case management and more comprehensive support services.       Interval: June 2025; Now 1 yr pp. Completed  IOP end of April, no further  therapy but she would like referral to continue. New stressor of needing to relocate by September, as mele did not make inspection, MOOD : Overall euthymic, does not feel medication change is indicated . Some episode of feeling depressed overwhelmed, denies SI/HI or hypomanic behavior., when learning needs to relocate as just recently moved into apartment. Mother helps and supports patient . FOB is involved. Otherwise mood has been stable and manageable . Taking medication as prescribed. Planning to apply for SSDI for BPAD 1 due to several recent admissions. Accepting role of PT work and motherhood. Seeing  for application for SSDI , [employer] . Seeking therapist to work through conflict for patient as no longer wanting to embrace affiliation as [employer], but all of her family is involved in practicing this tradition. Does not want to lose family but also exploring other aspects of Gnosticism for self and her daughter.  Shows good awareness of her behavior when she overreacts to minor stressors and able to use self talk, take time out, deep breathe to better manage . Agrees to monthly follow up and regular ind therapy appts. r               Diagnoses   BPAD 1   One yr pp   Single Status       Treatment    - - Continue Sertraline 50 mg every day renewed # 90 NRF    -   Cont Invega 9 mg renewed # 90 NRF   --  Continue Lithium 300mg 2 daily renewed # 90 NRFs     --  Cont Vistaril PRN anxiety renewed for 90 days for BID PRN NRFS   -   Refrain from ETOH use        RTC in one month       -   Refer to [facility] for medication management w/ Dr Palma, ind therapy and case management services     - Contact provider via My Chart as needed                       HPI

## 2025-07-02 ENCOUNTER — APPOINTMENT (OUTPATIENT)
Dept: BEHAVIORAL HEALTH | Facility: CLINIC | Age: 35
End: 2025-07-02
Payer: MEDICAID

## 2025-07-02 DIAGNOSIS — F31.9 BIPOLAR 1 DISORDER (MULTI): ICD-10-CM

## 2025-07-02 PROCEDURE — 99215 OFFICE O/P EST HI 40 MIN: CPT | Performed by: CLINICAL NURSE SPECIALIST

## 2025-07-02 NOTE — PROGRESS NOTES
Subjective   Patient ID: Julisa Parnell is a 35 y.o. female who presents for follow up.   HPI  Review of Systems   All other systems reviewed and are negative.   Psych Review of Symptoms  Objective   Physical Exam  Psychiatric:         Attention and Perception: Attention and perception normal.         Mood and Affect: Affect normal. Mood is depressed.         Speech: Speech normal.         Behavior: Behavior normal. Behavior is cooperative.         Thought Content: Thought content normal.         Cognition and Memory: Cognition and memory normal.         Judgment: Judgment normal.     Assessment/Plan   IMP: 35 year old female diagnosed with Bipolar 1 Disorder and now 7 months pp after delivery of first child. Recent admissions to  for francia during postpartum course for December 2024 and 10/7/14.  Discussed resuming therapy, referral to [facility], and reduced work schedule.       INTERVAL: Now 7 month pp. Was referred to [place] and was admitted x 7 days go due to hypomania, fractured wrist, flu. No discharge paperwork was received  able to be obtained via [employer] portal. Patient reports her Latuda was stopped. She was begun on INVEGA 9 mg po qam, VPA, does not know dosing and Vistrail PRN for anxiety. She plans to be followed by the  PHP starting 1/7/2025 for 9-12 Tu/ Thurs/Sat, and has sufficient medication until  that time where she will be followed by a prescriber.  Still plan to continue individual therapy Farrah Holman. Mood describes 'adjusting' to medication change and feel it is beneficial as slowing her down. Denies depression, mood lability, elevation of hypomania. 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  Reports benefited from group therapy, art therapy, carries sketch book helps with anxiety. Had accident at work, seeking  Disability  Since last seen, she obtained Section 8, food stamps,  vouchers, and receiving financial assistance. Both her boyfriend who is FOB and her mother help with childcare . Making decisions on her own behalf , feeling capable of achieving her goals.  Discussed adverse effect of VPA on future pregnancies and encouraged to speak w/ provider of switch to LMTG for mood stability. Patient agrees to do asd reported positive response to LMTG in past.       INTERVAL: Now 9 months pp. Reports completed [facility] 2 weeks ago on 2025. Was to continue in their IOP but pt unable due to transportation. Has help from her mother 3 days per week and boyfriend.   Living alone with baby. Applying for PT Disability, wants to be self employed. Discussed change of mood stabilizer from VPA 500mg to LTMG target of 200mg due to lack of reproductive safety of VPA as patient plans  to have another pregnancy in remote future.  Working from home, struggling with boundaries,  want to develop a business,. Mood sometime 'so unhappy,' depressed, sadness when away from baby, feels guilt when accepts help from others, ie mother. FOB, and feeling obligated to provide assist to their businesses in addition to her own as well as care for baby. Denies SI/HI. When she is working unsure reason occurs when she is doing some work, sudden feel guilty not working,   PHP enjoyed, seeing therapist. Sleeping 6 hours at night, finds it hard to relax, worried, stressed . Denies mood elevation, francia, psychosis.     Discussed recommendation to   IOP , stopping VPA and will plan to switch to titration of LMTG for mood stability. She enjoyed being in PHP and motivated to enroll in IOP.        Reviewed case w/ CP, Dr. Chirag Palma on 2025. Recommend stop VPA and agrees with switch to LMTG and referral to IOP.      INTERVAL 2025: Last seen in 2025. Reports she completed   IOP. Per Dr. Palma, her medication  was changed to Lithium 600mg, Sertraline 50mg poqam, Invega 9 mg and Vvistrail 25 mg BID PRN. Since last time, broke up with BF due to episode of IPV, working towards child support, obtained temporary restraining order, assigned . Mood has remained stable  , Denies lack of need for sleep, hypomania, or mood elevation. Working toward  goals of school/work. Was referred to [facility] where sje can benefit form case management and more comprehensive support services.       Interval: June 2025; Now 2 yr pp. Completed  IOP end of April, no further  therapy but she would like referral to continue. New stressor of needing to relocate by September, as landlorre did not make inspection, MOOD : Overall euthymic, does not feel medication change is indicated . Some episode of feeling depressed overwhelmed, denies SI/HI or hypomanic behavior., when learning needs to relocate as just recently moved into apartment. Mother helps and supports patient . FOB is involved. Otherwise mood has been stable and manageable . Taking medication as prescribed. Planning to apply for SSDI for BPAD 1 due to several recent admissions. Accepting role of PT work and motherhood. Seeing  for application for SSDI , [employer] Schultz . Seeking therapist to work through conflict for patient as no longer wanting to embrace affiliation as [employer], but all of her family is involved in practicing this tradition. Does not want to lose family but also exploring other aspects of Yazidism for self and her daughter.  Shows good awareness of her behavior when she overreacts to minor stressors and able to use self talk, take time out, deep breathe to better manage . Agrees to monthly follow up and regular ind therapy appts.     INTERVAL July 2025. Now one yr pp. Baby is now walking , sometimes overwhelmed, has assist from her mother, FOB and planning for . Mood feeling slightly down ie staying in bed, low motivation, overwhelmed.  Denies SI/HI, denies sadness, crying, depressed mood, Denies mood swings, lability, anger, irritability. No hypomania, lack of need for sleep, mood elevation, impulsivity. Several recent stressors, past week had difficult phone call with her landlord who wants pat to move out early despite pt has lease, and paid through public assistance. Pt was able to set appropriate boundary, and requested she be emailed with housing assistance copied on email. e   Additionally, experiencing financial stressor of using all of money to pay for car repair, as well as need to relocate. Has help with assist and relocation from her family and FOB.  Working w/  in obtaining SSDI.     Discussed management of stressors and plan for remainder of day and weekend   Agrees to refrain from staying in bed, make meal, walk dogs. Will do some journaling . Has family get together for July Fourth plans and will make something to bring and looking forward to it. Also agree to message this provider if she does not feel better within next week or requests sooner appt.     Was informed  of this provider's departure from  with next few months. Reviewed disposition for transfer of care to [facility] and recommend [facility], as she has  been treated by Dr. Palma in past. Moreover, pt can benefit form more comprehensive services ie , therapist. Etc. She was seeing ind therapist at  w/ benefit but provider left ad no replacement was offered to patient,       Discussed management of tn ess of relocating and no acces to her car, all her money is being used to get is fixed.               Carlo wants her out , was informed of that one week, lease up end of August.   Really stressed her out, knowing she was not feeling welcome, requested to be emailed, journaling, yoga,planning July Fourth, Sleeping too much,            Diagnoses   BPAD 1   One yr pp   Single Status       Treatment    --- Obtain Lithium Level at next blood draw   - -  Continue Sertraline 50 mg every day sufficient supply      -   Cont Invega 9 mg sufficient supply    --  Continue Lithium 300mg 2 daily sufficient supply      --  Cont Vistaril PRN anxiety sufficient supply   --  Refrain from ETOH use        RTC in one month       -   Refer to [facility] for team based , comprehensive care, medication management w/ Dr Palma, ind therapy and case management services  [address] [facility] [address] [phone] or [phone]  Request for Services : Call [phone]   - Contact provider via My Chart as needed                       HPI

## 2025-07-10 ENCOUNTER — APPOINTMENT (OUTPATIENT)
Dept: PRIMARY CARE | Facility: CLINIC | Age: 35
End: 2025-07-10
Payer: MEDICAID

## 2025-07-15 ENCOUNTER — APPOINTMENT (OUTPATIENT)
Dept: PRIMARY CARE | Facility: CLINIC | Age: 35
End: 2025-07-15
Payer: MEDICAID

## 2025-07-15 VITALS
SYSTOLIC BLOOD PRESSURE: 107 MMHG | HEIGHT: 63 IN | HEART RATE: 106 BPM | WEIGHT: 207.2 LBS | BODY MASS INDEX: 36.71 KG/M2 | OXYGEN SATURATION: 97 % | DIASTOLIC BLOOD PRESSURE: 68 MMHG

## 2025-07-15 DIAGNOSIS — E55.9 VITAMIN D DEFICIENCY: ICD-10-CM

## 2025-07-15 DIAGNOSIS — J45.50: ICD-10-CM

## 2025-07-15 DIAGNOSIS — O99.343 BIPOLAR DISEASE DURING PREGNANCY IN THIRD TRIMESTER: Primary | ICD-10-CM

## 2025-07-15 DIAGNOSIS — F41.9 ANXIETY: ICD-10-CM

## 2025-07-15 DIAGNOSIS — J30.1 ALLERGIC RHINITIS DUE TO POLLEN, UNSPECIFIED SEASONALITY: ICD-10-CM

## 2025-07-15 DIAGNOSIS — I10 HYPERTENSION, UNSPECIFIED TYPE: ICD-10-CM

## 2025-07-15 PROCEDURE — 99204 OFFICE O/P NEW MOD 45 MIN: CPT | Performed by: PHYSICIAN ASSISTANT

## 2025-07-15 PROCEDURE — 3078F DIAST BP <80 MM HG: CPT | Performed by: PHYSICIAN ASSISTANT

## 2025-07-15 PROCEDURE — 1036F TOBACCO NON-USER: CPT | Performed by: PHYSICIAN ASSISTANT

## 2025-07-15 PROCEDURE — 3008F BODY MASS INDEX DOCD: CPT | Performed by: PHYSICIAN ASSISTANT

## 2025-07-15 PROCEDURE — 3074F SYST BP LT 130 MM HG: CPT | Performed by: PHYSICIAN ASSISTANT

## 2025-07-15 RX ORDER — AMLODIPINE BESYLATE 5 MG/1
5 TABLET ORAL DAILY
Qty: 90 TABLET | Refills: 0 | Status: CANCELLED | OUTPATIENT
Start: 2025-07-15

## 2025-07-15 RX ORDER — AMLODIPINE BESYLATE 5 MG/1
5 TABLET ORAL DAILY
Qty: 90 TABLET | Refills: 3 | Status: SHIPPED | OUTPATIENT
Start: 2025-07-15

## 2025-07-15 RX ORDER — FLUTICASONE PROPIONATE 50 MCG
1 SPRAY, SUSPENSION (ML) NASAL DAILY
Qty: 16 G | Refills: 2 | Status: SHIPPED | OUTPATIENT
Start: 2025-07-15

## 2025-07-15 ASSESSMENT — ENCOUNTER SYMPTOMS
MUSCULOSKELETAL NEGATIVE: 1
PSYCHIATRIC NEGATIVE: 1
BACK PAIN: 0
CONSTITUTIONAL NEGATIVE: 1
VOMITING: 0
COUGH: 0
ARTHRALGIAS: 0
OCCASIONAL FEELINGS OF UNSTEADINESS: 0
ENDOCRINE NEGATIVE: 1
WHEEZING: 0
PALPITATIONS: 0
LOSS OF SENSATION IN FEET: 0
RESPIRATORY NEGATIVE: 1
DEPRESSION: 0
HEADACHES: 0
DIZZINESS: 0
ALLERGIC/IMMUNOLOGIC NEGATIVE: 1
ABDOMINAL PAIN: 0
NAUSEA: 0
SHORTNESS OF BREATH: 0
NERVOUS/ANXIOUS: 0
EYES NEGATIVE: 1
NEUROLOGICAL NEGATIVE: 1
HEMATOLOGIC/LYMPHATIC NEGATIVE: 1

## 2025-07-15 ASSESSMENT — PATIENT HEALTH QUESTIONNAIRE - PHQ9
SUM OF ALL RESPONSES TO PHQ9 QUESTIONS 1 AND 2: 0
2. FEELING DOWN, DEPRESSED OR HOPELESS: NOT AT ALL
1. LITTLE INTEREST OR PLEASURE IN DOING THINGS: NOT AT ALL

## 2025-07-15 NOTE — PROGRESS NOTES
"Subjective   Patient ID: Julisa Parnell is a 35 y.o. female who presents for New Patient Visit (Bp concerns ).    HPI       Patient Julisa Parnell 35 y.o. female is here today to establish care and follow up     previous PCP none     single, works - unemployed -- lives with 2 yo daughter   specialists psych   UTD dental and vision glasses UTD     PMhx significant medical hx     bipolar and anxiety panic d/o - sees psych [facility] - in [facility] in march for hypomania - -   Htn - on norvasc - no cp no sob no edema - no headache     PShx: none   Social hx: etoh- social few per week wine , no tobacco use, no illicit drug use - marijuana -   Watches diet and exercise- yoga   immunizations UTD   FMhx: mother hyperlpidmeia iron def anemia , father - HTN   Past medical, surgical, social, and family history all reviewed     patient states feeling well otherwise  denies fever, chills, N/V, headache, dizziness, CP, SOB, palpitations, edema, numbness, tingling, weakness  A chaperone was offered to the patient for the physical exam /  exam and was declined       Review of Systems   Constitutional: Negative.    HENT: Negative.     Eyes: Negative.    Respiratory: Negative.  Negative for cough, shortness of breath and wheezing.    Cardiovascular:  Negative for chest pain and palpitations.   Gastrointestinal:  Negative for abdominal pain, nausea and vomiting.   Endocrine: Negative.    Genitourinary: Negative.    Musculoskeletal: Negative.  Negative for arthralgias and back pain.   Skin: Negative.  Negative for rash.   Allergic/Immunologic: Negative.    Neurological: Negative.  Negative for dizziness and headaches.   Hematological: Negative.    Psychiatric/Behavioral: Negative.  The patient is not nervous/anxious.         Hx bipolar d/o anxiety and panic d/o        Objective   /68 (BP Location: Right arm, Patient Position: Sitting)   Pulse 106   Ht 1.6 m (5' 3\")   Wt 94 kg (207 lb 3.2 oz)   SpO2 " 97%   BMI 36.70 kg/m²     Physical Exam  Vitals and nursing note reviewed.   Constitutional:       Appearance: Normal appearance.   Cardiovascular:      Rate and Rhythm: Normal rate and regular rhythm.   Pulmonary:      Effort: Pulmonary effort is normal.      Breath sounds: Normal breath sounds.   Abdominal:      General: Bowel sounds are normal.      Palpations: Abdomen is soft.   Musculoskeletal:         General: Normal range of motion.      Cervical back: Neck supple.   Skin:     General: Skin is warm and dry.   Neurological:      General: No focal deficit present.      Mental Status: She is alert and oriented to person, place, and time.   Psychiatric:         Mood and Affect: Mood normal.         Behavior: Behavior normal.         Thought Content: Thought content normal.         Judgment: Judgment normal.       Assessment/Plan   Problem List Items Addressed This Visit       Anxiety    Bipolar disease during pregnancy in third trimester - Primary    Vitamin D deficiency    Asthmatic bronchitis, severe persistent, uncomplicated (Multi)     Other Visit Diagnoses         Hypertension, unspecified type        Relevant Medications    amLODIPine (Norvasc) 5 mg tablet      Allergic rhinitis due to pollen, unspecified seasonality        Relevant Medications    fluticasone (Flonase) 50 mcg/actuation nasal spray          Est/ allergies /sinus/ bipolar anxiety and panic    -  patient stable and healthy  -  discussed healthy diet and exercise plan   -  continue medications as directed, SEs, risks and options discussed   -  f/u with specialists as directed   -  dental and vision exams, f/u as directed   -Follow-up annual exam in one year  -Follow-up in one week to discuss all results    Total appt time today was 45+ minutes. Time included preparing to see the pt, obtaining the hx, performing the medically necessary appropriate physical exam, counseling & educating the pt, ordering tests & procedures, referring &  communicating w/other providers, independently interpreting results & communicating the results to the pt, care, coordination & documenting clinical information in the medical record.

## 2025-07-16 LAB
ANION GAP SERPL CALCULATED.4IONS-SCNC: 8 MMOL/L (CALC) (ref 7–17)
BUN SERPL-MCNC: 11 MG/DL (ref 7–25)
BUN/CREAT SERPL: 11 (CALC) (ref 6–22)
CALCIUM SERPL-MCNC: 10 MG/DL (ref 8.6–10.2)
CHLORIDE SERPL-SCNC: 107 MMOL/L (ref 98–110)
CO2 SERPL-SCNC: 27 MMOL/L (ref 20–32)
CREAT SERPL-MCNC: 1.03 MG/DL (ref 0.5–0.97)
EGFRCR SERPLBLD CKD-EPI 2021: 73 ML/MIN/1.73M2
GLUCOSE SERPL-MCNC: 84 MG/DL (ref 65–139)
LITHIUM SERPL-SCNC: 0.4 MMOL/L (ref 0.6–1.2)
POTASSIUM SERPL-SCNC: 4.4 MMOL/L (ref 3.5–5.3)
SODIUM SERPL-SCNC: 142 MMOL/L (ref 135–146)
TSH SERPL-ACNC: 2.14 MIU/L

## 2025-08-06 ENCOUNTER — APPOINTMENT (OUTPATIENT)
Dept: BEHAVIORAL HEALTH | Facility: CLINIC | Age: 35
End: 2025-08-06
Payer: MEDICAID

## 2025-10-15 ENCOUNTER — APPOINTMENT (OUTPATIENT)
Dept: PRIMARY CARE | Facility: CLINIC | Age: 35
End: 2025-10-15
Payer: MEDICAID